# Patient Record
Sex: FEMALE | Race: WHITE | Employment: OTHER | RURAL
[De-identification: names, ages, dates, MRNs, and addresses within clinical notes are randomized per-mention and may not be internally consistent; named-entity substitution may affect disease eponyms.]

---

## 2017-01-06 ENCOUNTER — OFFICE VISIT (OUTPATIENT)
Dept: INTERNAL MEDICINE CLINIC | Age: 77
End: 2017-01-06

## 2017-01-06 VITALS
OXYGEN SATURATION: 97 % | HEART RATE: 58 BPM | HEIGHT: 63 IN | WEIGHT: 227 LBS | TEMPERATURE: 96.2 F | DIASTOLIC BLOOD PRESSURE: 70 MMHG | SYSTOLIC BLOOD PRESSURE: 170 MMHG | RESPIRATION RATE: 20 BRPM | BODY MASS INDEX: 40.22 KG/M2

## 2017-01-06 DIAGNOSIS — N39.0 URINARY TRACT INFECTION, SITE UNSPECIFIED: Primary | ICD-10-CM

## 2017-01-06 DIAGNOSIS — R30.9 PAIN WITH URINATION: ICD-10-CM

## 2017-01-06 LAB
BILIRUB UR QL STRIP: NEGATIVE
GLUCOSE UR-MCNC: NEGATIVE MG/DL
KETONES P FAST UR STRIP-MCNC: NEGATIVE MG/DL
PH UR STRIP: 6 [PH] (ref 4.6–8)
PROT UR QL STRIP: NEGATIVE MG/DL
SP GR UR STRIP: 1.01 (ref 1–1.03)
UA UROBILINOGEN AMB POC: NORMAL (ref 0.2–1)
URINALYSIS CLARITY POC: NORMAL
URINALYSIS COLOR POC: YELLOW
URINE BLOOD POC: NORMAL
URINE LEUKOCYTES POC: NORMAL
URINE NITRITES POC: NEGATIVE

## 2017-01-06 RX ORDER — NITROFURANTOIN 25; 75 MG/1; MG/1
100 CAPSULE ORAL 2 TIMES DAILY
Qty: 14 CAP | Refills: 0 | Status: SHIPPED | OUTPATIENT
Start: 2017-01-06 | End: 2017-01-13

## 2017-01-06 RX ORDER — PHENAZOPYRIDINE HYDROCHLORIDE 100 MG/1
100 TABLET, FILM COATED ORAL
Qty: 9 TAB | Refills: 0 | Status: SHIPPED | OUTPATIENT
Start: 2017-01-06 | End: 2017-01-09

## 2017-01-06 NOTE — PROGRESS NOTES
HISTORY OF PRESENT ILLNESS  Feliciano Schwab is a 78 y.o. female. Dysuria   The history is provided by the patient. This is a new problem. Episode onset: 3 days. The problem occurs hourly. The problem has been gradually worsening. Pertinent negatives include no abdominal pain. Treatments tried: cranberry. The treatment provided mild relief. Urinary Frequency    Associated symptoms include frequency. Pertinent negatives include no abdominal pain. Started Contrave x few weeks but not lost much weight yet. Review of Systems   Gastrointestinal: Negative for abdominal pain. Genitourinary: Positive for difficulty urinating and frequency. Physical Exam  Visit Vitals    /70 (BP 1 Location: Left arm, BP Patient Position: Sitting)    Pulse (!) 58    Temp 96.2 °F (35.7 °C) (Oral)    Resp 20    Ht 5' 3\" (1.6 m)    Wt 227 lb (103 kg)    SpO2 97%    BMI 40.21 kg/m2     WD WN female NAD  Heart RRR without murmers clicks or rubs  Lungs CTA  Abdo soft nontender  Ext no edema    ASSESSMENT and PLAN  Encounter Diagnoses   Name Primary?  Urinary tract infection, site unspecified Yes    Pain with urination      Orders Placed This Encounter    AMB POC URINALYSIS DIP STICK AUTO W/O MICRO    nitrofurantoin, macrocrystal-monohydrate, (MACROBID) 100 mg capsule    phenazopyridine (PYRIDIUM) 100 mg tablet     Follow-up Disposition:  Return if symptoms worsen or fail to improve.

## 2017-01-06 NOTE — MR AVS SNAPSHOT
Visit Information Date & Time Provider Department Dept. Phone Encounter #  
 1/6/2017  2:00 PM MD Negro Salinas 380 [phone] [id number] Follow-up Instructions Return if symptoms worsen or fail to improve. Your Appointments 1/6/2017  2:00 PM  
ACUTE CARE with Claudine Ayoub MD  
[facility]) Appt Note: uti $0cp 1/6/17 [facility]  
 [address] [place] [address]  
[phone]  
  
   
 [address] [facility]  
  
    
 1/25/2017  8:15 AM  
ROUTINE CARE with MD Negro Salinas 380 ([facility]) Appt Note: 3 month f/u $0 cp [facility] 10/25/16  
 [address] [place] [address]  
[phone]  
  
   
 [address] [place] [address] [address] Upcoming Health Maintenance Date Due DTaP/Tdap/Td series (1 - Tdap) 12/18/1963 GLAUCOMA SCREENING Q2Y 12/18/2007 COLONOSCOPY 7/27/2015 MEDICARE YEARLY EXAM 9/14/2017 BREAST CANCER SCRN MAMMOGRAM 10/6/2018 Allergies as of 1/6/2017  Review Complete On: 1/6/2017 By: Claudine Ayoub MD  
  
 Severity Noted Reaction Type Reactions Lisinopril  06/08/2015    Cough Current Immunizations  Reviewed on 9/13/2016 Name Date Influenza Vaccine Justice Me) 10/23/2014 Influenza Vaccine (Quad) PF 9/13/2016, 11/13/2015 Pneumococcal Conjugate (PCV-13) 12/31/2015 Pneumococcal Polysaccharide (PPSV-23) 9/13/2016 Not reviewed this visit You Were Diagnosed With   
  
 Codes Comments Urinary tract infection, site unspecified    -  Primary ICD-10-CM: N39.0 Pain with urination     ICD-10-CM: R30.9 ICD-9-CM: 329. 1 Vitals BP Pulse Temp Resp Height(growth percentile) Weight(growth percentile) 170/70 (BP 1 Location: Left arm, BP Patient Position: Sitting) (!) 58 96.2 °F (35.7 °C) (Oral) 20 5' 3\" (1.6 m) 227 lb (103 kg) SpO2 BMI OB Status Smoking Status 97% 40.21 kg/m2 Ablation Never Smoker BMI and BSA Data Body Mass Index Body Surface Area  
 40.21 kg/m 2 2.14 m 2 Preferred Pharmacy Pharmacy Name Phone [facility] [address] [facility], [address] [facility] [phone] Your Updated Medication List  
  
   
This list is accurate as of: 1/6/17 12:03 PM.  Always use your most recent med list. amLODIPine 2.5 mg tablet Commonly known as:  Voncile Overbrook Take 1 Tab by mouth daily. aspirin 81 mg chewable tablet Take 1 Tab by mouth daily. COQ10  100-100 mg-unit Cap Generic drug:  coenzyme q10-vitamin e Take  by mouth.  
  
 levothyroxine 112 mcg tablet Commonly known as:  SYNTHROID Take 1 Tab by mouth Daily (before breakfast). meloxicam 15 mg tablet Commonly known as:  MOBIC Take 1 Tab by mouth daily. multivitamin tablet Commonly known as:  ONE A DAY Take 1 Tab by mouth daily. naltrexone-buPROPion 8-90 mg Tber ER tablet Commonly known as:  Yeyo Braun Week 1 1 tab PO QAM, Week 2 1QAM 1QHS, Week 3 2QAM 1 QHS, Week 4 & beyond 2QAM 2QHS  
  
 nitrofurantoin (macrocrystal-monohydrate) 100 mg capsule Commonly known as:  MACROBID Take 1 Cap by mouth two (2) times a day for 7 days. Omega-3-DHA-EPA-Fish Oil 1,200 (144-216) mg Cap Take 90 Caps by mouth two (2) times a day. phenazopyridine 100 mg tablet Commonly known as:  PYRIDIUM Take 1 Tab by mouth three (3) times daily as needed for Pain for up to 3 days. Prescriptions Sent to Pharmacy Refills  
 nitrofurantoin, macrocrystal-monohydrate, (MACROBID) 100 mg capsule 0 Sig: Take 1 Cap by mouth two (2) times a day for 7 days. Class: Normal  
 Pharmacy: [id number] [facility], [address] [facility] Ph #: [phone]  Route: Oral  
 phenazopyridine (PYRIDIUM) 100 mg tablet 0  
 Sig: Take 1 Tab by mouth three (3) times daily as needed for Pain for up to 3 days. Class: Normal  
 Pharmacy: [id number], [address] Ph #: [phone] Route: Oral  
  
We Performed the Following AMB POC URINALYSIS DIP STICK AUTO W/O MICRO [48207 CPT(R)] Follow-up Instructions Return if symptoms worsen or fail to improve. Introducing [facility] & HEALTH SERVICES! Oscar Warren introduces [employer] patient portal. Now you can access parts of your medical record, email your doctor's office, and request medication refills online. 1. In your internet browser, go to [email or web address] 2. Click on the First Time User? Click Here link in the Sign In box. You will see the New Member Sign Up page. 3. Enter your [employer] Access Code exactly as it appears below. You will not need to use this code after youve completed the sign-up process. If you do not sign up before the expiration date, you must request a new code. · [employer] Access Code: [email or web address] Expires: 4/6/2017 12:03 PM 
 
4. Enter the last four digits of your Social Security Number (xxxx) and Date of Birth (mm/dd/yyyy) as indicated and click Submit. You will be taken to the next sign-up page. 5. Create a [employer] ID. This will be your [employer] login ID and cannot be changed, so think of one that is secure and easy to remember. 6. Create a [employer] password. You can change your password at any time. 7. Enter your Password Reset Question and Answer. This can be used at a later time if you forget your password. 8. Enter your e-mail address. You will receive e-mail notification when new information is available in [address] 9. Click Sign Up. You can now view and download portions of your medical record. 10. Click the Download Summary menu link to download a portable copy of your medical information.  
 
If you have questions, please visit the Frequently Asked Questions section of the [employer]. Remember, [employer] is NOT to be used for urgent needs. For medical emergencies, dial 911. Now available from your iPhone and Android! Please provide this summary of care documentation to your next provider. Your primary care clinician is listed as Chantell Dalton. If you have any questions after today's visit, please call [phone].

## 2017-02-09 ENCOUNTER — TELEPHONE (OUTPATIENT)
Dept: INTERNAL MEDICINE CLINIC | Age: 77
End: 2017-02-09

## 2017-02-09 DIAGNOSIS — N30.00 ACUTE CYSTITIS WITHOUT HEMATURIA: Primary | ICD-10-CM

## 2017-02-09 RX ORDER — CIPROFLOXACIN 500 MG/1
500 TABLET ORAL 2 TIMES DAILY
Qty: 14 TAB | Refills: 0 | Status: SHIPPED | OUTPATIENT
Start: 2017-02-09 | End: 2017-02-16

## 2017-02-09 NOTE — PROGRESS NOTES
Advised nurse to tell patient new ABT sent into pharmacy. Come in to give urine 2-3 days after finishing ABT.

## 2017-02-09 NOTE — TELEPHONE ENCOUNTER
Pt called back, stated that she really needed that antibiotic,called in asap, stated that she needed a call back/mrdication called in/pt was unsure of what type of antibiotic she needed

## 2017-02-09 NOTE — TELEPHONE ENCOUNTER
Spoke with pt stated that she still believes she has an uti stated that she has used all of her antibiotics would like dr Naomi Decker to call her in more antibiotics

## 2017-03-02 ENCOUNTER — OFFICE VISIT (OUTPATIENT)
Dept: INTERNAL MEDICINE CLINIC | Age: 77
End: 2017-03-02

## 2017-03-02 VITALS
BODY MASS INDEX: 38.27 KG/M2 | DIASTOLIC BLOOD PRESSURE: 59 MMHG | OXYGEN SATURATION: 95 % | SYSTOLIC BLOOD PRESSURE: 138 MMHG | RESPIRATION RATE: 16 BRPM | HEART RATE: 58 BPM | TEMPERATURE: 96.8 F | WEIGHT: 216 LBS | HEIGHT: 63 IN

## 2017-03-02 DIAGNOSIS — E03.9 HYPOTHYROIDISM, UNSPECIFIED TYPE: ICD-10-CM

## 2017-03-02 DIAGNOSIS — R00.2 HEART PALPITATIONS: ICD-10-CM

## 2017-03-02 DIAGNOSIS — R00.1 BRADYCARDIA ON ECG: ICD-10-CM

## 2017-03-02 DIAGNOSIS — I10 ESSENTIAL HYPERTENSION: Primary | ICD-10-CM

## 2017-03-02 DIAGNOSIS — R53.83 FATIGUE, UNSPECIFIED TYPE: ICD-10-CM

## 2017-03-02 NOTE — PATIENT INSTRUCTIONS
To see if the medications is causing a side affect, consider a drug holiday. Stop the medications for several weeks. If the side affect goes away you it's likely the medication. Can re-start the medication, If symptoms re-occur than it's the medication that is causing the problem. Can stop contrave to see if Sx goes away.

## 2017-03-02 NOTE — MR AVS SNAPSHOT
Visit Information Date & Time Provider Department Dept. Phone Encounter #  
 3/2/2017  8:50 AM Ellie Arriaga  Damon Sparks [id number] Follow-up Instructions Return in about 3 months (around 6/2/2017) for routine follow up. Upcoming Health Maintenance Date Due DTaP/Tdap/Td series (1 - Tdap) 12/18/1963 GLAUCOMA SCREENING Q2Y 12/18/2007 COLONOSCOPY 7/27/2015 MEDICARE YEARLY EXAM 9/14/2017 Allergies as of 3/2/2017  Review Complete On: 3/2/2017 By: Serena Singh LPN Severity Noted Reaction Type Reactions Lisinopril  06/08/2015    Cough Current Immunizations  Reviewed on 9/13/2016 Name Date Influenza Vaccine [facility]) 10/23/2014 Influenza Vaccine (Quad) PF 9/13/2016, 11/13/2015 Pneumococcal Conjugate (PCV-13) 12/31/2015 Pneumococcal Polysaccharide (PPSV-23) 9/13/2016 Not reviewed this visit You Were Diagnosed With   
  
 Codes Comments Essential hypertension    -  Primary ICD-10-CM: I10 
ICD-9-CM: 401.9 Hypothyroidism, unspecified type     ICD-10-CM: E03.9 ICD-9-CM: 244.9 Heart palpitations     ICD-10-CM: R00.2 ICD-9-CM: 785.1 Bradycardia on ECG     ICD-10-CM: R00.1 ICD-9-CM: 427.89 Fatigue, unspecified type     ICD-10-CM: R53.83 ICD-9-CM: 780.79 Vitals BP  
  
  
  
  
  
 138/59 (BP 1 Location: Left arm, BP Patient Position: Sitting) Vitals History BMI and BSA Data Body Mass Index Body Surface Area  
 38.26 kg/m 2 2.09 m 2 Preferred Pharmacy Pharmacy Name Phone [facility] [address] [facility] [address] [facility] [phone] Your Updated Medication List  
  
   
This list is accurate as of: 3/2/17 10:29 AM.  Always use your most recent med list. amLODIPine 2.5 mg tablet Commonly known as:  Cr [place]olla Take 1 Tab by mouth daily. aspirin 81 mg chewable tablet Take 1 Tab by mouth daily. COQ10  100-100 mg-unit Cap Generic drug:  coenzyme q10-vitamin e Take  by mouth.  
  
 levothyroxine 112 mcg tablet Commonly known as:  SYNTHROID Take 1 Tab by mouth Daily (before breakfast). meloxicam 15 mg tablet Commonly known as:  MOBIC Take 1 Tab by mouth daily. multivitamin tablet Commonly known as:  ONE A DAY Take 1 Tab by mouth daily. naltrexone-buPROPion 8-90 mg Tber ER tablet Commonly known as:  [place] Take 2 Tabs by mouth ACB/HS. Omega-3-DHA-EPA-Fish Oil 1,200 (144-216) mg Cap Take 90 Caps by mouth two (2) times a day. Prescriptions Sent to Pharmacy Refills  
 naltrexone-buPROPion (CONTRAVE) 8-90 mg TbER ER tablet 1 Sig: Take 2 Tabs by mouth ACB/HS. Class: Normal  
 Pharmacy: [facility] [address] [facility], [address] [facility] Ph #: [phone] Route: Oral  
  
We Performed the Following AMB POC EKG ROUTINE W/ 12 LEADS, INTER & REP [85483 CPT(R)] CBC W/O DIFF [96401 CPT(R)] METABOLIC PANEL, BASIC [79854 CPT(R)] MN COLLECTION VENOUS BLOOD,VENIPUNCTURE [id number] CPT(R)] MN HANDLG&/OR CONVEY OF SPEC FOR TR OFFICE TO LAB [14483 CPT(R)] REFERRAL TO CARDIOLOGY [QLL86 Custom] TSH 3RD GENERATION [47719 CPT(R)] Follow-up Instructions Return in about 3 months (around 6/2/2017) for routine follow up. Referral Information Referral ID Referred By Referred To  
  
 [id number] Martinez WHEATLEY MD   
   [address] Phone: [phone] Fax: [phone] Visits Status Start Date End Date 1 New Request 3/2/17 3/2/18 If your referral has a status of pending review or denied, additional information will be sent to support the outcome of this decision. Patient Instructions To see if the medications is causing a side affect, consider a drug holiday. Stop the medications for several weeks. If the side affect goes away you it's likely the medication. Can re-start the medication, If symptoms re-occur than it's the medication that is causing the problem. Can stop contrave to see if Sx goes away. Introducing [facility] & HE[facility] SERVICES! [employer] introduces [employer] patient portal. Now you can access parts of your medical record, email your doctor's office, and request medication refills online. 1. In your internet browser, go to [email or web address] 2. Click on the First Time User? Click Here link in the Sign In box. You will see the New Member Sign Up page. 3. Enter your [employer] Access Code exactly as it appears below. You will not need to use this code after youve completed the sign-up process. If you do not sign up before the expiration date, you must request a new code. · [employer] Access Code: [email or web address] Expires: 4/6/2017 12:03 PM 
 
4. Enter the last four digits of your Social Security Number (xxxx) and Date of Birth (mm/dd/yyyy) as indicated and click Submit. You will be taken to the next sign-up page. 5. Create a [employer] ID. This will be your [employer] login ID and cannot be changed, so think of one that is secure and easy to remember. 6. Create a [employer] password. You can change your password at any time. 7. Enter your Password Reset Question and Answer. This can be used at a later time if you forget your password. 8. Enter your e-mail address. You will receive e-mail notification when new information is available in [address] 9. Click Sign Up. You can now view and download portions of your medical record. 10. Click the Download Summary menu link to download a portable copy of your medical information. If you have questions, please visit the Frequently Asked Questions section of the [employer] website. Remember, [employer] is NOT to be used for urgent needs. For medical emergencies, dial 911. Now available from your iPhone and Android! Please provide this summary of care documentation to your next provider. Your primary care clinician is listed as Yazan Mahoney. If you have any questions after today's visit, please call [phone].

## 2017-03-02 NOTE — PROGRESS NOTES
Subjective:     Claudine Dorado is a 78 y.o. female who presents for follow up of hypertension. New concerns: some tremors and pit in stomach anxiety x 1 month ? From contrave. With the weight loss medicine has managed to lose 11 pounds. Had somewhat similar symptoms with the generic version of this medicine. Tired a lot. Some anxiety and feels as if her heart is beating fast.  No exertional chest pain. The medicine is expensive but it has helped. Does not take any medicines to lower her heart rate. She is somewhat bradycardic. She has no known heart disease. Current Outpatient Prescriptions   Medication Sig Dispense Refill    amLODIPine (NORVASC) 2.5 mg tablet Take 1 Tab by mouth daily. 90 Tab 1    meloxicam (MOBIC) 15 mg tablet Take 1 Tab by mouth daily. 90 Tab 1    naltrexone-buPROPion (CONTRAVE) 8-90 mg TbER ER tablet Take 2 Tabs by mouth ACB/HS. Appointment needed to refill this medication again. 120 Tab 1    levothyroxine (SYNTHROID) 112 mcg tablet Take 1 Tab by mouth Daily (before breakfast). 90 Tab 1    aspirin 81 mg chewable tablet Take 1 Tab by mouth daily. 90 Tab 3    multivitamin (ONE A DAY) tablet Take 1 Tab by mouth daily.  coenzyme q10-vitamin e (COQ10 ) 100-100 mg-unit cap Take  by mouth.  Omega-3-DHA-EPA-Fish Oil 1,200 (144-216) mg cap Take 90 Caps by mouth two (2) times a day. 90 Cap 0     Allergies   Allergen Reactions    Lisinopril Cough       Diet and Lifestyle: generally follows a low fat low cholesterol diet, sedentary    Cardiovascular ROS: taking medications as instructed, side effects noted by patient include palpitations, no TIA's, no chest pain on exertion, no dyspnea on exertion, no swelling of ankles. Review of Systems, additional:  Pertinent items are noted in HPI.       Patient Active Problem List    Diagnosis Date Noted    Hypothyroidism 09/10/2014    HTN (hypertension) 09/10/2014    Abnormal heart sounds 09/05/2014    Generalized OA 09/05/2014     Past Medical History:   Diagnosis Date    Arthritis     GERD (gastroesophageal reflux disease)     Hypercholesterolemia     Hypertension     Hypothyroid 2004     Social History   Substance Use Topics    Smoking status: Never Smoker    Smokeless tobacco: Never Used    Alcohol use Yes        Lab Results  Component Value Date/Time   WBC 4.7 03/02/2017 10:03 AM   HGB 15.4 03/02/2017 10:03 AM   HCT 45.3 03/02/2017 10:03 AM   PLATELET 410 89/14/0011 10:03 AM   MCV 93 03/02/2017 10:03 AM       Lab Results  Component Value Date/Time   Cholesterol, total 224 09/13/2016 12:12 PM   HDL Cholesterol 51 09/13/2016 12:12 PM   LDL, calculated 120 09/13/2016 12:12 PM   Triglyceride 264 09/13/2016 12:12 PM       Lab Results  Component Value Date/Time   ALT (SGPT) 52 09/13/2016 12:12 PM   AST (SGOT) 43 09/13/2016 12:12 PM   Alk. phosphatase 74 09/13/2016 12:12 PM   Bilirubin, total 0.5 09/13/2016 12:12 PM       Lab Results  Component Value Date/Time   GFR est AA 92 03/02/2017 10:03 AM   GFR est non-AA 80 03/02/2017 10:03 AM   Creatinine 0.74 03/02/2017 10:03 AM   BUN 12 03/02/2017 10:03 AM   Sodium 141 03/02/2017 10:03 AM   Potassium 4.4 03/02/2017 10:03 AM   Chloride 101 03/02/2017 10:03 AM   CO2 24 03/02/2017 10:03 AM      Lab Results  Component Value Date/Time   TSH 2.450 03/02/2017 10:03 AM   T3 Uptake 32 07/17/2014 11:33 AM   T4, Total 10.0 07/17/2014 11:33 AM      Lab Results   Component Value Date/Time    Glucose 83 03/02/2017 10:03 AM         Her EKG shows sinus bradycardia, nonspecific T-wave abnormalities. No acute disease.     Objective:     Physical exam significant for the following: No acute disease    Weight has decreased 11 pounds from prior visit    Visit Vitals    /59 (BP 1 Location: Left arm, BP Patient Position: Sitting)    Pulse (!) 58    Temp 96.8 °F (36 °C) (Oral)    Resp 16    Ht 5' 3\" (1.6 m)    Wt 216 lb (98 kg)    SpO2 95%    BMI 38.26 kg/m2     Appearance: alert, well appearing, and in no distress. General exam: CVS exam BP noted to be well controlled today in office, S1, S2 normal, no gallop, no murmur, chest clear, no JVD, no HSM, no edema, neurological exam alert, oriented, normal speech, no focal findings or movement disorder noted. .   Assessment/Plan:     hypertension stable. ICD-10-CM ICD-9-CM    1. Essential hypertension K81 958.5 METABOLIC PANEL, BASIC      WY HANDLG&/OR CONVEY OF SPEC FOR TR OFFICE TO LAB      WY COLLECTION VENOUS BLOOD,VENIPUNCTURE   2. Hypothyroidism, unspecified type E03.9 244.9 TSH 3RD GENERATION      WY HANDLG&/OR CONVEY OF SPEC FOR TR OFFICE TO LAB      WY COLLECTION VENOUS BLOOD,VENIPUNCTURE   3. Heart palpitations R00.2 785.1 AMB POC EKG ROUTINE W/ 12 LEADS, INTER & REP      WY HANDLG&/OR CONVEY OF SPEC FOR TR OFFICE TO LAB      WY COLLECTION VENOUS BLOOD,VENIPUNCTURE   4. Bradycardia on ECG R00.1 427.89 REFERRAL TO CARDIOLOGY   5. Fatigue, unspecified type R53.83 780.79 CBC W/O DIFF     Her symptoms might be from her weight loss medicine. She could certainly do a drug holiday to see if symptoms improve off the medicine. Sinus bradycardia of unclear etiology complaints of palpitations fatigue. Feel she should see cardiology for further workup. Referral done. Follow-up Disposition:  Return in about 3 months (around 6/2/2017) for routine follow up.

## 2017-03-02 NOTE — LETTER
3/6/2017 8:55 AM 
 
[phone] Dear Lena Lundberg: 
 
Please find your most recent results below. Resulted Orders METABOLIC PANEL, BASIC Result Value Ref Range Glucose 83 65 - 99 mg/dL BUN 12 8 - 27 mg/dL Creatinine 0.74 0.57 - 1.00 mg/dL GFR est non-AA 80 >59 mL/min/1.73 GFR est AA 92 >59 mL/min/1.73  
 BUN/Creatinine ratio 16 11 - 26 Sodium 141 134 - 144 mmol/L Potassium 4.4 3.5 - 5.2 mmol/L Chloride 101 96 - 106 mmol/L  
 CO2 24 18 - 29 mmol/L Calcium 9.4 8.7 - 10.3 mg/dL Narrative Performed at:  [address]  [id number] : Tiffanie Schwarz MD, Phone:  [phone] TSH 3RD GENERATION Result Value Ref Range TSH 2.450 0.450 - 4.500 uIU/mL Narrative Performed at:  [address]  [id number] : Tiffanie Schwarz MD, Phone:  [phone] CBC W/O DIFF Result Value Ref Range WBC 4.7 3.4 - 10.8 x10E3/uL  
 RBC 4.88 3.77 - 5.28 x10E6/uL HGB 15.4 11.1 - 15.9 g/dL HCT 45.3 34.0 - 46.6 % MCV 93 79 - 97 fL  
 MCH 31.6 26.6 - 33.0 pg  
 MCHC 34.0 31.5 - 35.7 g/dL  
 RDW 13.8 12.3 - 15.4 % PLATELET 900 270 - 998 x10E3/uL Narrative Performed at:  [address]  [id number] : Tiffanie Schwarz MD, Phone:  [phone] RECOMMENDATIONS: 
Results of labs are normal/ stable. Follow up as scheduled. Please call me if you have any questions: [phone] Sincerely, Jackie Ayoub MD

## 2017-03-02 NOTE — PROGRESS NOTES
Chief Complaint   Patient presents with    Hypertension     I have reviewed the patient's medical history in detail and updated the computerized patient record. Health Maintenance reviewed. 1. Have you been to the ER, urgent care clinic since your last visit? Hospitalized since your last visit?no    2. Have you seen or consulted any other health care providers outside of the [address] [place] [address] since your last visit? Include any pap smears or colon screening.  no

## 2017-03-03 LAB
BUN SERPL-MCNC: 12 MG/DL (ref 8–27)
BUN/CREAT SERPL: 16 (ref 11–26)
CALCIUM SERPL-MCNC: 9.4 MG/DL (ref 8.7–10.3)
CHLORIDE SERPL-SCNC: 101 MMOL/L (ref 96–106)
CO2 SERPL-SCNC: 24 MMOL/L (ref 18–29)
CREAT SERPL-MCNC: 0.74 MG/DL (ref 0.57–1)
ERYTHROCYTE [DISTWIDTH] IN BLOOD BY AUTOMATED COUNT: 13.8 % (ref 12.3–15.4)
GLUCOSE SERPL-MCNC: 83 MG/DL (ref 65–99)
HCT VFR BLD AUTO: 45.3 % (ref 34–46.6)
HGB BLD-MCNC: 15.4 G/DL (ref 11.1–15.9)
MCH RBC QN AUTO: 31.6 PG (ref 26.6–33)
MCHC RBC AUTO-ENTMCNC: 34 G/DL (ref 31.5–35.7)
MCV RBC AUTO: 93 FL (ref 79–97)
PLATELET # BLD AUTO: 236 X10E3/UL (ref 150–379)
POTASSIUM SERPL-SCNC: 4.4 MMOL/L (ref 3.5–5.2)
RBC # BLD AUTO: 4.88 X10E6/UL (ref 3.77–5.28)
SODIUM SERPL-SCNC: 141 MMOL/L (ref 134–144)
TSH SERPL DL<=0.005 MIU/L-ACNC: 2.45 UIU/ML (ref 0.45–4.5)
WBC # BLD AUTO: 4.7 X10E3/UL (ref 3.4–10.8)

## 2017-03-15 ENCOUNTER — CLINICAL SUPPORT (OUTPATIENT)
Dept: CARDIOLOGY CLINIC | Age: 77
End: 2017-03-15

## 2017-03-15 ENCOUNTER — OFFICE VISIT (OUTPATIENT)
Dept: CARDIOLOGY CLINIC | Age: 77
End: 2017-03-15

## 2017-03-15 VITALS
BODY MASS INDEX: 39.78 KG/M2 | HEART RATE: 57 BPM | SYSTOLIC BLOOD PRESSURE: 140 MMHG | DIASTOLIC BLOOD PRESSURE: 72 MMHG | RESPIRATION RATE: 18 BRPM | WEIGHT: 224.5 LBS | OXYGEN SATURATION: 97 % | HEIGHT: 63 IN

## 2017-03-15 DIAGNOSIS — E03.8 OTHER SPECIFIED HYPOTHYROIDISM: ICD-10-CM

## 2017-03-15 DIAGNOSIS — E66.01 MORBID OBESITY DUE TO EXCESS CALORIES (HCC): ICD-10-CM

## 2017-03-15 DIAGNOSIS — R06.83 SNORING: ICD-10-CM

## 2017-03-15 DIAGNOSIS — R00.2 HEART PALPITATIONS: ICD-10-CM

## 2017-03-15 DIAGNOSIS — G47.30 SLEEP-DISORDERED BREATHING: ICD-10-CM

## 2017-03-15 DIAGNOSIS — R00.1 BRADYCARDIA: ICD-10-CM

## 2017-03-15 DIAGNOSIS — R00.2 HEART PALPITATIONS: Primary | ICD-10-CM

## 2017-03-15 DIAGNOSIS — I10 ESSENTIAL HYPERTENSION: ICD-10-CM

## 2017-03-15 NOTE — PROGRESS NOTES
[address], [address] [facility]  [phone]     Subjective:      Feliciano Schwab is a 78 y.o. female is here for new patient visit for heart palpitations, sinus bradycardia, and fatigue. Palpitations occurring most days and occur in strings. She also notes snoring and daytime hypersomnolence. She is short of breath with moderate exertion which she attributes to her weight and deconditioning. The patient denies chest pain/ orthopnea, PND, LE edema, syncope, or presyncope. Patient Active Problem List    Diagnosis Date Noted    Hypothyroidism 09/10/2014    HTN (hypertension) 09/10/2014    Abnormal heart sounds 09/05/2014    Generalized OA 09/05/2014      Abigail Enciso MD  Past Medical History:   Diagnosis Date    Arthritis     GERD (gastroesophageal reflux disease)     Hypercholesterolemia     Hypertension     Hypothyroid 2004      Past Surgical History:   Procedure Laterality Date    HX APPENDECTOMY  1960    HX TUBAL LIGATION  1969     Allergies   Allergen Reactions    Lisinopril Cough      Family History   Problem Relation Age of Onset    Heart Disease Father 79    Hypertension Father     Stroke Father     Psychiatric Disorder Sister     Cancer Brother     Psychiatric Disorder Brother       Social History     Social History    Marital status:      Spouse name: N/A    Number of children: N/A    Years of education: N/A     Occupational History    Not on file. Social History Main Topics    Smoking status: Never Smoker    Smokeless tobacco: Never Used    Alcohol use Yes      Comment: 2-3 glasses of wine/daily    Drug use: No    Sexual activity: Yes     Partners: Male     Birth control/ protection: None     Other Topics Concern    Not on file     Social History Narrative    sailing      Current Outpatient Prescriptions   Medication Sig    naltrexone-buPROPion (CONTRAVE) 8-90 mg TbER ER tablet Take 2 Tabs by mouth ACB/HS.     amLODIPine (NORVASC) 2.5 mg tablet Take 1 Tab by mouth daily.  meloxicam (MOBIC) 15 mg tablet Take 1 Tab by mouth daily.  levothyroxine (SYNTHROID) 112 mcg tablet Take 1 Tab by mouth Daily (before breakfast).  aspirin 81 mg chewable tablet Take 1 Tab by mouth daily.  multivitamin (ONE A DAY) tablet Take 1 Tab by mouth daily.  Omega-3-DHA-EPA-Fish Oil 1,200 (144-216) mg cap Take 90 Caps by mouth two (2) times a day.  coenzyme q10-vitamin e (COQ10 ) 100-100 mg-unit cap Take  by mouth. No current facility-administered medications for this visit. Review of Symptoms:  11 systems reviewed, negative other than as stated in the HPI    Physical ExamPhysical Exam:    Vitals:    03/15/17 1422 03/15/17 1432   BP: 130/72 140/72   Pulse: (!) 57    Resp: 18    SpO2: 97%    Weight: 224 lb 8 oz (101.8 kg)    Height: 5' 3\" (1.6 m)      Body mass index is 39.77 kg/(m^2). General PE   Gen:  NAD  Mental Status - Alert. General Appearance - Not in acute distress. Chest and Lung Exam   Inspection: Accessory muscles - No use of accessory muscles in breathing. Auscultation:   Breath sounds: - Normal.   Cardiovascular   Inspection: Jugular vein - Bilateral - Inspection Normal.   Palpation/Percussion:   Apical Impulse: - Normal.   Auscultation: Rhythm - Regular. Heart Sounds - S1 WNL and S2 WNL. No S3 or S4. Murmurs & Other Heart Sounds: Auscultation of the heart reveals - No Murmurs. Peripheral Vascular   Upper Extremity: Inspection - Bilateral - No Cyanotic nailbeds or Digital clubbing. Lower Extremity:   Palpation: Edema - Bilateral - No edema. Abdomen:   Soft, non-tender, bowel sounds are active.   Neuro: A&O times 3, CN and motor grossly WNL    Labs:   Lab Results   Component Value Date/Time    Cholesterol, total 224 09/13/2016 12:12 PM    Cholesterol, total 173 07/17/2014 11:33 AM    HDL Cholesterol 51 09/13/2016 12:12 PM    HDL Cholesterol 49 07/17/2014 11:33 AM    LDL, calculated 120 09/13/2016 12:12 PM    LDL, calculated 84 07/17/2014 11:33 AM    Triglyceride 264 09/13/2016 12:12 PM    Triglyceride 202 07/17/2014 11:33 AM     No results found for: CPK, CPKX, CPX  Lab Results   Component Value Date/Time    Sodium 141 03/02/2017 10:03 AM    Potassium 4.4 03/02/2017 10:03 AM    Chloride 101 03/02/2017 10:03 AM    CO2 24 03/02/2017 10:03 AM    Glucose 83 03/02/2017 10:03 AM    BUN 12 03/02/2017 10:03 AM    Creatinine 0.74 03/02/2017 10:03 AM    BUN/Creatinine ratio 16 03/02/2017 10:03 AM    GFR est AA 92 03/02/2017 10:03 AM    GFR est non-AA 80 03/02/2017 10:03 AM    Calcium 9.4 03/02/2017 10:03 AM    Bilirubin, total 0.5 09/13/2016 12:12 PM    AST (SGOT) 43 09/13/2016 12:12 PM    Alk. phosphatase 74 09/13/2016 12:12 PM    Protein, total 6.5 09/13/2016 12:12 PM    Albumin 4.1 09/13/2016 12:12 PM    A-G Ratio 1.7 09/13/2016 12:12 PM    ALT (SGPT) 52 09/13/2016 12:12 PM       EKG:  NSR      Assessment:        1. Heart palpitations    2. Bradycardia    3. Other specified hypothyroidism    4. Essential hypertension    5. Morbid obesity due to excess calories ([address]    6. Snoring    7.  Sleep-disordered breathing        Orders Placed This Encounter    REFERRAL TO SLEEP STUDIES     Referral Priority:   Routine     Referral Type:   Consultation     Referral Reason:   Specialty Services Required    AMB POC EKG ROUTINE W/ 12 LEADS, INTER & REP     Order Specific Question:   Reason for Exam:     Answer:   Routine    CARDIAC HOLTER MONITOR, 24 HOURS     Standing Status:   Future     Standing Expiration Date:   9/15/2017     Order Specific Question:   Reason for Exam:     Answer:   palpitations    EKG TREADMILL STRESS TEST     Standing Status:   Future     Standing Expiration Date:   9/14/2017     Order Specific Question:   Reason for Exam:     Answer:   SOB    2D ECHO COMPLETE ADULT (TTE) W OR WO CONTR     Standing Status:   Future     Standing Expiration Date:   9/12/2017     Order Specific Question:   Reason for Exam:     Answer:   HF        Plan:     78year-old with multiple cardiac risk factors and near daily palpitations in strings, dyspnea on exertion, snoring and daytime hypersomnolence. I will evaluate for dysrhythmia, structural heart disease and ischemia, and refer to sleep medicine to evaluate for sleep apnea. If testing is normal, she may just have shortness of breath and palpitations due to cardiac deconditioning, in which case she will gradually increase exercise and improved diet, and follow-up in 1 year she will follow-up sooner as needed if abnormal test results.     Debi Evans MD

## 2017-03-15 NOTE — MR AVS SNAPSHOT
Visit Information Date & Time Provider Department Dept. Phone Encounter #  
 3/15/2017  2:15 PM Nilson Denson, [address] [facility] [phone] [id number] Follow-up Instructions Routing History Follow-up and Disposition History Your Appointments 3/16/2017  3:30 PM  
ECHO CARDIOGRAMS 2D with ECHO, [facility] Marjan Butts) Appt Note: Per Dr Marcelino Azevedo $0CP REM 2D ECHO COMPLETE ADULT (TTE) W OR WO CONTR [35787 CPT(R)] CARDIAC HOLTER MONITOR, 24 HOURS [85705.66141 CPT(R)] EKG TREADMILL STRESS TEST [79433 CPT(R)]  
 [address] [facility]  
[phone] [address] [facility]  
  
    
 3/17/2017  1:30 PM  
STRESS TEST with [address] [facility] [address] [facility] Marjan Butts) Appt Note: Per Dr Marcelino Azeevdo $0CP REM 2D ECHO COMPLETE ADULT (TTE) W OR WO CONTR [15330 CPT(R)] CARDIAC HOLTER MONITOR, 24 HOURS [41980.51593 CPT(R)] EKG TREADMILL STRESS TEST [79315 CPT(R)]  
 [address] [facility]  
[phone] [address] [facility] Upcoming Health Maintenance Date Due DTaP/Tdap/Td series (1 - Tdap) 12/18/1963 GLAUCOMA SCREENING Q2Y 12/18/2007 COLONOSCOPY 7/27/2015 MEDICARE YEARLY EXAM 9/14/2017 Allergies as of 3/15/2017  Review Complete On: 3/15/2017 By: Nilson Denson MD  
  
 Severity Noted Reaction Type Reactions Lisinopril  06/08/2015    Cough Current Immunizations  Reviewed on 9/13/2016 Name Date Influenza Vaccine Melford Going) 10/23/2014 Influenza Vaccine (Quad) PF 9/13/2016, 11/13/2015 Pneumococcal Conjugate (PCV-13) 12/31/2015 Pneumococcal Polysaccharide (PPSV-23) 9/13/2016 Not reviewed this visit You Were Diagnosed With   
  
 Codes Comments Heart palpitations    -  Primary ICD-10-CM: R00.2 ICD-9-CM: 785.1 Bradycardia     ICD-10-CM: R00.1 ICD-9-CM: 427.89   
 Other specified hypothyroidism     ICD-10-CM: E03.8 ICD-9-CM: 244.8 Essential hypertension     ICD-10-CM: I10 
ICD-9-CM: 401.9 Morbid obesity due to excess calories (HCC)     ICD-10-CM: E66.01 
ICD-9-CM: 278.01 Snoring     ICD-10-CM: R06.83 
ICD-9-CM: 786.09 Sleep-disordered breathing     ICD-10-CM: G47.30 ICD-9-CM: 780.59 Vitals BP Pulse Resp Height(growth percentile) Weight(growth percentile) SpO2  
 140/72 (BP 1 Location: Right arm, BP Patient Position: Sitting) (!) 57 18 5' 3\" (1.6 m) 224 lb 8 oz (101.8 kg) 97% BMI OB Status Smoking Status 39.77 kg/m2 Ablation Never Smoker Vitals History BMI and BSA Data Body Mass Index Body Surface Area  
 39.77 kg/m 2 2.13 m 2 Preferred Pharmacy Pharmacy Name Phone [facility] [address] [place] [address] & [facility] [phone] Your Updated Medication List  
  
   
This list is accurate as of: 3/15/17  3:35 PM.  Always use your most recent med list. amLODIPine 2.5 mg tablet Commonly known as:  Lennis Eagles Take 1 Tab by mouth daily. aspirin 81 mg chewable tablet Take 1 Tab by mouth daily. COQ10  100-100 mg-unit Cap Generic drug:  coenzyme q10-vitamin e Take  by mouth.  
  
 levothyroxine 112 mcg tablet Commonly known as:  SYNTHROID Take 1 Tab by mouth Daily (before breakfast). meloxicam 15 mg tablet Commonly known as:  MOBIC Take 1 Tab by mouth daily. multivitamin tablet Commonly known as:  ONE A DAY Take 1 Tab by mouth daily. naltrexone-buPROPion 8-90 mg Tber ER tablet Commonly known as:  Namrata Meléndez Take 2 Tabs by mouth ACB/HS. Omega-3-DHA-EPA-Fish Oil 1,200 (144-216) mg Cap Take 90 Caps by mouth two (2) times a day. We Performed the Following AMB POC EKG ROUTINE W/ 12 LEADS, INTER & REP [68895 CPT(R)] REFERRAL TO SLEEP STUDIES [REF99 Custom] Comments: Please evaluate patient for GREGORY. To-Do List   
 03/15/2017 ECHO:  2D ECHO COMPLETE ADULT (TTE) W OR WO CONTR   
  
 03/15/2017 ECG:  CARDIAC HOLTER MONITOR, 24 HOURS   
  
 03/15/2017 ECG:  EKG TREADMILL STRESS TEST Referral Information Referral ID Referred By Referred To  
  
 [id number] Manjit TYSON Not Available Visits Status Start Date End Date 1 New Request 3/15/17 3/15/18 If your referral has a status of pending review or denied, additional information will be sent to support the outcome of this decision. Introducing [facility]! Kelsey Wu introduces [employer] patient portal. Now you can access parts of your medical record, email your doctor's office, and request medication refills online. 1. In your internet browser, go to [email or web address] 2. Click on the First Time User? Click Here link in the Sign In box. You will see the New Member Sign Up page. 3. Enter your Sm[employer]Fly Access Code exactly as it appears below. You will not need to use this code after youve completed the sign-up process. If you do not sign up before the expiration date, you must request a new code. · [employer] Access Code: [email or web address] Expires: 4/6/2017  1:03 PM 
 
4. Enter the last four digits of your Social Security Number (xxxx) and Date of Birth (mm/dd/yyyy) as indicated and click Submit. You will be taken to the next sign-up page. 5. Create a SmashFly ID. This will be your SmashFly login ID and cannot be changed, so think of one that is secure and easy to remember. 6. Create a SmashFly password. You can change your password at any time. 7. Enter your Password Reset Question and Answer. This can be used at a later time if you forget your password. 8. Enter your e-mail address. You will receive e-mail notification when new information is available in [address] 9. Click Sign Up. You can now view and download portions of your medical record. 10. Click the Download Summary menu link to download a portable copy of your medical information. If you have questions, please visit the Frequently Asked Questions section of the [employer] website. Remember, [employer] is NOT to be used for urgent needs. For medical emergencies, dial 911. Now available from your iPhone and Android! Please provide this summary of care documentation to your next provider. Your primary care clinician is listed as Helen Becker. If you have any questions after today's visit, please call [phone].

## 2017-03-15 NOTE — PROGRESS NOTES
Chief Complaint   Patient presents with    New Patient    Slow Heart Rate     onset 1-1/2 wks ago    Dizziness     on and off x 6 mos; pt denies dizziness now    Shortness of Breath     on exertion

## 2017-03-16 ENCOUNTER — CLINICAL SUPPORT (OUTPATIENT)
Dept: CARDIOLOGY CLINIC | Age: 77
End: 2017-03-16

## 2017-03-16 DIAGNOSIS — E03.8 OTHER SPECIFIED HYPOTHYROIDISM: ICD-10-CM

## 2017-03-16 DIAGNOSIS — I10 ESSENTIAL HYPERTENSION: ICD-10-CM

## 2017-03-16 DIAGNOSIS — R00.2 HEART PALPITATIONS: ICD-10-CM

## 2017-03-16 DIAGNOSIS — R00.1 BRADYCARDIA: ICD-10-CM

## 2017-03-16 DIAGNOSIS — E66.01 MORBID OBESITY DUE TO EXCESS CALORIES (HCC): ICD-10-CM

## 2017-03-20 DIAGNOSIS — I48.0 PAROXYSMAL ATRIAL FIBRILLATION (HCC): Primary | ICD-10-CM

## 2017-03-20 RX ORDER — METOPROLOL TARTRATE 25 MG/1
25 TABLET, FILM COATED ORAL 2 TIMES DAILY
Qty: 180 TAB | Refills: 1 | Status: SHIPPED | OUTPATIENT
Start: 2017-03-20 | End: 2017-08-04 | Stop reason: SDUPTHER

## 2017-03-21 ENCOUNTER — CLINICAL SUPPORT (OUTPATIENT)
Dept: CARDIOLOGY CLINIC | Age: 77
End: 2017-03-21

## 2017-03-21 DIAGNOSIS — I10 ESSENTIAL HYPERTENSION: ICD-10-CM

## 2017-03-21 DIAGNOSIS — R06.02 SOB (SHORTNESS OF BREATH): Primary | ICD-10-CM

## 2017-03-21 DIAGNOSIS — R00.1 BRADYCARDIA: ICD-10-CM

## 2017-03-21 DIAGNOSIS — R00.2 HEART PALPITATIONS: ICD-10-CM

## 2017-03-21 DIAGNOSIS — E66.01 MORBID OBESITY DUE TO EXCESS CALORIES (HCC): ICD-10-CM

## 2017-03-21 DIAGNOSIS — E03.8 OTHER SPECIFIED HYPOTHYROIDISM: ICD-10-CM

## 2017-03-21 NOTE — PROGRESS NOTES
Ana let the patient know she had several short runs of an irregular heartbeat, atrial fibrillation. She will benefit from a blood thinner as well as a medicine to try to help prevent it. For now continue aspirin, add low-dose metoprolol (electronically prescribed) and schedule follow-up in the near future with myself or Jef Hernadez.

## 2017-03-23 ENCOUNTER — TELEPHONE (OUTPATIENT)
Dept: CARDIOLOGY CLINIC | Age: 77
End: 2017-03-23

## 2017-03-23 NOTE — TELEPHONE ENCOUNTER
----- Message from [place] Molina MD sent at 3/22/2017 10:17 PM EDT -----  Echo with mild abnormalities follow-up for abnormal Holter monitor as discussed in previous message.

## 2017-03-23 NOTE — TELEPHONE ENCOUNTER
----- Message from Kody Rudolph MD sent at 3/20/2017  8:34 PM EDT -----  Neelam Born let the patient know she had several short runs of an irregular heartbeat, atrial fibrillation. She will benefit from a blood thinner as well as a medicine to try to help prevent it. For now continue aspirin, add low-dose metoprolol (electronically prescribed) and schedule follow-up in the near future with myself or Naveed Guillen.

## 2017-03-23 NOTE — TELEPHONE ENCOUNTER
Verified patient with two identifiers. Pt informed of echo results. She has a follow up on March 29 to discuss all test results. Pt verbalized understanding.

## 2017-03-23 NOTE — PROGRESS NOTES
Echo with mild abnormalities follow-up for abnormal Holter monitor as discussed in previous message.

## 2017-03-23 NOTE — TELEPHONE ENCOUNTER
Verified patient with two identifiers. Pt informed of monitor results. Advised to continue the aspirin and start metoprolol 25 mg bid. Explained that script has been e-scribed to her pharmacy. She will  tomorrow and start. Appt made for March 29 to discuss results with Dr Maribell Michel. Pt verbalized understanding.

## 2017-03-29 ENCOUNTER — TELEPHONE (OUTPATIENT)
Dept: CARDIOLOGY CLINIC | Age: 77
End: 2017-03-29

## 2017-03-29 ENCOUNTER — OFFICE VISIT (OUTPATIENT)
Dept: CARDIOLOGY CLINIC | Age: 77
End: 2017-03-29

## 2017-03-29 VITALS
WEIGHT: 222.5 LBS | RESPIRATION RATE: 16 BRPM | HEIGHT: 63 IN | SYSTOLIC BLOOD PRESSURE: 122 MMHG | OXYGEN SATURATION: 98 % | BODY MASS INDEX: 39.42 KG/M2 | HEART RATE: 47 BPM | DIASTOLIC BLOOD PRESSURE: 72 MMHG

## 2017-03-29 DIAGNOSIS — E66.01 MORBID OBESITY DUE TO EXCESS CALORIES (HCC): ICD-10-CM

## 2017-03-29 DIAGNOSIS — E03.8 OTHER SPECIFIED HYPOTHYROIDISM: ICD-10-CM

## 2017-03-29 DIAGNOSIS — I48.0 PAROXYSMAL ATRIAL FIBRILLATION (HCC): Primary | ICD-10-CM

## 2017-03-29 DIAGNOSIS — I10 ESSENTIAL HYPERTENSION: ICD-10-CM

## 2017-03-29 NOTE — TELEPHONE ENCOUNTER
Called and emphasized the patient to be cautious in her use of Mobic. Advised if she needs to use it more than occasionally, let us know and we would likely need to add a PPI to reduce her risk of gastric bleeding.

## 2017-03-29 NOTE — MR AVS SNAPSHOT
Visit Information Date & Time Provider Department Dept. Phone Encounter #  
 3/29/2017  1:30 PM Ova Kayser, [address] [place] [address] [facility] [phone] Your Appointments 4/24/2017 10:40 AM  
New Patient with Marcella Segura MD  
[address] [place] ([facility]) Appt Note: NP, refd by Dr. Rah Ndiaye, waking up with rapid heart rate [address] [facility], [address] [phone] [address] [place] [address], [address] [address]  
  
    
 10/5/2017 10:45 AM  
6 MONTH with Ova Kayser, MD  
[facility] Appt Note: Per Dr Jaimee Edwards $0CP REM  
 [address] [facility]  
[phone] [address] [facility] Upcoming Health Maintenance Date Due DTaP/Tdap/Td series (1 - Tdap) 12/18/1963 GLAUCOMA SCREENING Q2Y 12/18/2007 COLONOSCOPY 7/27/2015 MEDICARE YEARLY EXAM 9/14/2017 Allergies as of 3/29/2017  Review Complete On: 3/29/2017 By: Ova Kayser, MD  
  
 Severity Noted Reaction Type Reactions Lisinopril  06/08/2015    Cough Current Immunizations  Reviewed on 9/13/2016 Name Date Influenza Vaccine Seretha Cave) 10/23/2014 Influenza Vaccine (Quad) PF 9/13/2016, 11/13/2015 Pneumococcal Conjugate (PCV-13) 12/31/2015 Pneumococcal Polysaccharide (PPSV-23) 9/13/2016 Not reviewed this visit You Were Diagnosed With   
  
 Codes Comments Paroxysmal atrial fibrillation (HCC)    -  Primary ICD-10-CM: I48.0 ICD-9-CM: 427.31 Essential hypertension     ICD-10-CM: I10 
ICD-9-CM: 401.9 Other specified hypothyroidism     ICD-10-CM: E03.8 ICD-9-CM: 244.8 Vitals  BP Pulse Resp Height(growth percentile) Weight(growth percentile) SpO2  
 122/72 (BP 1 Location: Right arm, BP Patient Position: Sitting) (!) 47 16 5' 3\" (1.6 m) 222 lb 8 oz (100.9 kg) 98% BMI OB Status Smoking Status 39.41 kg/m2 Ablation Never Smoker Vitals History BMI and BSA Data Body Mass Index Body Surface Area  
 39.41 kg/m 2 2.12 m 2 Preferred Pharmacy Pharmacy Name Phone [facility] [address] [facility], [address] [phone] Your Updated Medication List  
  
   
This list is accurate as of: 3/29/17  2:11 PM.  Always use your most recent med list. amLODIPine 2.5 mg tablet Commonly known as:  [place] Citron Take 1 Tab by mouth daily. apixaban 5 mg tablet Commonly known as:  Adrien Candido Take 1 Tab by mouth two (2) times a day. COQ10  100-100 mg-unit Cap Generic drug:  coenzyme q10-vitamin e Take  by mouth.  
  
 levothyroxine 112 mcg tablet Commonly known as:  SYNTHROID Take 1 Tab by mouth Daily (before breakfast). meloxicam 15 mg tablet Commonly known as:  MOBIC Take 1 Tab by mouth daily. metoprolol tartrate 25 mg tablet Commonly known as:  LOPRESSOR Take 1 Tab by mouth two (2) times a day. To help prevent atrial fibrillation MILK THISTLE PO Take  by mouth.  
  
 multivitamin tablet Commonly known as:  ONE A DAY Take 1 Tab by mouth daily. naltrexone-buPROPion 8-90 mg Tber ER tablet Commonly known as:  Noralyn Puja Take 2 Tabs by mouth ACB/HS. Omega-3-DHA-EPA-Fish Oil 1,200 (144-216) mg Cap Take 90 Caps by mouth two (2) times a day. Prescriptions Sent to Pharmacy Refills  
 apixaban (ELIQUIS) 5 mg tablet 6 Sig: Take 1 Tab by mouth two (2) times a day. Class: Normal  
 Pharmacy: [address] [facility], [address] [facility] Ph #: [phone] Route: Oral  
  
We Performed the Following AMB POC EKG ROUTINE W/ 12 LEADS, INTER & REP [03374 CPT(R)] Introducing [facility] SERVICES! Desirae Turner introduces [employer] patient portal. Now you can access parts of your medical record, email your doctor's office, and request medication refills online. 1. In your internet browser, go to [email or web address] 2. Click on the First Time User? Click Here link in the Sign In box. You will see the New Member Sign Up page. 3. Enter your [employer] Access Code exactly as it appears below. You will not need to use this code after youve completed the sign-up process. If you do not sign up before the expiration date, you must request a new code. · [employer] Access Code: [email or web address] Expires: 4/6/2017  1:03 PM 
 
4. Enter the last four digits of your Social Security Number (xxxx) and Date of Birth (mm/dd/yyyy) as indicated and click Submit. You will be taken to the next sign-up page. 5. Create a [employer] ID. This will be your [employer] login ID and cannot be changed, so think of one that is secure and easy to remember. 6. Create a [employer] password. You can change your password at any time. 7. Enter your Password Reset Question and Answer. This can be used at a later time if you forget your password. 8. Enter your e-mail address. You will receive e-mail notification when new information is available in [address] 9. Click Sign Up. You can now view and download portions of your medical record. 10. Click the Download Summary menu link to download a portable copy of your medical information. If you have questions, please visit the Frequently Asked Questions section of the [employer] website. Remember, [employer] is NOT to be used for urgent needs. For medical emergencies, dial 911. Now available from your iPhone and Android! Please provide this summary of care documentation to your next provider. Your primary care clinician is listed as Kristen Sandhoff. If you have any questions after today's visit, please call [phone].

## 2017-03-29 NOTE — PROGRESS NOTES
Subjective/HPI:     Kenney Drake is a 78 y.o. female is here for f/u appt after cardiac testing for c/o palpitations, BRICEÑO and fatigue. She reports that she hasnt had palpitations like before, didn't have any while wearing the monitor. She has unchanged BRICEÑO and fatigue. She started the Metoprolol and is taking ASA. The patient denies chest pain, orthopnea, PND, LE edema, syncope, or presyncope. Patient Active Problem List   Diagnosis Code    Abnormal heart sounds R01.2    Generalized OA M15.9    Hypothyroidism E03.9    HTN (hypertension) I10    Paroxysmal atrial fibrillation (HCC) I48.0       PCP Provider  Monique Nugent MD  Past Medical History:   Diagnosis Date    Arthritis     GERD (gastroesophageal reflux disease)     Hypercholesterolemia     Hypertension     Hypothyroid 2004      Past Surgical History:   Procedure Laterality Date    HX APPENDECTOMY  1960    HX TUBAL LIGATION  1969     Allergies   Allergen Reactions    Lisinopril Cough      Family History   Problem Relation Age of Onset    Heart Disease Father 79    Hypertension Father     Stroke Father     Psychiatric Disorder Sister     Cancer Brother     Psychiatric Disorder Brother       Current Outpatient Prescriptions   Medication Sig    MILK THISTLE PO Take  by mouth.  apixaban (ELIQUIS) 5 mg tablet Take 1 Tab by mouth two (2) times a day.  metoprolol tartrate (LOPRESSOR) 25 mg tablet Take 1 Tab by mouth two (2) times a day. To help prevent atrial fibrillation    naltrexone-buPROPion (CONTRAVE) 8-90 mg TbER ER tablet Take 2 Tabs by mouth ACB/HS.  amLODIPine (NORVASC) 2.5 mg tablet Take 1 Tab by mouth daily.  meloxicam (MOBIC) 15 mg tablet Take 1 Tab by mouth daily.  levothyroxine (SYNTHROID) 112 mcg tablet Take 1 Tab by mouth Daily (before breakfast).  multivitamin (ONE A DAY) tablet Take 1 Tab by mouth daily.     Omega-3-DHA-EPA-Fish Oil 1,200 (144-216) mg cap Take 90 Caps by mouth two (2) times a day.  coenzyme q10-vitamin e (COQ10 ) 100-100 mg-unit cap Take  by mouth. No current facility-administered medications for this visit. Vitals:    03/29/17 1303 03/29/17 1319   BP: 130/72 122/72   Pulse: (!) 47    Resp: 16    SpO2: 98%    Weight: 222 lb 8 oz (100.9 kg)    Height: 5' 3\" (1.6 m)      Social History     Social History    Marital status:      Spouse name: N/A    Number of children: N/A    Years of education: N/A     Occupational History    Not on file. Social History Main Topics    Smoking status: Never Smoker    Smokeless tobacco: Never Used    Alcohol use Yes      Comment: 2-3 glasses of wine/daily    Drug use: No    Sexual activity: Yes     Partners: Male     Birth control/ protection: None     Other Topics Concern    Not on file     Social History Narrative    sailing       I have reviewed the nurses notes, vitals, problem list, allergy list, medical history, family, social history and medications. Review of Symptoms:    General: Pt denies excessive weight gain or loss. Pt is able to conduct ADL's.+fatigue  HEENT: Denies blurred vision, headaches, epistaxis and difficulty swallowing. Respiratory: Denies shortness of breath, +BRICEÑO, denies wheezing or stridor. Cardiovascular: Denies precordial pain, palpitations, edema or PND  Gastrointestinal: Denies poor appetite, indigestion, abdominal pain or blood in stool  Urinary: Denies dysuria, pyuria  Musculoskeletal: Denies pain or swelling from muscles or joints  Neurologic: Denies tremor, paresthesias, or sensory motor disturbance  Skin: Denies rash, itching or texture change. Psych: Denies depression        Physical Exam:      General: Well developed, in no acute distress, cooperative and alert  HEENT: No carotid bruits, no JVD, trach is midline. Neck Supple, PEERL, EOM intact. Heart:  Normal S1/S2 negative S3 or S4.  Regular, no murmur, gallop or rub.   Respiratory: Clear bilaterally x 4, no wheezing or rales  Abdomen:   Soft, non-tender, no masses, bowel sounds are active.   Extremities:  No edema, normal cap refill, no cyanosis, atraumatic. Neuro: A&Ox3, speech clear, gait stable. Skin: Skin color is normal. No rashes or lesions. Non diaphoretic  Vascular: 2+ pulses symmetric in all extremities    Cardiographics    Echo-Left ventricle: Systolic function was at the lower limits of normal.  Ejection fraction was estimated in the range of 50 % to 55 %. There were  no regional wall motion abnormalities. Wall thickness was at the upper  limits of normal.    Left atrium: The atrium was moderately dilated. Tricuspid valve: Insufficient tricuspid regurgitation to estimate  pulmonary artery pressure. Summary Measurements  CW measurements:  Aortic Valve:   AV MaxPG was 8.9 mmHg.  AV Vmax was 1.5 m/s.  AV meanPG  was 4.6 mmHg.    ekg treadmill stress test- neg    Results for orders placed or performed in visit on 03/15/17   CARDIAC HOLTER MONITOR, 24 HOURS    Narrative    ECG Monitor/24 hours, Complete    Reason for Holter Monitor   PALPITATIONS    Heartbeat    Slowest 47  Average 63  Fastest  104    Results:   Underlying Rhythm: Normal sinus rhythm      Atrial Arrhythmias: premature atrial contractions; occasional and 7 short runs of paroxysmal atrial fibrillation            AV Conduction: normal    Ventricular Arrhythmias: premature ventricular contractions; rare     ST Segment Analysis:non-specific changes     Symptom Correlation:  Symptoms may correspond to premature beats. Short runs of atrial fibrillation appear to have been asymptomatic.     Emeka Miller MD             Cardiology Labs:  Lab Results   Component Value Date/Time    Cholesterol, total 224 09/13/2016 12:12 PM    HDL Cholesterol 51 09/13/2016 12:12 PM    LDL, calculated 120 09/13/2016 12:12 PM    Triglyceride 264 09/13/2016 12:12 PM       Lab Results   Component Value Date/Time    Sodium 141 03/02/2017 10:03 AM    Potassium 4.4 03/02/2017 10:03 AM    Chloride 101 03/02/2017 10:03 AM    CO2 24 03/02/2017 10:03 AM    Glucose 83 03/02/2017 10:03 AM    BUN 12 03/02/2017 10:03 AM    Creatinine 0.74 03/02/2017 10:03 AM    BUN/Creatinine ratio 16 03/02/2017 10:03 AM    GFR est AA 92 03/02/2017 10:03 AM    GFR est non-AA 80 03/02/2017 10:03 AM    Calcium 9.4 03/02/2017 10:03 AM    AST (SGOT) 43 09/13/2016 12:12 PM    Alk. phosphatase 74 09/13/2016 12:12 PM    Protein, total 6.5 09/13/2016 12:12 PM    Albumin 4.1 09/13/2016 12:12 PM    A-G Ratio 1.7 09/13/2016 12:12 PM    ALT (SGPT) 52 09/13/2016 12:12 PM           Assessment:     Assessment:     Sadie Raygoza was seen today for results. Diagnoses and all orders for this visit:    Paroxysmal atrial fibrillation (HCC)  -     AMB POC EKG ROUTINE W/ 12 LEADS, INTER & REP  -     apixaban (ELIQUIS) 5 mg tablet; Take 1 Tab by mouth two (2) times a day. Essential hypertension    Other specified hypothyroidism    Morbid obesity due to excess calories ([facility]        ICD-10-CM ICD-9-CM    1. Paroxysmal atrial fibrillation (HCC) I48.0 427.31 AMB POC EKG ROUTINE W/ 12 LEADS, INTER & REP      apixaban (ELIQUIS) 5 mg tablet   2. Essential hypertension I10 401.9    3. Other specified hypothyroidism E03.8 244.8    4. Morbid obesity due to excess calories (HCC) E66.01 278.01      Orders Placed This Encounter    AMB POC EKG ROUTINE W/ 12 LEADS, INTER & REP     Order Specific Question:   Reason for Exam:     Answer:   routine    MILK THISTLE PO     Sig: Take  by mouth.  apixaban (ELIQUIS) 5 mg tablet     Sig: Take 1 Tab by mouth two (2) times a day. Dispense:  60 Tab     Refill:  6        Plan:     PAF:  Patient presents today after cardiac testing for c/o BRICEÑO, fatigue and palpitations. She has had improvement in her palpitations. She continues with BRICEÑO and fatigue. Her echo showed reduced EF 50-55%. Stress test neg. Holter showed short bursts of afib.  I have started her on Metoprolol and she has noted significant improvement in her palpitations. She is a CHADS vasc score of 3. I will start her on Eliquis 5mg po bid instead of aspirin. Reduce use of Mobic, use Tylenol instead if able. Discussed risks and benefits of anticoagulation, signs and symptoms of bleeding, and to call if any concerns. She has sleep study consult in April. Discussed importance of good BP control. Obesity:  Counseled on diet and exercise- eventual goal of 30-60 minutes 5-7 times a week as per AHA guidelines. Goal of 10% (22 lbs) weight loss for 6 months. F/u in 6 months.     Sejal Barros MD

## 2017-03-30 ENCOUNTER — TELEPHONE (OUTPATIENT)
Dept: CARDIOLOGY CLINIC | Age: 77
End: 2017-03-30

## 2017-04-06 ENCOUNTER — TELEPHONE (OUTPATIENT)
Dept: CARDIOLOGY CLINIC | Age: 77
End: 2017-04-06

## 2017-04-06 NOTE — TELEPHONE ENCOUNTER
Called left message on voicemail to return call. Need to confirm patient taking Eliquis 5 mg, cost is $93.00 for 90 days.

## 2017-04-12 ENCOUNTER — TELEPHONE (OUTPATIENT)
Dept: CARDIOLOGY CLINIC | Age: 77
End: 2017-04-12

## 2017-04-24 ENCOUNTER — OFFICE VISIT (OUTPATIENT)
Dept: SLEEP MEDICINE | Age: 77
End: 2017-04-24

## 2017-04-24 VITALS
WEIGHT: 223 LBS | DIASTOLIC BLOOD PRESSURE: 83 MMHG | TEMPERATURE: 96.8 F | OXYGEN SATURATION: 97 % | HEIGHT: 63 IN | BODY MASS INDEX: 39.51 KG/M2 | SYSTOLIC BLOOD PRESSURE: 133 MMHG | HEART RATE: 45 BPM

## 2017-04-24 DIAGNOSIS — E66.9 OBESITY (BMI 30-39.9): ICD-10-CM

## 2017-04-24 DIAGNOSIS — G47.33 OSA (OBSTRUCTIVE SLEEP APNEA): Primary | ICD-10-CM

## 2017-04-24 RX ORDER — ASPIRIN 81 MG/1
TABLET ORAL DAILY
COMMUNITY
End: 2018-01-17 | Stop reason: ALTCHOICE

## 2017-04-24 NOTE — PROGRESS NOTES
[address] [facility] [place]. [place], [address]  Tel.  [phone]  Fax. [address] [facility] [address]  [place], [address] [facility]  Tel.  [phone]  Fax. [phone] [address]   Tel.  [phone]  Fax. [phone]         Subjective:      Cletis Bosworth is an 78 y.o. female referred for evaluation for a sleep disorder. She complains of awakening in the middle of the night because of palpitations associated with feels sleepy during the day. Symptoms began several months ago, unchanged since that time. She usually can fall asleep in 5 minutes. Family or house members note snoring, periods of not breathing. She denies completely or partially paralyzed while falling asleep or waking up. Cletis Bosworth does wake up frequently at night. She is bothered by waking up too early and left unable to get back to sleep. She actually sleeps about 6 hours at night and wakes up about 1 times during the night. She does not work shifts: Jessica Wallace indicates she does get too little sleep at night. Her bedtime is 2300. She awakens at 0600. She does take naps. She takes 5 naps a week lasting 30 to 45. She has the following observed behaviors: Loud snoring, Pauses in breathing, Kicking with legs;  . Other remarks: vivid dreams    [place] Sleepiness Score: 8   which reflect moderate daytime drowsiness. Allergies   Allergen Reactions    Lisinopril Cough         Current Outpatient Prescriptions:     aspirin delayed-release 81 mg tablet, Take  by mouth daily. , Disp: , Rfl:     MILK THISTLE PO, Take  by mouth., Disp: , Rfl:     apixaban (ELIQUIS) 5 mg tablet, Take 1 Tab by mouth two (2) times a day., Disp: 60 Tab, Rfl: 6    metoprolol tartrate (LOPRESSOR) 25 mg tablet, Take 1 Tab by mouth two (2) times a day. To help prevent atrial fibrillation, Disp: 180 Tab, Rfl: 1    naltrexone-buPROPion (CONTRAVE) 8-90 mg TbER ER tablet, Take 2 Tabs by mouth ACB/HS. , Disp: 120 Tab, Rfl: 1    amLODIPine (NORVASC) 2.5 mg tablet, Take 1 Tab by mouth daily. , Disp: 90 Tab, Rfl: 1    meloxicam (MOBIC) 15 mg tablet, Take 1 Tab by mouth daily. , Disp: 90 Tab, Rfl: 1    levothyroxine (SYNTHROID) 112 mcg tablet, Take 1 Tab by mouth Daily (before breakfast). , Disp: 90 Tab, Rfl: 1    multivitamin (ONE A DAY) tablet, Take 1 Tab by mouth daily. , Disp: , Rfl:     Omega-3-DHA-EPA-Fish Oil 1,200 (144-216) mg cap, Take 90 Caps by mouth two (2) times a day., Disp: 90 Cap, Rfl: 0    coenzyme q10-vitamin e (COQ10 ) 100-100 mg-unit cap, Take  by mouth., Disp: , Rfl:      She  has a past medical history of Arthritis; GERD (gastroesophageal reflux disease); Hypercholesterolemia; Hypertension; and Hypothyroid (2004). She  has a past surgical history that includes appendectomy (1960) and tubal ligation (1969). She family history includes Cancer in her brother; Heart Disease (age of onset: 79) in her father; Hypertension in her father; Psychiatric Disorder in her brother and sister; Stroke in her father. She  reports that she has never smoked. She has never used smokeless tobacco. She reports that she drinks alcohol. She reports that she does not use illicit drugs. Review of Systems:  Constitutional:  No significant weight loss or weight gain. Eyes:  No blurred vision. CVS:  No significant chest pain  Pulm:  No significant shortness of breath  GI:  No significant nausea or vomiting  :  No significant nocturia  Musculoskeletal:  No significant joint pain at night  Skin:  No significant rashes  Neuro:  No significant dizziness   Psych:  No active mood issues    Sleep Review of Systems: notable for no difficulty falling asleep; frequent awakenings at night;  irregular dreaming noted; no nightmares ; no early morning headaches; no memory problems; no concentration issues; no history of any automobile or occupational accidents due to daytime drowsiness.       Objective:     Visit Vitals    BP 133/83    Pulse (!) 45    Temp 96.8 °F (36 °C)    Ht 5' 3\" (1.6 m)    Wt 223 lb (101.2 kg)    SpO2 97%    BMI 39.5 kg/m2         General:   Not in acute distress   Eyes:  Anicteric sclerae, no obvious strabismus   Nose:  No obvious nasal septum deviation    Oropharynx:   Class 3 oropharyngeal outlet, thick tongue base, low-lying soft palate, narrow tonsilo-pharyngeal pilars   Tonsils:   tonsils are unappreciated   Neck:   Neck circ. in \"inches\": 15; midline trachea   Chest/Lungs:  Equal lung expansion, clear on auscultation    CVS:  Normal rate, regular rhythm; no JVD   Skin:  Warm to touch; no obvious rashes   Neuro:  No focal deficits ; no obvious tremor    Psych:  Normal affect,  normal countenance;          Assessment:       ICD-10-CM ICD-9-CM    1. GREGORY (obstructive sleep apnea) G47.33 327.23 SLEEP STUDY UNATTENDED, RESPIRATORY EFFORT    2. Obesity (BMI 30-39. 9) E66.9 278.00          Plan:     * The patient currently has a Moderate Risk for having sleep apnea. STOP-BANG score 5.  * HSAT was ordered for initial evaluation. * She was provided information on sleep apnea including coresponding risk factors and the importance of proper treatment. * Counseling was provided regarding proper sleep hygiene and safe driving. * We'll call her with test results. I have reviewed/discussed the above normal BMI with the patient. I have recommended the following interventions: dietary management education, guidance, and counseling . Josefa Torres Thank you for allowing us to participate in your patient's medical care. We'll keep you updated on these investigations.     Alanna Gaytan M.D.  (electronically signed)  Diplomate in Sleep Medicine, [facility]

## 2017-04-24 NOTE — PATIENT INSTRUCTIONS
217 [facility] [place]. [place], [address]  Tel.  [phone]  Fax. 100 [facility] 60  [place], [address] [facility]  Tel.  [phone]  Fax. [phone] [address]  Tel.  [phone]  Fax. [phone]     Sleep Apnea: After Your Visit  Your Care Instructions  Sleep apnea occurs when you frequently stop breathing for 10 seconds or longer during sleep. It can be mild to severe, based on the number of times per hour that you stop breathing or have slowed breathing. Blocked or narrowed airways in your nose, mouth, or throat can cause sleep apnea. Your airway can become blocked when your throat muscles and tongue relax during sleep. Sleep apnea is common, occurring in 1 out of 20 individuals. Individuals having any of the following characteristics should be evaluated and treated right away due to high risk and detrimental consequences from untreated sleep apnea:  1. Obesity  2. Congestive Heart failure  3. Atrial Fibrillation  4. Uncontrolled Hypertension  5. Type II Diabetes  6. Night-time Arrhythmias  7. Stroke  8. Pulmonary Hypertension  9. High-risk Driving Populations (pilots, truck drivers, etc.)  10. Patients Considering Weight-loss Surgery    How do you know you have sleep apnea? You probably have sleep apnea if you answer 'yes' to 3 or more of the following questions:  S - Have you been told that you Snore? T - Are you often Tired during the day? O - Has anyone Observed you stop breathing while sleeping? P- Do you have (or are being treated for) high blood Pressure? B - Are you obese (Body Mass Index > 35)? A - Is your Age 48years old or older? N - Is your Neck size greater than 16 inches? G - Are you male Gender? A sleep physician can prescribe a breathing device that prevents tissues in the throat from blocking your airway.  Or your doctor may recommend using a dental device (oral breathing device) to help keep your airway open. In some cases, surgery may be needed to remove enlarged tissues in the throat. Follow-up care is a key part of your treatment and safety. Be sure to make and go to all appointments, and call your doctor if you are having problems. It's also a good idea to know your test results and keep a list of the medicines you take. How can you care for yourself at home? · Lose weight, if needed. It may reduce the number of times you stop breathing or have slowed breathing. · Go to bed at the same time every night. · Sleep on your side. It may stop mild apnea. If you tend to roll onto your back, sew a pocket in the back of your pajama top. Put a tennis ball into the pocket, and stitch the pocket shut. This will help keep you from sleeping on your back. · Avoid alcohol and medicines such as sleeping pills and sedatives before bed. · Do not smoke. Smoking can make sleep apnea worse. If you need help quitting, talk to your doctor about stop-smoking programs and medicines. These can increase your chances of quitting for good. · Prop up the head of your bed 4 to 6 inches by putting bricks under the legs of the bed. · Treat breathing problems, such as a stuffy nose, caused by a cold or allergies. · Use a continuous positive airway pressure (CPAP) breathing machine if lifestyle changes do not help your apnea and your doctor recommends it. The machine keeps your airway from closing when you sleep. · If CPAP does not help you, ask your doctor whether you should try other breathing machines. A bilevel positive airway pressure machine has two types of air pressureâone for breathing in and one for breathing out. Another device raises or lowers air pressure as needed while you breathe. · If your nose feels dry or bleeds when using one of these machines, talk with your doctor about increasing moisture in the air. A humidifier may help.   · If your nose is runny or stuffy from using a breathing machine, talk with your doctor about using decongestants or a corticosteroid nasal spray. When should you call for help? Watch closely for changes in your health, and be sure to contact your doctor if:  · You still have sleep apnea even though you have made lifestyle changes. · You are thinking of trying a device such as CPAP. · You are having problems using a CPAP or similar machine. Where can you learn more? Go to Omnigy. Enter H845 in the search box to learn more about \"Sleep Apnea: After Your Visit. \"   © 5132-6943 Healthwise, [employer]. Care instructions adapted under license by Tanis Epley (which disclaims liability or warranty for this information). This care instruction is for use with your licensed healthcare professional. If you have questions about a medical condition or this instruction, always ask your healthcare professional. Ramon Eduardo any warranty or liability for your use of this information. PROPER SLEEP HYGIENE    What to avoid  · Do not have drinks with caffeine, such as coffee or black tea, for 8 hours before bed. · Do not smoke or use other types of tobacco near bedtime. Nicotine is a stimulant and can keep you awake. · Avoid drinking alcohol late in the evening, because it can cause you to wake in the middle of the night. · Do not eat a big meal close to bedtime. If you are hungry, eat a light snack. · Do not drink a lot of water close to bedtime, because the need to urinate may wake you up during the night. · Do not read or watch TV in bed. Use the bed only for sleeping and sexual activity. What to try  · Go to bed at the same time every night, and wake up at the same time every morning. Do not take naps during the day. · Keep your bedroom quiet, dark, and cool. · Get regular exercise, but not within 3 to 4 hours of your bedtime. .  · Sleep on a comfortable pillow and mattress.   · If watching the clock makes you anxious, turn it facing away from you so you cannot see the time. · If you worry when you lie down, start a worry book. Well before bedtime, write down your worries, and then set the book and your concerns aside. · Try meditation or other relaxation techniques before you go to bed. · If you cannot fall asleep, get up and go to another room until you feel sleepy. Do something relaxing. Repeat your bedtime routine before you go to bed again. · Make your house quiet and calm about an hour before bedtime. Turn down the lights, turn off the TV, log off the computer, and turn down the volume on music. This can help you relax after a busy day. Drowsy Driving  The [address] [place] [address] Road Traffic Safety Administration cites drowsiness as a causing factor in more than 301,818 police reported crashes annually, resulting in 76,000 injuries and 1,500 deaths. Other surveys suggest 55% of people polled have driven while drowsy in the past year, 23% had fallen asleep but not crashed, 3% crashed, and 2% had and accident due to drowsy driving. Who is at risk? Young Drivers: One study of drowsy driving accidents states that 55% of the drivers were under 25 years. Of those, 75% were male. Shift Workers and Travelers: People who work overnight or travel across time zones frequently are at higher risk of experiencing Circadian Rhythm Disorders. They are trying to work and function when their body is programed to sleep. Sleep Deprived: Lack of sleep has a serious impact on your ability to pay attention or focus on a task. Consistently getting less than the average of 8 hours your body needs creates partial or cumulative sleep deprivation. Untreated Sleep Disorders: Sleep Apnea, Narcolepsy, R.L.S., and other sleep disorders (untreated) prevent a person from getting enough restful sleep. This leads to excessive daytime sleepiness and increases the risk for drowsy driving accidents by up to 7 times.   Medications / Alcohol: Even over the counter medications can cause drowsiness. Medications that impair a drivers attention should have a warning label. Alcohol naturally makes you sleepy and on its own can cause accidents. Combined with excessive drowsiness its effects are amplified. Signs of Drowsy Driving:   * You don't remember driving the last few miles   * You may drift out of your ana   * You are unable to focus and your thoughts wander   * You may yawn more often than normal   * You have difficulty keeping your eyes open / nodding off   * Missing traffic signs, speeding, or tailgating  Prevention-   Good sleep hygiene, lifestyle and behavioral choices have the most impact on drowsy driving. There is no substitute for sleep and the average person requires 8 hours nightly. If you find yourself driving drowsy, stop and sleep. Consider the sleep hygiene tips provided during your visit as well. Medication Refill Policy: Refills for all medications require 1 week advance notice. Please have your pharmacy fax a refill request. We are unable to fax, or call in \"controled substance\" medications and you will need to pick these prescriptions up from our office. Mira Dx Activation    Thank you for requesting access to Mira [employer]x. Please follow the instructions below to securely access and download your online medical record. Mira Dx allows you to send messages to your doctor, view your test results, renew your prescriptions, schedule appointments, and more. How Do I Sign Up? 1. In your internet browser, go to [email or web address]. 2. Click on the First Time User? Click Here link in the Sign In box. You will see the New Member Sign Up page. 3. Enter your Mira Dx Access Code exactly as it appears below. You will not need to use this code after youve completed the sign-up process. If you do not sign up before the expiration date, you must request a new code.     Mira Dx Access Code: [email or web address]  Expires: 7/23/2017 10:58 AM (This is the date your Glu Mobile access code will )    4. Enter the last four digits of your Social Security Number (xxxx) and Date of Birth (mm/dd/yyyy) as indicated and click Submit. You will be taken to the next sign-up page. 5. Create a Glu Mobile ID. This will be your Glu Mobile login ID and cannot be changed, so think of one that is secure and easy to remember. 6. Create a Glu Mobile password. You can change your password at any time. 7. Enter your Password Reset Question and Answer. This can be used at a later time if you forget your password. 8. Enter your e-mail address. You will receive e-mail notification when new information is available in [address] 9. Click Sign Up. You can now view and download portions of your medical record. 10. Click the Download Summary menu link to download a portable copy of your medical information. Additional Information    If you have questions, please call [phone]. Remember, Glu Mobile is NOT to be used for urgent needs. For medical emergencies, dial 911. Starting a Weight Loss Plan: After Your Visit  Your Care Instructions  If you are thinking about losing weight, it can be hard to know where to start. Your doctor can help you set up a weight loss plan that best meets your needs. You may want to take a class on nutrition or exercise, or join a weight loss support group. If you have questions about how to make changes to your eating or exercise habits, ask your doctor about seeing a registered dietitian or an exercise specialist.  It can be a big challenge to lose weight. But you do not have to make huge changes at once. Make small changes, and stick with them. When those changes become habit, add a few more changes. If you do not think you are ready to make changes right now, try to pick a date in the future. Make an appointment to see your doctor to discuss whether the time is right for you to start a plan. Follow-up care is a key part of your treatment and safety.  Be sure to make and go to all appointments, and call your doctor if you are having problems. It's also a good idea to know your test results and keep a list of the medicines you take. How can you care for yourself at home? · Set realistic goals. Many people expect to lose much more weight than is likely. A weight loss of 5% to 10% of your body weight may be enough to improve your health. · Get family and friends involved to provide support. Talk to them about why you are trying to lose weight, and ask them to help. They can help by participating in exercise and having meals with you, even if they may be eating something different. · Find what works best for you. If you do not have time or do not like to cook, a program that offers meal replacement bars or shakes may be better for you. Or if you like to prepare meals, finding a plan that includes daily menus and recipes may be best.  · Ask your doctor about other health professionals who can help you achieve your weight loss goals. ¨ A dietitian can help you make healthy changes in your diet. ¨ An exercise specialist or  can help you develop a safe and effective exercise program.  ¨ A counselor or psychiatrist can help you cope with issues such as depression, anxiety, or family problems that can make it hard to focus on weight loss. · Consider joining a support group for people who are trying to lose weight. Your doctor can suggest groups in your area. Where can you learn more? Go to Slanissue.be  Enter U357 in the search box to learn more about \"Starting a Weight Loss Plan: After Your Visit. \"   © 8751-5393 Healthwise, Incorporated. Care instructions adapted under license by Toribio Nyhan (which disclaims liability or warranty for this information).  This care instruction is for use with your licensed healthcare professional. If you have questions about a medical condition or this instruction, always ask your healthcare professional. [address] any warranty or liability for your use of this information.   Content Version: 4.8.18267; Last Revised: September 1, 2009

## 2017-04-24 NOTE — PROGRESS NOTES
217 [facility]. [place], [address]  Tel.  [phone]  Fax. 100 [facility] 60  [place], 200 [facility]  Tel.  [phone]  Fax. [phone] 9250 [facility] Cindy Casas   Tel.  [phone]  Fax. [phone]       S>Ta Hamilton is a 78 y.o. female seen today to receive a home sleep testing unit (HST). · Patient was educated on proper hookup and operation of the HST. · Instruction forms and documentation were reviewed and signed. · The patient demonstrated good understanding of the HST. O>    Visit Vitals    /83    Pulse (!) 45    Temp 96.8 °F (36 °C)    Ht 5' 3\" (1.6 m)    Wt 223 lb (101.2 kg)    SpO2 97%    BMI 39.5 kg/m2    Neck circ. in \"inches\": 15    A>  1. GREGORY (obstructive sleep apnea)    2. Obesity (BMI 30-39. 9)          P>  · General information regarding operations and maintenance of the device was provided. · She was provided information on sleep apnea including coresponding risk factors and the importance of proper treatment. · Follow-up appointment was made to return the HST. She will be contacted once the results have been reviewed. · She was asked to contact our office for any problems regarding her home sleep test study.

## 2017-04-25 ENCOUNTER — TELEPHONE (OUTPATIENT)
Dept: SLEEP MEDICINE | Age: 77
End: 2017-04-25

## 2017-04-25 ENCOUNTER — HOSPITAL ENCOUNTER (OUTPATIENT)
Dept: SLEEP MEDICINE | Age: 77
Discharge: HOME | End: 2017-04-25
Payer: MEDICARE

## 2017-04-25 PROCEDURE — 95806 SLEEP STUDY UNATT&RESP EFFT: CPT

## 2017-04-25 NOTE — TELEPHONE ENCOUNTER
HSAT Returned    Date of Study: 4/24/2017    The following information was gathered from the patients study log:    · Lights off: 12 AM  · Estimated sleep onset: 12:15 AM    · Awakened a total of 2 times  · The patient felt they slept 6.5 hours  · Patient took nothing before starting the test  · Sleep quality was same compared to a usual nights sleep. Further information provided: I usually go to bed @   11 PM. Woke up @6 or 7 AM and got up for the restroom twice.

## 2017-04-26 NOTE — TELEPHONE ENCOUNTER
217 [facility] [place]. [place], [address]  Tel.  [phone]  Fax. 100 [facility] 60  [place], [address] [facility]  Tel.  [phone]  Fax. [phone] [address] [place]  Tel.  [phone]  Fax. [phone]   Polysomnogram was performed and the results of the study were explained to the patient. Please refer to interpretation report for further details. Apnea/Hypopnea index of 9 which indicates mild apnea. She continues to have snoring. * Treatment options were offered. She feels she will not be compliant with regular CPAP use. * We have recommended a dedicated weight loss and exercise regiment as significant weight reduction has been shown to reduce severity of obstructive sleep apnea. * Counseling was provided regarding safe driving and proper sleep hygiene, with particular attention to maintaining lateral positioning while sleeping with the use of bed pillows to reduce apnic events at night. Caution with hypnotics, alcohol, and sedating pain medications as these can worsen sleep apnea. * She was asked to contact our office at any time for further questions regarding her sleep symptoms.     Eugenie Mckeon M.D.  (electronically signed)  Diplomate in Sleep Medicine, [employer]

## 2017-05-25 ENCOUNTER — TELEPHONE (OUTPATIENT)
Dept: CARDIOLOGY CLINIC | Age: 77
End: 2017-05-25

## 2017-05-25 NOTE — TELEPHONE ENCOUNTER
Patient called and said that the tylenol is not working. Is there another substitute? Please call, thanks.

## 2017-05-25 NOTE — TELEPHONE ENCOUNTER
Informed patient the concern for bleeding issue with antiinflammatory medications and Eliquis may need to check with PCP or orthopedic Doctor for other medication that will not interfere with the Eliquis .

## 2017-06-26 NOTE — TELEPHONE ENCOUNTER
Requested Prescriptions     Pending Prescriptions Disp Refills    naltrexone-buPROPion (CONTRAVE) 8-90 mg TbER ER tablet 120 Tab 1     Sig: Take 2 Tabs by mouth ACB/HS.  amLODIPine (NORVASC) 2.5 mg tablet 90 Tab 1     Sig: Take 1 Tab by mouth daily.

## 2017-06-26 NOTE — TELEPHONE ENCOUNTER
Last office visit:  3/2/17  Last filled:  Contrave:  3/2/17 #120 x 1 refill                     Norvasc:  2/23/17 #90 X 1 refill  No changes  No follow up scheduled

## 2017-06-27 RX ORDER — AMLODIPINE BESYLATE 2.5 MG/1
2.5 TABLET ORAL DAILY
Qty: 90 TAB | Refills: 1 | Status: SHIPPED | OUTPATIENT
Start: 2017-06-27 | End: 2018-02-03 | Stop reason: SDUPTHER

## 2017-08-04 RX ORDER — METOPROLOL TARTRATE 25 MG/1
TABLET, FILM COATED ORAL
Qty: 180 TAB | Refills: 0 | Status: SHIPPED | OUTPATIENT
Start: 2017-08-04 | End: 2017-10-05 | Stop reason: SDUPTHER

## 2017-09-21 ENCOUNTER — OFFICE VISIT (OUTPATIENT)
Dept: INTERNAL MEDICINE CLINIC | Age: 77
End: 2017-09-21

## 2017-09-21 VITALS
HEIGHT: 63 IN | SYSTOLIC BLOOD PRESSURE: 132 MMHG | RESPIRATION RATE: 16 BRPM | DIASTOLIC BLOOD PRESSURE: 80 MMHG | WEIGHT: 220.8 LBS | BODY MASS INDEX: 39.12 KG/M2 | OXYGEN SATURATION: 97 % | HEART RATE: 53 BPM | TEMPERATURE: 97.1 F

## 2017-09-21 DIAGNOSIS — R42 DIZZINESS: Primary | ICD-10-CM

## 2017-09-21 DIAGNOSIS — Z23 ENCOUNTER FOR IMMUNIZATION: ICD-10-CM

## 2017-09-21 RX ORDER — MECLIZINE HYDROCHLORIDE 25 MG/1
25 TABLET ORAL
Qty: 30 TAB | Refills: 0 | Status: SHIPPED | OUTPATIENT
Start: 2017-09-21 | End: 2017-10-01

## 2017-09-21 NOTE — PATIENT INSTRUCTIONS
Metoprolol (By mouth)   Metoprolol (met-oh-PROE-lol)  Treats high blood pressure, angina (chest pain), and heart failure. May lower the risk of death after a heart attack. This medicine is a beta-blocker. Brand Name(s): Lopressor, Toprol XL   There may be other brand names for this medicine. When This Medicine Should Not Be Used: This medicine is not right for everyone. Do not use if you had an allergic reaction to metoprolol or similar medicines. Do not use this medicine if you have certain blood circulation or heart problems. Ask your doctor about these problems. How to Use This Medicine:   Tablet, Long Acting Tablet  · Take your medicine as directed. Your dose may need to be changed several times to find what works best for you. · Take this medicine with a meal or right after a meal. Take this medicine the same way every day, at the same time. · Swallow the tablet whole with a glass of water. You may break the extended-release tablet in half, but do not chew or crush it. · Missed dose: Take a dose as soon as you remember. If it is almost time for your next dose, wait until then and take a regular dose. Do not take extra medicine to make up for a missed dose. · Store the medicine in a closed container at room temperature, away from heat, moisture, and direct light. Drugs and Foods to Avoid:   Ask your doctor or pharmacist before using any other medicine, including over-the-counter medicines, vitamins, and herbal products. · Some medicines can affect how metoprolol works.  Tell your doctor if you are taking any of the following:   ¨ Digoxin, dipyridamole, hydralazine, hydroxychloroquine, methyldopa, quinidine  ¨ Medicine to treat depression (such as bupropion, clomipramine, desipramine, fluoxetine, fluvoxamine, paroxetine, sertraline), medicine to treat mental illness (such as chlorpromazine, fluphenazine, haloperidol, thioridazine), medicine for heart rhythm problems (such as propafenone), HIV/AIDS medicine (such as ritonavir), medicine to treat a fungus infection (such as terbinafine), a monoamine oxidase inhibitor (MAOI), an ergot medicine for headaches, a calcium channel blocker (such as amlodipine, diltiazem, verapamil), or an alpha blocker (such as clonidine, prazosin, reserpine, guanethidine)  Warnings While Using This Medicine:   · Tell your doctor if you are pregnant or breastfeeding, or if you have blood vessel, heart, or circulation problems (such as heart failure, rhythm problems, or slow heartbeat). Tell your doctor if you have kidney disease, liver disease, diabetes, lung disease (such as asthma), an overactive thyroid, or a history of allergies. · This medicine may cause worse symptoms of heart failure while the dose is being adjusted. · Do not stop using this medicine suddenly. Your doctor will need to slowly decrease your dose before you stop it completely. · Tell any doctor or dentist who treats you that you are using this medicine. You may need to stop using this medicine several days before you have surgery or medical tests. · This medicine could lower your blood pressure too much, especially when you first use it or if you are dehydrated. Stand or sit up slowly if you feel lightheaded or dizzy. · This medicine may make you dizzy or drowsy. Do not drive or do anything else that could be dangerous until you know how this medicine affects you. · Your doctor will check your progress and the effects of this medicine at regular visits. Keep all appointments. · Keep all medicine out of the reach of children. Never share your medicine with anyone.   Possible Side Effects While Using This Medicine:   Call your doctor right away if you notice any of these side effects:  · Allergic reaction: Itching or hives, swelling in your face or hands, swelling or tingling in your mouth or throat, chest tightness, trouble breathing  · Lightheadedness, dizziness, or fainting  · Slow heartbeat  · Swelling in your hands, ankles, or feet, trouble breathing, tiredness  · Worsening chest pain  If you notice these less serious side effects, talk with your doctor:   · Diarrhea  · Mild dizziness or tiredness  If you notice other side effects that you think are caused by this medicine, tell your doctor. Call your doctor for medical advice about side effects. You may report side effects to FDA at 1-510-ITY-3188  © 2017 [address] Information is for End User's use only and may not be sold, redistributed or otherwise used for commercial purposes. The above information is an  only. It is not intended as medical advice for individual conditions or treatments. Talk to your doctor, nurse or pharmacist before following any medical regimen to see if it is safe and effective for you. Dizziness: Care Instructions  Your Care Instructions  Dizziness is the feeling of unsteadiness or fuzziness in your head. It is different than having vertigo, which is a feeling that the room is spinning or that you are moving or falling. It is also different from lightheadedness, which is the feeling that you are about to faint. It can be hard to know what causes dizziness. Some people feel dizzy when they have migraine headaches. Sometimes bouts of flu can make you feel dizzy. Some medical conditions, such as heart problems or high blood pressure, can make you feel dizzy. Many medicines can cause dizziness, including medicines for high blood pressure, pain, or anxiety. If a medicine causes your symptoms, your doctor may recommend that you stop or change the medicine. If it is a problem with your heart, you may need medicine to help your heart work better. If there is no clear reason for your symptoms, your doctor may suggest watching and waiting for a while to see if the dizziness goes away on its own. Follow-up care is a key part of your treatment and safety.  Be sure to make and go to all appointments, and call your doctor if you are having problems. It's also a good idea to know your test results and keep a list of the medicines you take. How can you care for yourself at home? · If your doctor recommends or prescribes medicine, take it exactly as directed. Call your doctor if you think you are having a problem with your medicine. · Do not drive while you feel dizzy. · Try to prevent falls. Steps you can take include:  ¨ Using nonskid mats, adding grab bars near the tub, and using night-lights. ¨ Clearing your home so that walkways are free of anything you might trip on. ¨ Letting family and friends know that you have been feeling dizzy. This will help them know how to help you. When should you call for help? Call 911 anytime you think you may need emergency care. For example, call if:  · You passed out (lost consciousness). · You have dizziness along with symptoms of a heart attack. These may include:  ¨ Chest pain or pressure, or a strange feeling in the chest.  ¨ Sweating. ¨ Shortness of breath. ¨ Nausea or vomiting. ¨ Pain, pressure, or a strange feeling in the back, neck, jaw, or upper belly or in one or both shoulders or arms. ¨ Lightheadedness or sudden weakness. ¨ A fast or irregular heartbeat. · You have symptoms of a stroke. These may include:  ¨ Sudden numbness, tingling, weakness, or loss of movement in your face, arm, or leg, especially on only one side of your body. ¨ Sudden vision changes. ¨ Sudden trouble speaking. ¨ Sudden confusion or trouble understanding simple statements. ¨ Sudden problems with walking or balance. ¨ A sudden, severe headache that is different from past headaches. Call your doctor now or seek immediate medical care if:  · You feel dizzy and have a fever, headache, or ringing in your ears. · You have new or increased nausea and vomiting. · Your dizziness does not go away or comes back.   Watch closely for changes in your health, and be sure to contact your doctor if:  · You do not get better as expected. Where can you learn more? Go to http://surinder-marley.info/. Enter G382 in the search box to learn more about \"Dizziness: Care Instructions. \"  Current as of: March 20, 2017  Content Version: 11.3  © 5982-3519 [employer]. Care instructions adapted under license by [employer] (which disclaims liability or warranty for this information). If you have questions about a medical condition or this instruction, always ask your healthcare professional. [address] any warranty or liability for your use of this information.

## 2017-10-05 ENCOUNTER — OFFICE VISIT (OUTPATIENT)
Dept: CARDIOLOGY CLINIC | Age: 77
End: 2017-10-05

## 2017-10-05 VITALS
BODY MASS INDEX: 39 KG/M2 | WEIGHT: 220.1 LBS | RESPIRATION RATE: 16 BRPM | HEIGHT: 63 IN | SYSTOLIC BLOOD PRESSURE: 130 MMHG | OXYGEN SATURATION: 96 % | DIASTOLIC BLOOD PRESSURE: 80 MMHG | HEART RATE: 61 BPM

## 2017-10-05 DIAGNOSIS — I10 ESSENTIAL HYPERTENSION: ICD-10-CM

## 2017-10-05 DIAGNOSIS — R00.1 BRADYCARDIA: ICD-10-CM

## 2017-10-05 DIAGNOSIS — I48.0 PAROXYSMAL ATRIAL FIBRILLATION (HCC): Primary | ICD-10-CM

## 2017-10-05 RX ORDER — METOPROLOL TARTRATE 25 MG/1
12.5 TABLET, FILM COATED ORAL 2 TIMES DAILY
Qty: 180 TAB | Refills: 0 | COMMUNITY
Start: 2017-10-05 | End: 2018-03-22 | Stop reason: SDUPTHER

## 2017-10-05 NOTE — PROGRESS NOTES
Buddy Ventura DNP, ANP-BC  Subjective/HPI:     Pranay Azevedo is a 78 y.o. female is here for routine f/u. The patient denies chest pain/ shortness of breath, orthopnea, PND, LE edema, palpitations, syncope, presyncope. Patient has reported episodes of dizziness and increasing fatigue. Has been placed on Antivert without any improvement in her dizziness. She reports her dizziness as more of a rocking sensation on a boat denies tunnel vision there was no reported association with any fluttering or palpitations. She also reports that dizziness can also occur with changing positions. Has maintain Eliquis without any reported bruising bleeding. PCP Provider  Susan Luo MD  Past Medical History:   Diagnosis Date    Arthritis     GERD (gastroesophageal reflux disease)     Hypercholesterolemia     Hypertension     Hypothyroid 2004      Past Surgical History:   Procedure Laterality Date    HX APPENDECTOMY  1960    HX TUBAL LIGATION  1969     Allergies   Allergen Reactions    Lisinopril Cough      Family History   Problem Relation Age of Onset    Heart Disease Father 79    Hypertension Father     Stroke Father     Psychiatric Disorder Sister     Cancer Brother     Psychiatric Disorder Brother       Current Outpatient Prescriptions   Medication Sig    metoprolol tartrate (LOPRESSOR) 25 mg tablet Take 0.5 Tabs by mouth two (2) times a day.  naltrexone-buPROPion (CONTRAVE) 8-90 mg TbER ER tablet Take 2 Tabs by mouth ACB/HS.  amLODIPine (NORVASC) 2.5 mg tablet Take 1 Tab by mouth daily.  MILK THISTLE PO Take  by mouth.  apixaban (ELIQUIS) 5 mg tablet Take 1 Tab by mouth two (2) times a day.  levothyroxine (SYNTHROID) 112 mcg tablet Take 1 Tab by mouth Daily (before breakfast).  multivitamin (ONE A DAY) tablet Take 1 Tab by mouth daily.  coenzyme q10-vitamin e (COQ10 ) 100-100 mg-unit cap Take  by mouth.  aspirin delayed-release 81 mg tablet Take  by mouth daily.     meloxicam (MOBIC) 15 mg tablet Take 1 Tab by mouth daily.  Omega-3-DHA-EPA-Fish Oil 1,200 (144-216) mg cap Take 90 Caps by mouth two (2) times a day. No current facility-administered medications for this visit. Vitals:    10/05/17 1031 10/05/17 1042   BP: 120/72 130/80   Pulse: 61    Resp: 16    SpO2: 96%    Weight: 220 lb 1.6 oz (99.8 kg)    Height: 5' 3\" (1.6 m)      Social History     Social History    Marital status:      Spouse name: N/A    Number of children: N/A    Years of education: N/A     Occupational History    Not on file. Social History Main Topics    Smoking status: Never Smoker    Smokeless tobacco: Never Used    Alcohol use Yes      Comment: 2-3 glasses of wine/daily    Drug use: No    Sexual activity: Yes     Partners: Male     Birth control/ protection: None     Other Topics Concern    Not on file     Social History Narrative    sailing       I have reviewed the nurses notes, vitals, problem list, allergy list, medical history, family, social history and medications. Review of Symptoms:    General: Pt denies excessive weight gain or loss. Pt is able to conduct ADL's.  + Patient experiencing fatigue  HEENT: Denies blurred vision, headaches, epistaxis and difficulty swallowing. Respiratory: Denies shortness of breath, BRICEÑO, wheezing or stridor. Cardiovascular: Denies precordial pain, palpitations, edema or PND  Gastrointestinal: Denies poor appetite, indigestion, abdominal pain or blood in stool  Musculoskeletal: Denies pain or swelling from muscles or joints  Neurologic: Denies tremor, paresthesias.  + Patient experiencing dizziness  Skin: Denies rash, itching or texture change. Physical Exam:      General: Well developed, in no acute distress, cooperative and alert  HEENT: No carotid bruits, no JVD, trach is midline. Neck Supple, PEERL, EOM intact. Heart:  Normal S1/S2 negative S3 or S4.  Regular, no murmur, gallop or rub.   Respiratory: Clear bilaterally x 4, no wheezing or rales  Abdomen:   Soft, non-tender, no masses, bowel sounds are active.   Extremities:  No edema, normal cap refill, no cyanosis, atraumatic. Neuro: A&Ox3, speech clear, gait stable. Skin: Skin color is normal. No rashes or lesions. Non diaphoretic  Vascular: 2+ pulses symmetric in all extremities    Cardiographics    ECG: Marked sinus bradycardia heart rate 39 bpm  Results for orders placed or performed in visit on 03/15/17   CARDIAC HOLTER MONITOR, 24 HOURS    Narrative    ECG Monitor/24 hours, Complete    Reason for Holter Monitor   PALPITATIONS    Heartbeat    Slowest 47  Average 63  Fastest  104    Results:   Underlying Rhythm: Normal sinus rhythm      Atrial Arrhythmias: premature atrial contractions; occasional and 7 short runs of paroxysmal atrial fibrillation            AV Conduction: normal    Ventricular Arrhythmias: premature ventricular contractions; rare     ST Segment Analysis:non-specific changes     Symptom Correlation:  Symptoms may correspond to premature beats. Short runs of atrial fibrillation appear to have been asymptomatic.     Sejal Barros MD           Resting heart rate 39 bpm on EKG upon sitting and talking heart rate increased to 58 bpm walking 300 feet heart rate increased 117 bpm.      Cardiology Labs:  Lab Results   Component Value Date/Time    Cholesterol, total 224 09/13/2016 12:12 PM    HDL Cholesterol 51 09/13/2016 12:12 PM    LDL, calculated 120 09/13/2016 12:12 PM    Triglyceride 264 09/13/2016 12:12 PM       Lab Results   Component Value Date/Time    Sodium 141 03/02/2017 10:03 AM    Potassium 4.4 03/02/2017 10:03 AM    Chloride 101 03/02/2017 10:03 AM    CO2 24 03/02/2017 10:03 AM    Glucose 83 03/02/2017 10:03 AM    BUN 12 03/02/2017 10:03 AM    Creatinine 0.74 03/02/2017 10:03 AM    BUN/Creatinine ratio 16 03/02/2017 10:03 AM    GFR est AA 92 03/02/2017 10:03 AM    GFR est non-AA 80 03/02/2017 10:03 AM    Calcium 9.4 03/02/2017 10:03 AM Bilirubin, total 0.5 09/13/2016 12:12 PM    AST (SGOT) 43 09/13/2016 12:12 PM    Alk. phosphatase 74 09/13/2016 12:12 PM    Protein, total 6.5 09/13/2016 12:12 PM    Albumin 4.1 09/13/2016 12:12 PM    A-G Ratio 1.7 09/13/2016 12:12 PM    ALT (SGPT) 52 09/13/2016 12:12 PM           Assessment:     Assessment:     Diagnoses and all orders for this visit:    1. Paroxysmal atrial fibrillation (HCC)  -     AMB POC EKG ROUTINE W/ 12 LEADS, INTER & REP  -     AMB POC EKG ROUTINE W/ 12 LEADS, INTER & REP    2. Bradycardia    3. Essential hypertension        ICD-10-CM ICD-9-CM    1. Paroxysmal atrial fibrillation (HCC) I48.0 427.31 AMB POC EKG ROUTINE W/ 12 LEADS, INTER & REP      AMB POC EKG ROUTINE W/ 12 LEADS, INTER & REP   2. Bradycardia R00.1 427.89    3. Essential hypertension I10 401.9      Orders Placed This Encounter    AMB POC EKG ROUTINE W/ 12 LEADS, INTER & REP     Order Specific Question:   Reason for Exam:     Answer:   routine    AMB POC EKG ROUTINE W/ 12 LEADS, INTER & REP     Order Specific Question:   Reason for Exam:     Answer:   routine    metoprolol tartrate (LOPRESSOR) 25 mg tablet     Sig: Take 0.5 Tabs by mouth two (2) times a day. Dispense:  180 Tab     Refill:  0        Plan:     PAF:  Patient is a 80-years-old female with a history of paroxysmal atrial fibrillation presenting today with sinus bradycardia heart rate 39 bpm with mild symptoms. she is chronotropically competent with walking in the hallway. Will reduce beta-blocker Lopressor 12.5 mg twice a day and have her follow her blood pressure and pulse, symptoms at home. She will call if persistently dizzy or heart rates less than 45 bpm.  Continue Eliquis. History of snoring:  She states she used to snore but no longer does. She states she has been tested for sleep apnea in approximately the spring 2017 with no evidence of sleep apnea.     Morbid obesity:  She has been talking with her  about pursuing a significantly carbohydrate restricted diet. I told her I agree with significant carbohydrate reduction and in the long run to introduce small quantities of high-fiber carbohydrates. Goal of 22 pounds (10%) weight loss in 6 months through that and increased exercise. Follow up in 6 months, sooner PRN.      Severiano John MD

## 2017-10-05 NOTE — MR AVS SNAPSHOT
Visit Information Date & Time Provider Department Dept. Phone Encounter #  
 10/5/2017 10:45 AM Kalia Pemberton, [address] [facility] [phone] [id number] Upcoming Health Maintenance Date Due  
 GLAUCOMA SCREENING Q2Y 12/30/2017* MEDICARE YEARLY EXAM 12/30/2017* COLONOSCOPY 12/30/2017* DTaP/Tdap/Td series (2 - Td) 9/27/2027 *Topic was postponed. The date shown is not the original due date. Allergies as of 10/5/2017  Review Complete On: 10/5/2017 By: Kalia Pemberton MD  
  
 Severity Noted Reaction Type Reactions Lisinopril  06/08/2015    Cough Current Immunizations  Reviewed on 9/21/2017 Name Date Influenza High Dose Vaccine PF 9/21/2017 Influenza Vaccine Karole Burows) 10/23/2014 Influenza Vaccine (Quad) PF 9/13/2016, 11/13/2015 Pneumococcal Conjugate (PCV-13) 12/31/2015 Pneumococcal Polysaccharide (PPSV-23) 9/13/2016 Not reviewed this visit You Were Diagnosed With   
  
 Codes Comments Paroxysmal atrial fibrillation (HCC)    -  Primary ICD-10-CM: I48.0 ICD-9-CM: 427.31 Bradycardia     ICD-10-CM: R00.1 ICD-9-CM: 427.89 Essential hypertension     ICD-10-CM: I10 
ICD-9-CM: 401.9 Vitals BP Pulse Resp Height(growth percentile) Weight(growth percentile) SpO2  
 130/80 (BP 1 Location: Right arm, BP Patient Position: Sitting) 61 16 5' 3\" (1.6 m) 220 lb 1.6 oz (99.8 kg) 96% BMI OB Status Smoking Status 38.99 kg/m2 Ablation Never Smoker Vitals History BMI and BSA Data Body Mass Index Body Surface Area  
 38.99 kg/m 2 2.11 m 2 Preferred Pharmacy Pharmacy Name Phone [facility] [address] [facility] [address] [facility] [phone] Your Updated Medication List  
  
   
This list is accurate as of: 10/5/17 11:46 AM.  Always use your most recent med list. amLODIPine 2.5 mg tablet Commonly known as:  Valerie Salazar Take 1 Tab by mouth daily. apixaban 5 mg tablet Commonly known as:  Karo Scripture Take 1 Tab by mouth two (2) times a day. aspirin delayed-release 81 mg tablet Take  by mouth daily. COQ10  100-100 mg-unit Cap Generic drug:  coenzyme q10-vitamin e Take  by mouth.  
  
 levothyroxine 112 mcg tablet Commonly known as:  SYNTHROID Take 1 Tab by mouth Daily (before breakfast). meloxicam 15 mg tablet Commonly known as:  MOBIC Take 1 Tab by mouth daily. metoprolol tartrate 25 mg tablet Commonly known as:  LOPRESSOR Take 0.5 Tabs by mouth two (2) times a day. MILK THISTLE PO Take  by mouth.  
  
 multivitamin tablet Commonly known as:  ONE A DAY Take 1 Tab by mouth daily. naltrexone-buPROPion 8-90 mg Tber ER tablet Commonly known as:  Riley Legacy Take 2 Tabs by mouth ACB/HS. Omega-3-DHA-EPA-Fish Oil 1,200 (144-216) mg Cap Take 90 Caps by mouth two (2) times a day. We Performed the Following AMB POC EKG ROUTINE W/ 12 LEADS, INTER & REP [74043 CPT(R)] AMB POC EKG ROUTINE W/ 12 LEADS, INTER & REP [71698 CPT(R)] Introducing [facility]! Kmi Foreman introduces [employer] patient portal. Now you can access parts of your medical record, email your doctor's office, and request medication refills online. 1. In your internet browser, go to [email or web address] 2. Click on the First Time User? Click Here link in the Sign In box. You will see the New Member Sign Up page. 3. Enter your [employer] Access Code exactly as it appears below. You will not need to use this code after youve completed the sign-up process. If you do not sign up before the expiration date, you must request a new code. · [employer] Access Code: [email or web address] Expires: 12/20/2017  3:00 PM 
 
4. Enter the last four digits of your Social Security Number (xxxx) and Date of Birth (mm/dd/yyyy) as indicated and click Submit.  You will be taken to the next sign-up page. 5. Create a [employer] ID. This will be your [employer] login ID and cannot be changed, so think of one that is secure and easy to remember. 6. Create a [employer] password. You can change your password at any time. 7. Enter your Password Reset Question and Answer. This can be used at a later time if you forget your password. 8. Enter your e-mail address. You will receive e-mail notification when new information is available in [address]. 9. Click Sign Up. You can now view and download portions of your medical record. 10. Click the Download Summary menu link to download a portable copy of your medical information. If you have questions, please visit the Frequently Asked Questions section of the [employer] website. Remember, [employer] is NOT to be used for urgent needs. For medical emergencies, dial 911. Now available from your iPhone and Android! Please provide this summary of care documentation to your next provider. Your primary care clinician is listed as Aparna Puckett. If you have any questions after today's visit, please call [phone].

## 2017-10-23 RX ORDER — NALTREXONE HYDROCHLORIDE AND BUPROPION HYDROCHLORIDE 8; 90 MG/1; MG/1
TABLET, EXTENDED RELEASE ORAL
Qty: 120 TAB | Refills: 1 | Status: SHIPPED | OUTPATIENT
Start: 2017-10-23 | End: 2018-03-06 | Stop reason: SDUPTHER

## 2017-10-23 NOTE — TELEPHONE ENCOUNTER
Last office visit:  9/21/17  Last filled:  Contrave 6/27/17 #120 X 1 refill  No changes  No follow up scheduled

## 2017-10-25 DIAGNOSIS — E03.9 HYPOTHYROIDISM, UNSPECIFIED TYPE: ICD-10-CM

## 2017-10-25 RX ORDER — LEVOTHYROXINE SODIUM 112 UG/1
TABLET ORAL
Qty: 90 TAB | Refills: 1 | Status: SHIPPED | OUTPATIENT
Start: 2017-10-25 | End: 2018-03-08 | Stop reason: SDUPTHER

## 2017-10-25 RX ORDER — LEVOTHYROXINE SODIUM 112 UG/1
TABLET ORAL
Qty: 90 TAB | Refills: 1 | Status: CANCELLED | OUTPATIENT
Start: 2017-10-25

## 2017-10-25 NOTE — TELEPHONE ENCOUNTER
Last office visit:  9/21/17  Last filled:  Synthroid 10/25/16 #90 X 1 refill  No changes  No follow up visit scheduled

## 2017-10-25 NOTE — TELEPHONE ENCOUNTER
Requested Prescriptions     Pending Prescriptions Disp Refills    levothyroxine (SYNTHROID) 112 mcg tablet 90 Tab 1

## 2017-10-27 ENCOUNTER — TELEPHONE (OUTPATIENT)
Dept: CARDIOLOGY CLINIC | Age: 77
End: 2017-10-27

## 2017-10-27 NOTE — TELEPHONE ENCOUNTER
Patient calling to report /68 pulse 52 with C/O of feeling a little off since medication changes from 10-5-17 please advise.

## 2017-10-28 NOTE — TELEPHONE ENCOUNTER
Her blood pressure is relatively low and her pulse is relatively slow. We recently decreased the metoprolol to allow her to have a little more blood pressure and a little bit faster heart rate. If she actually feels worse with the reduction in dose and feels better on the higher dose, she can go back. After she figures out what is best for her, let us know what dose she is going to take in the long run.

## 2017-10-31 NOTE — TELEPHONE ENCOUNTER
Spoke with Patient she will be monitoring her BP and pulse taking the 25 mg in am of Metoprolol and holding the 12.5 in the evening unless BP and pulse elevated will call with results in a  couple weeks.

## 2017-12-04 DIAGNOSIS — I48.0 PAROXYSMAL ATRIAL FIBRILLATION (HCC): ICD-10-CM

## 2017-12-04 NOTE — TELEPHONE ENCOUNTER
Last office visit:  9/21/17  Last filled:  Eliquis BID 3/29/17 #60 X 6 refills  No changes  No follow up with Dr Stephie Rogers

## 2017-12-04 NOTE — TELEPHONE ENCOUNTER
Requested Prescriptions     Pending Prescriptions Disp Refills    apixaban (ELIQUIS) 5 mg tablet 60 Tab 6     Sig: Take 1 Tab by mouth two (2) times a day.

## 2018-01-17 ENCOUNTER — OFFICE VISIT (OUTPATIENT)
Dept: INTERNAL MEDICINE CLINIC | Age: 78
End: 2018-01-17

## 2018-01-17 VITALS
DIASTOLIC BLOOD PRESSURE: 64 MMHG | BODY MASS INDEX: 38.27 KG/M2 | RESPIRATION RATE: 16 BRPM | WEIGHT: 216 LBS | OXYGEN SATURATION: 98 % | TEMPERATURE: 95 F | HEART RATE: 48 BPM | SYSTOLIC BLOOD PRESSURE: 159 MMHG | HEIGHT: 63 IN

## 2018-01-17 DIAGNOSIS — H65.92 OME (OTITIS MEDIA WITH EFFUSION), LEFT: Primary | ICD-10-CM

## 2018-01-17 DIAGNOSIS — I48.0 PAROXYSMAL ATRIAL FIBRILLATION (HCC): ICD-10-CM

## 2018-01-17 RX ORDER — AMOXICILLIN 500 MG/1
500 TABLET, FILM COATED ORAL 3 TIMES DAILY
Qty: 21 TAB | Refills: 0 | Status: SHIPPED | OUTPATIENT
Start: 2018-01-17 | End: 2018-01-24

## 2018-01-17 NOTE — MR AVS SNAPSHOT
[address] 0972 [facility] 
[phone] Patient: Corona Gatica MRN: [id number] JSI:[id number] Visit Information Date & Time Provider Department Dept. Phone Encounter #  
 1/17/2018 10:30 AM Joanne Espinosa MD [facility] Damon Sparks [id number] Follow-up Instructions Return in about 3 months (around 4/17/2018), or if symptoms worsen or fail to improve, for routine follow up. Your Appointments 5/31/2018 11:30 AM  
6 MONTH with Cleo Valdes MD  
[facility]) Appt Note: $0CP 10/5/17ksr [address]  
[phone] [address] Upcoming Health Maintenance Date Due  
 GLAUCOMA SCREENING Q2Y 12/18/2007 COLONOSCOPY 7/27/2015 MEDICARE YEARLY EXAM 9/14/2017 BREAST CANCER SCRN MAMMOGRAM 10/6/2018 DTaP/Tdap/Td series (2 - Td) 9/27/2027 Allergies as of 1/17/2018  Review Complete On: 1/17/2018 By: Joanne Espinosa MD  
  
 Severity Noted Reaction Type Reactions Lisinopril  06/08/2015    Cough Current Immunizations  Reviewed on 9/21/2017 Name Date Influenza High Dose Vaccine PF 9/21/2017 Influenza Vaccine Judye Binet) 10/23/2014 Influenza Vaccine (Quad) PF 9/13/2016, 11/13/2015 Pneumococcal Conjugate (PCV-13) 12/31/2015 Pneumococcal Polysaccharide (PPSV-23) 9/13/2016 Not reviewed this visit You Were Diagnosed With   
  
 Codes Comments OME (otitis media with effusion), left    -  Primary ICD-10-CM: H65.92 
ICD-9-CM: 381. 4 Paroxysmal atrial fibrillation (HCC)     ICD-10-CM: I48.0 ICD-9-CM: 427.31 Vitals BP Pulse Temp Resp Height(growth percentile) Weight(growth percentile) 159/64 (BP 1 Location: Left arm, BP Patient Position: Sitting) (!) 48 95 °F (35 °C) (Oral) 16 5' 3\" (1.6 m) 216 lb (98 kg) SpO2 BMI OB Status Smoking Status 98% 38.26 kg/m2 Ablation Never Smoker Vitals History BMI and BSA Data Body Mass Index Body Surface Area  
 38.26 kg/m 2 2.09 m 2 Preferred Pharmacy Pharm[facility] Name Phone [facility] [phone] Your Updated Medication List  
  
   
This list is accurate as of: 1/17/18 11:36 AM.  Always use your most recent med list. amLODIPine 2.5 mg tablet Commonly known as:  Ardeen Squire Take 1 Tab by mouth daily. amoxicillin 500 mg Tab Take 500 mg by mouth three (3) times daily for 7 days. apixaban 5 mg tablet Commonly known as:  Ramon Proctor Take 1 Tab by mouth two (2) times a day. Appointment needed to refill this medication again. CONTRAVE 8-90 mg Tber ER tablet Generic drug:  naltrexone-buPROPion TAKE 2 TABLETS BY MOUTH BEFORE BREAKFAST AND AT BEDTIME  
  
 COQ10  100-100 mg-unit Cap Generic drug:  coenzyme q10-vitamin e Take  by mouth.  
  
 levothyroxine 112 mcg tablet Commonly known as:  SYNTHROID  
TAKE ONE TABLET BY MOUTH ONCE DAILY BEFORE BREAKFAST  
  
 metoprolol tartrate 25 mg tablet Commonly known as:  LOPRESSOR Take 0.5 Tabs by mouth two (2) times a day. MILK THISTLE PO Take  by mouth.  
  
 multivitamin tablet Commonly known as:  ONE A DAY Take 1 Tab by mouth daily. Prescriptions Sent to Pharmacy Refills  
 amoxicillin 500 mg tab 0 Sig: Take 500 mg by mouth three (3) times daily for 7 days. Class: Normal  
 Pharmacy: [address] [place] [address] 2002 [facility], [address] [place] [address] #: [phone] Route: Oral  
  
Follow-up Instructions Return in about 3 months (around 4/17/2018), or if symptoms worsen or fail to improve, for routine follow up. Introducing [facility]!    
 [employer] introduces [employer] patient portal. Now you can access parts of your medical record, email your doctor's office, and request medication refills online. 1. In your internet browser, go to [email or web address] 2. Click on the First Time User? Click Here link in the Sign In box. You will see the New Member Sign Up page. 3. Enter your [employer] Access Code exactly as it appears below. You will not need to use this code after youve completed the sign-up process. If you do not sign up before the expiration date, you must request a new code. · [employer] Access Code: [email or web address] Expires: 4/17/2018 10:30 AM 
 
4. Enter the last four digits of your Social Security Number (xxxx) and Date of Birth (mm/dd/yyyy) as indicated and click Submit. You will be taken to the next sign-up page. 5. Create a [employer] ID. This will be your [employer] login ID and cannot be changed, so think of one that is secure and easy to remember. 6. Create a [employer] password. You can change your password at any time. 7. Enter your Password Reset Question and Answer. This can be used at a later time if you forget your password. 8. Enter your e-mail address. You will receive e-mail notification when new information is available in [address] 9. Click Sign Up. You can now view and download portions of your medical record. 10. Click the Download Summary menu link to download a portable copy of your medical information. If you have questions, please visit the Frequently Asked Questions section of the [employer] website. Remember, [employer] is NOT to be used for urgent needs. For medical emergencies, dial 911. Now available from your iPhone and Android! Please provide this summary of care documentation to your next provider. Your primary care clinician is listed as Radha Townsend. If you have any questions after today's visit, please call [phone].

## 2018-01-17 NOTE — PROGRESS NOTES
Subjective:   Mariela Jarrett is a 78 y.o. female who complains of congestion, sore throat, nasal blockage, cough described as productive of yellow sputum and bilateral ear pressure for 10 days, gradually improving since that time. She denies a history of chest pain, shortness of breath and wheezing. Evaluation to date: none. Treatment to date: OTC products. Patient does not smoke cigarettes. Relevant PMH: atrial fibrillation on Eliquis. Allergies   Allergen Reactions    Lisinopril Cough         Patient Active Problem List    Diagnosis Date Noted    Paroxysmal atrial fibrillation ([facility] [address] 03/20/2017    Hypothyroidism 09/10/2014    HTN (hypertension) 09/10/2014    Abnormal heart sounds 09/05/2014    Generalized OA 09/05/2014     Current Outpatient Prescriptions   Medication Sig Dispense Refill    apixaban (ELIQUIS) 5 mg tablet Take 1 Tab by mouth two (2) times a day. Appointment needed to refill this medication again. 60 Tab 2    levothyroxine (SYNTHROID) 112 mcg tablet TAKE ONE TABLET BY MOUTH ONCE DAILY BEFORE BREAKFAST 90 Tab 1    CONTRAVE 8-90 mg TbER ER tablet TAKE 2 TABLETS BY MOUTH BEFORE BREAKFAST AND AT BEDTIME 120 Tab 1    metoprolol tartrate (LOPRESSOR) 25 mg tablet Take 0.5 Tabs by mouth two (2) times a day. 180 Tab 0    amLODIPine (NORVASC) 2.5 mg tablet Take 1 Tab by mouth daily. 90 Tab 1    MILK THISTLE PO Take  by mouth.  multivitamin (ONE A DAY) tablet Take 1 Tab by mouth daily.  coenzyme q10-vitamin e (COQ10 ) 100-100 mg-unit cap Take  by mouth. Allergies   Allergen Reactions    Lisinopril Cough     Social History   Substance Use Topics    Smoking status: Never Smoker    Smokeless tobacco: Never Used    Alcohol use Yes      Comment: 2-3 glasses of wine/daily        Review of Systems  Pertinent items are noted in HPI.     Objective:     Visit Vitals    /64 (BP 1 Location: Left arm, BP Patient Position: Sitting)    Pulse (!) 48    Temp 95 °F (35 °C) (Oral)    Resp 16    Ht 5' 3\" (1.6 m)    Wt 216 lb (98 kg)    SpO2 98%    BMI 38.26 kg/m2     General:  alert, cooperative, no distress   Eyes: negative   Ears: abnormal TM AS - erythematous, dull, bulging   Sinuses: tenderness over both frontal   Mouth:  Lips, mucosa, and tongue normal. Teeth and gums normal   Neck: supple, symmetrical, trachea midline and no adenopathy. Heart: S1 and S2 normal, no murmurs noted. Lungs: clear to auscultation bilaterally   Abdomen: soft, non-tender. Bowel sounds normal. No masses,  no organomegaly      Assessment/Plan:   otitis media  Antibiotics per orders. RTC prn.     Follow-up Disposition:  Return in about 6 weeks (around 2/28/2018), or if symptoms worsen or fail to improve, for medicare annual.

## 2018-02-01 NOTE — MR AVS SNAPSHOT
[facility]     eMERGENCY dEPARTMENT eNCOUnter         Pt Name: Dhiraj Luis  MRN: [id number]  [place] 1997  Date of evaluation: 2/1/2018  Provider: Audrey Stout DO    CHIEF COMPLAINT       Chief Complaint   Patient presents with    Migraine       Nurses Notes reviewed and I agree except as noted in the HPI. HISTORY OF PRESENT ILLNESS    Dhiraj Luis is a 27 y.o. female who presents to the ED with a past medical history of migraines for the evaluation of a headache onset 1 week ago. Patient went to [facility] ED yesterday and was given Reglan and Benadryl which gave her relief. Patient states her headache gets better when she lays down. She reports associated blurry vision and dizziness. She denies vomiting, fever, rash, dysuria, abdominal pain, vaginal discharge or vaginal bleeding. Patient states she tried Aspirin and Caffeine at home which did not give her relief. The patient follows Dr. Sriram Snyder MD as her neurologist. Patient had an MRI of her brain on 02/04/17 I revealed no acute process. The patient states that her symptoms are typical of her migraine headaches in the past. They're not different in characteristic, severity, or associate his symptoms. Social history:     Patient has not had her menstrual period in 9 months due to being on the Depo shot. This HPI was provided by the patient. REVIEW OF SYSTEMS     Unless otherwise stated in this report the patient's positive and negative responses for review of systems for constitutional, eyes, ENT, cardiovascular, respiratory, gastrointestinal, neurological, genitourinary, musculoskeletal, and integumentary systems and related systems to the presenting problem are either as stated in the HPI or were not pertinent or were negative for the symptoms and/or complaints related to the presenting medical problem. PAST MEDICAL HISTORY    has a past medical history of Anxiety; Insomnia; and Migraine.     SURGICAL Visit Information Date & Time Provider Department Dept. Phone Encounter #  
 9/21/2017  1:45 PM Lukas Alarcon, 1 [facility] 21  Follow-up Instructions Return if symptoms worsen or fail to improve. Your Appointments 10/5/2017 10:45 AM  
6 MONTH with Sixto Gage MD  
[facility] [address] [facility]) Appt Note: Per Dr Jim Joseph $0CP REM  
 [id number] [facility] [address]  
[phone] [address] [facility] [address] Upcoming Health Maintenance Date Due DTaP/Tdap/Td series (1 - Tdap) 12/18/1963 GLAUCOMA SCREENING Q2Y 12/18/2007 COLONOSCOPY 7/27/2015 INFLUENZA AGE 9 TO ADULT 8/1/2017 MEDICARE YEARLY EXAM 9/14/2017 Allergies as of 9/21/2017  Review Complete On: 9/21/2017 By: Lukas Alarcon NP Severity Noted Reaction Type Reactions Lisinopril  06/08/2015    Cough Current Immunizations  Reviewed on 9/13/2016 Name Date Influenza Vaccine [place]) 10/23/2014 Influenza Vaccine (Quad) PF  Incomplete, 9/13/2016, 11/13/2015 Pneumococcal Conjugate (PCV-13) 12/31/2015 Pneumococcal Polysaccharide (PPSV-23) 9/13/2016 Not reviewed this visit You Were Diagnosed With   
  
 Codes Comments Dizziness    -  Primary ICD-10-CM: T43 ICD-9-CM: 780.4 Encounter for immunization     ICD-10-CM: T22 ICD-9-CM: V03.89 Vitals BP Pulse Temp Resp Height(growth percentile) Weight(growth percentile) 132/80 (BP 1 Location: Left arm, BP Patient Position: Sitting) (!) 53 97.1 °F (36.2 °C) (Oral) 16 5' 3\" (1.6 m) 220 lb 12.8 oz (100.2 kg) SpO2 BMI OB Status Smoking Status 97% 39.11 kg/m2 Ablation Never Smoker Vitals History BMI and BSA Data Body Mass Index Body Surface Area  
 39.11 kg/m 2 2.11 m 2 Preferred Pharmacy Pharmacy Name Phone  [facility] [address] [facility], [facility] [phone] Your Updated Medication List  
  
   
This list is accurate as of: 9/21/17  3:01 PM.  Always use your most recent med list. amLODIPine 2.5 mg tablet Commonly known as:  Diananoemi Dodesiree Take 1 Tab by mouth daily. apixaban 5 mg tablet Commonly known as:  Ti León Take 1 Tab by mouth two (2) times a day. aspirin delayed-release 81 mg tablet Take  by mouth daily. COQ10  100-100 mg-unit Cap Generic drug:  coenzyme q10-vitamin e Take  by mouth.  
  
 levothyroxine 112 mcg tablet Commonly known as:  SYNTHROID Take 1 Tab by mouth Daily (before breakfast). meclizine 25 mg tablet Commonly known as:  ANTIVERT Take 1 Tab by mouth three (3) times daily as needed for up to 10 days. Indications: VERTIGO  
  
 meloxicam 15 mg tablet Commonly known as:  MOBIC Take 1 Tab by mouth daily. metoprolol tartrate 25 mg tablet Commonly known as:  LOPRESSOR  
TAKE ONE TABLET BY MOUTH TWICE DAILY TO  HELP  PREVENT  ATRIAL  FIBRILLATION  
  
 MILK THISTLE PO Take  by mouth.  
  
 multivitamin tablet Commonly known as:  ONE A DAY Take 1 Tab by mouth daily. naltrexone-buPROPion 8-90 mg Tber ER tablet Commonly known as:  Tracy Slider Take 2 Tabs by mouth ACB/HS. Omega-3-DHA-EPA-Fish Oil 1,200 (144-216) mg Cap Take 90 Caps by mouth two (2) times a day. Prescriptions Sent to Pharmacy Refills  
 meclizine (ANTIVERT) 25 mg tablet 0 Sig: Take 1 Tab by mouth three (3) times daily as needed for up to 10 days. Indications: VERTIGO Class: Normal  
 Pharmacy: [address] Ph #: [phone] Route: Oral  
  
We Performed the Following INFLUENZA VIRUS VAC QUAD,SPLIT,PRESV FREE SYRINGE IM [id number] CPT(R)] Follow-up Instructions Return if symptoms worsen or fail to improve. Patient Instructions Metoprolol (By mouth) Metoprolol (met-oh-PROE-lol) Treats high blood pressure, angina (chest pain), and heart failure. May lower the risk of death after a heart attack. This medicine is a beta-blocker. Brand Name(s): Lopressor, Toprol XL There may be other brand names for this medicine. When This Medicine Should Not Be Used: This medicine is not right for everyone. Do not use if you had an allergic reaction to metoprolol or similar medicines. Do not use this medicine if you have certain blood circulation or heart problems. Ask your doctor about these problems. How to Use This Medicine:  
Tablet, Long Acting Tablet · Take your medicine as directed. Your dose may need to be changed several times to find what works best for you. · Take this medicine with a meal or right after a meal. Take this medicine the same way every day, at the same time. · Swallow the tablet whole with a glass of water. You may break the extended-release tablet in half, but do not chew or crush it. · Missed dose: Take a dose as soon as you remember. If it is almost time for your next dose, wait until then and take a regular dose. Do not take extra medicine to make up for a missed dose. · Store the medicine in a closed container at room temperature, away from heat, moisture, and direct light. Drugs and Foods to Avoid: Ask your doctor or pharmacist before using any other medicine, including over-the-counter medicines, vitamins, and herbal products. · Some medicines can affect how metoprolol works. Tell your doctor if you are taking any of the following: ¨ Digoxin, dipyridamole, hydralazine, hydroxychloroquine, methyldopa, quinidine ¨ Medicine to treat depression (such as bupropion, clomipramine, desipramine, fluoxetine, fluvoxamine, paroxetine, sertraline), medicine to treat mental illness (such as chlorpromazine, fluphenazine, haloperidol, thioridazine), medicine for heart rhythm problems (such as propafenone), HIV/AIDS medicine (such as ritonavir), medicine to treat a fungus infection (such as terbinafine), a monoamine oxidase inhibitor (MAOI), an ergot medicine for headaches, a calcium channel blocker (such as amlodipine, diltiazem, verapamil), or an alpha blocker (such as clonidine, prazosin, reserpine, guanethidine) Warnings While Using This Medicine: · Tell your doctor if you are pregnant or breastfeeding, or if you have blood vessel, heart, or circulation problems (such as heart failure, rhythm problems, or slow heartbeat). Tell your doctor if you have kidney disease, liver disease, diabetes, lung disease (such as asthma), an overactive thyroid, or a history of allergies. · This medicine may cause worse symptoms of heart failure while the dose is being adjusted. · Do not stop using this medicine suddenly. Your doctor will need to slowly decrease your dose before you stop it completely. · Tell any doctor or dentist who treats you that you are using this medicine. You may need to stop using this medicine several days before you have surgery or medical tests. · This medicine could lower your blood pressure too much, especially when you first use it or if you are dehydrated. Stand or sit up slowly if you feel lightheaded or dizzy. · This medicine may make you dizzy or drowsy. Do not drive or do anything else that could be dangerous until you know how this medicine affects you. · Your doctor will check your progress and the effects of this medicine at regular visits. Keep all appointments. · Keep all medicine out of the reach of children. Never share your medicine with anyone. Possible Side Effects While Using This Medicine:  
Call your doctor right away if you notice any of these side effects: · Allergic reaction: Itching or hives, swelling in your face or hands, swelling or tingling in your mouth or throat, chest tightness, trouble breathing · Lightheadedness, dizziness, or fainting · Slow heartbeat · Swelling in your hands, ankles, or feet, trouble breathing, tiredness · Worsening chest pain If you notice these less serious side effects, talk with your doctor: · Diarrhea · Mild dizziness or tiredness If you notice other side effects that you think are caused by this medicine, tell your doctor. Call your doctor for medical advice about side effects. You may report side effects to FDA at 8-010-BHN-8707 © 2017 [address] Information is for End User's use only and may not be sold, redistributed or otherwise used for commercial purposes. The above information is an  only. It is not intended as medical advice for individual conditions or treatments. Talk to your doctor, nurse or pharmacist before following any medical regimen to see if it is safe and effective for you. Dizziness: Care Instructions Your Care Instructions Dizziness is the feeling of unsteadiness or fuzziness in your head. It is different than having vertigo, which is a feeling that the room is spinning or that you are moving or falling. It is also different from lightheadedness, which is the feeling that you are about to faint. It can be hard to know what causes dizziness. Some people feel dizzy when they have migraine headaches. Sometimes bouts of flu can make you feel dizzy. Some medical conditions, such as heart problems or high blood pressure, can make you feel dizzy. Many medicines can cause dizziness, including medicines for high blood pressure, pain, or anxiety. If a medicine causes your symptoms, your doctor may recommend that you stop or change the medicine. If it is a problem with your heart, you may need medicine to help your heart work better. If there is no clear reason for your symptoms, your doctor may suggest watching and waiting for a while to see if the dizziness goes away on its own. Follow-up care is a key part of your treatment and safety.  Be sure to make and go to all appointments, and call your doctor if you are having problems. It's also a good idea to know your test results and keep a list of the medicines you take. How can you care for yourself at home? · If your doctor recommends or prescribes medicine, take it exactly as directed. Call your doctor if you think you are having a problem with your medicine. · Do not drive while you feel dizzy. · Try to prevent falls. Steps you can take include: ¨ Using nonskid mats, adding grab bars near the tub, and using night-lights. ¨ Clearing your home so that walkways are free of anything you might trip on. ¨ Letting family and friends know that you have been feeling dizzy. This will help them know how to help you. When should you call for help? Call 911 anytime you think you may need emergency care. For example, call if: 
· You passed out (lost consciousness). · You have dizziness along with symptoms of a heart attack. These may include: ¨ Chest pain or pressure, or a strange feeling in the chest. 
¨ Sweating. ¨ Shortness of breath. ¨ Nausea or vomiting. ¨ Pain, pressure, or a strange feeling in the back, neck, jaw, or upper belly or in one or both shoulders or arms. ¨ Lightheadedness or sudden weakness. ¨ A fast or irregular heartbeat. · You have symptoms of a stroke. These may include: 
¨ Sudden numbness, tingling, weakness, or loss of movement in your face, arm, or leg, especially on only one side of your body. ¨ Sudden vision changes. ¨ Sudden trouble speaking. ¨ Sudden confusion or trouble understanding simple statements. ¨ Sudden problems with walking or balance. ¨ A sudden, severe headache that is different from past headaches. Call your doctor now or seek immediate medical care if: 
· You feel dizzy and have a fever, headache, or ringing in your ears. · You have new or increased nausea and vomiting. · Your dizziness does not go away or comes back. Watch closely for changes in your health, and be sure to contact your doctor if: 
· You do not get better as expected. Where can you learn more? Go to http://surinder-marley.info/. Enter K053 in the search box to learn more about \"Dizziness: Care Instructions. \" Current as of: March 20, 2017 Content Version: 11.3 © 2374-4747 [employer]. Care instructions adapted under license by [employer] (which disclaims liability or warranty for this information). If you have questions about a medical condition or this instruction, always ask your healthcare professional. [place] [address] any warranty or liability for your use of this information. Introducing [address]! [facility] introduces [employer] patient portal. Now you can access parts of your medical record, email your doctor's office, and request medication refills online. 1. In your internet browser, go to [email or web address] 2. Click on the First Time User? Click Here link in the Sign In box. You will see the New Member Sign Up page. 3. Enter your [employer] Access Code exactly as it appears below. You will not need to use this code after youve completed the sign-up process. If you do not sign up before the expiration date, you must request a new code. · [employer] Access Code: [email or web address] Expires: 12/20/2017  3:00 PM 
 
4. Enter the last four digits of your Social Security Number (xxxx) and Date of Birth (mm/dd/yyyy) as indicated and click Submit. You will be taken to the next sign-up page. 5. Create a [employer] ID. This will be your [employer] login ID and cannot be changed, so think of one that is secure and easy to remember. 6. Create a [employer] password. You can change your password at any time. 7. Enter your Password Reset Question and Answer. This can be used at a later time if you forget your password. 8. Enter your e-mail address. You will receive e-mail notification when new information is available in [address] 9. Click Sign Up. You can now view and download portions of your medical record. 10. Click the Download Summary menu link to download a portable copy of your medical information. If you have questions, please visit the Frequently Asked Questions section of the [employer] website. Remember, [employer] is NOT to be used for urgent needs. For medical emergencies, dial 911. Now available from your iPhone and Android! Please provide this summary of care documentation to your next provider. Your primary care clinician is listed as Renetta Busby. If you have any questions after today's visit, please call [phone].

## 2018-02-04 RX ORDER — AMLODIPINE BESYLATE 2.5 MG/1
TABLET ORAL
Qty: 90 TAB | Refills: 1 | Status: SHIPPED | OUTPATIENT
Start: 2018-02-04 | End: 2018-03-08 | Stop reason: SINTOL

## 2018-03-06 RX ORDER — NALTREXONE HYDROCHLORIDE AND BUPROPION HYDROCHLORIDE 8; 90 MG/1; MG/1
TABLET, EXTENDED RELEASE ORAL
Qty: 120 TAB | Refills: 1 | Status: SHIPPED | OUTPATIENT
Start: 2018-03-06 | End: 2018-03-22

## 2018-03-08 ENCOUNTER — OFFICE VISIT (OUTPATIENT)
Dept: INTERNAL MEDICINE CLINIC | Age: 78
End: 2018-03-08

## 2018-03-08 VITALS
HEIGHT: 63 IN | WEIGHT: 225 LBS | HEART RATE: 53 BPM | TEMPERATURE: 96.1 F | OXYGEN SATURATION: 97 % | DIASTOLIC BLOOD PRESSURE: 84 MMHG | RESPIRATION RATE: 20 BRPM | SYSTOLIC BLOOD PRESSURE: 130 MMHG | BODY MASS INDEX: 39.87 KG/M2

## 2018-03-08 DIAGNOSIS — I48.0 PAROXYSMAL ATRIAL FIBRILLATION (HCC): ICD-10-CM

## 2018-03-08 DIAGNOSIS — E03.9 HYPOTHYROIDISM, UNSPECIFIED TYPE: ICD-10-CM

## 2018-03-08 DIAGNOSIS — I10 ESSENTIAL HYPERTENSION: ICD-10-CM

## 2018-03-08 DIAGNOSIS — R42 ORTHOSTATIC DIZZINESS: ICD-10-CM

## 2018-03-08 DIAGNOSIS — S86.912A KNEE STRAIN, LEFT, INITIAL ENCOUNTER: Primary | ICD-10-CM

## 2018-03-08 RX ORDER — LEVOTHYROXINE SODIUM 112 UG/1
TABLET ORAL
Qty: 90 TAB | Refills: 1 | Status: SHIPPED | OUTPATIENT
Start: 2018-03-08 | End: 2018-10-31 | Stop reason: SDUPTHER

## 2018-03-08 NOTE — MR AVS SNAPSHOT
[address] [address] [facility] 
[phone] Patient: Fitz Brooks MRN: [id number] EYU:[birth year] Visit Information Date & Time Provider Department Dept. Phone Encounter #  
 3/8/2018 11:30 AM MD Negro Gregory 380 [phone] Follow-up Instructions Return if symptoms worsen or fail to improve. Your Appointments 3/22/2018  9:45 AM  
PHYSICAL PRE OP with MD Negro Gregory 380 ([facility] Appt Note: mwv $0 cp lsh 1/17/18; mwv $0 cp smc 3/18/18  
 [address] [place] [address]. [address] [place] [address]  
[phone]  
  
   
 [address] [place] [address] [address] [facility]  
  
    
 5/31/2018 11:30 AM  
6 MONTH with Mandy Jack MD  
[facility] Appt Note: $0CP 10/5/17ksr [address] [facility] [address]  
[phone] [address] [facility] [address] Upcoming Health Maintenance Date Due  
 GLAUCOMA SCREENING Q2Y 12/18/2007 COLONOSCOPY 7/27/2015 DTaP/Tdap/Td series (2 - Td) 9/27/2027 Allergies as of 3/8/2018  Review Complete On: 3/8/2018 By: Radha De La O MD  
  
 Severity Noted Reaction Type Reactions Lisinopril  06/08/2015    Cough Current Immunizations  Reviewed on 9/21/2017 Name Date Influenza High Dose Vaccine PF 9/21/2017 Influenza Vaccine Andrea Hinders) 10/23/2014 Influenza Vaccine (Quad) PF 9/13/2016, 11/13/2015 Pneumococcal Conjugate (PCV-13) 12/31/2015 Pneumococcal Polysaccharide (PPSV-23) 9/13/2016 Not reviewed this visit You Were Diagnosed With   
  
 Codes Comments Knee strain, left, initial encounter    -  Primary ICD-10-CM: P65.434Z ICD-9-CM: 844.9 Orthostatic dizziness     ICD-10-CM: P67 ICD-9-CM: 780.4 Paroxysmal atrial fibrillation (HCC)     ICD-10-CM: I48.0 ICD-9-CM: 427.31 Hypothyroidism, unspecified type     ICD-10-CM: E03.9 ICD-9-CM: 244.9 Essential hypertension     ICD-10-CM: I10 
ICD-9-CM: 401.9 Vitals BP Pulse Temp Resp Height(growth percentile) Weight(growth percentile) 130/84 (BP 1 Location: Left arm, BP Patient Position: At rest) (!) 53 96.1 °F (35.6 °C) (Oral) 20 5' 3\" (1.6 m) 225 lb (102.1 kg) SpO2 BMI OB Status Smoking Status 97% 39.86 kg/m2 Ablation Never Smoker Vitals History BMI and BSA Data Body Mass Index Body Surface Area  
 39.86 kg/m 2 2.13 m 2 Preferred Pharmacy Pharmacy Name Phone [facility] [address] [place] Jayant Solano [address] [facility] [phone] Your Updated Medication List  
  
   
This list is accurate as of 3/8/18 12:27 PM.  Always use your most recent med list.  
  
  
  
  
 apixaban 5 mg tablet Commonly known as:  Suman Reynaldo Take 1 Tab by mouth two (2) times a day. CONTRAVE 8-90 mg Tber ER tablet Generic drug:  naltrexone-buPROPion TAKE 2 TABLETS BY MOUTH BEFORE BREAKFAST AND AT BEDTIME. levothyroxine 112 mcg tablet Commonly known as:  SYNTHROID  
TAKE ONE TABLET BY MOUTH ONCE DAILY BEFORE BREAKFAST  
  
 metoprolol tartrate 25 mg tablet Commonly known as:  LOPRESSOR Take 0.5 Tabs by mouth two (2) times a day. MILK THISTLE PO Take  by mouth.  
  
 multivitamin tablet Commonly known as:  ONE A DAY Take 1 Tab by mouth daily. Prescriptions Sent to Pharmacy Refills  
 apixaban (ELIQUIS) 5 mg tablet 5 Sig: Take 1 Tab by mouth two (2) times a day. Class: Normal  
 Pharmacy: [address] [place] [address] Ph #: [phone] Route: Oral  
 levothyroxine (SYNTHROID) 112 mcg tablet 1 Sig: TAKE ONE TABLET BY MOUTH ONCE DAILY BEFORE BREAKFAST Class: Normal  
 Pharmacy: [address] [place] [address] Ph #: [phone] We Performed the Following AMB SUPPLY ORDER [[id number] Custom] Comments:  
 Left knee brace hinged METABOLIC PANEL, BASIC [28395 CPT(R)] TSH 3RD GENERATION [09825 CPT(R)] Follow-up Instructions Return if symptoms worsen or fail to improve. Introducing [facility]! [place] [facility] introduces [employer] patient portal. Now you can access parts of your medical record, email your doctor's office, and request medication refills online. 1. In your internet browser, go to [email or web address] 2. Click on the First Time User? Click Here link in the Sign In box. You will see the New Member Sign Up page. 3. Enter your [employer] Access Code exactly as it appears below. You will not need to use this code after youve completed the sign-up process. If you do not sign up before the expiration date, you must request a new code. · [employer] Access Code: [email or web address] Expires: 4/17/2018 10:30 AM 
 
4. Enter the last four digits of your Social Security Number (xxxx) and Date of Birth (mm/dd/yyyy) as indicated and click Submit. You will be taken to the next sign-up page. 5. Create a [employer] ID. This will be your [employer] login ID and cannot be changed, so think of one that is secure and easy to remember. 6. Create a [employer] password. You can change your password at any time. 7. Enter your Password Reset Question and Answer. This can be used at a later time if you forget your password. 8. Enter your e-mail address. You will receive e-mail notification when new information is available in [address] 9. Click Sign Up. You can now view and download portions of your medical record. 10. Click the Download Summary menu link to download a portable copy of your medical information. If you have questions, please visit the Frequently Asked Questions section of the [employer] website. Remember, [employer] is NOT to be used for urgent needs. For medical emergencies, dial 911. Now available from your iPhone and Android! Please provide this summary of care documentation to your next provider. Your primary care clinician is listed as Sharyle Fujisawa. If you have any questions after today's visit, please call [phone].

## 2018-03-08 NOTE — PROGRESS NOTES
Re injured left knee - now with pain  Kenny Martins, NATHANAEL  3/2/4247  25:81 AM  Orthostatics:  Sitting left arm manual 110/80, pulse 42                         Lying left arm manual 120/78 , pulse 45                        Standing left arm manual 100/68 , 56

## 2018-03-08 NOTE — PROGRESS NOTES
HISTORY OF PRESENT ILLNESS  Mariela Jarrett is a 78 y.o. female. Knee Pain   The history is provided by the patient. This is a new problem. Episode onset: 2.5 weeks ago. The problem occurs hourly. The problem has been gradually improving. Associated symptoms comments: Left knee pain. The symptoms are aggravated by bending, twisting and walking. The symptoms are relieved by rest and ice. Treatments tried: aleve. The treatment provided mild relief. Dizziness    This is a recurrent problem. Episode onset: months. There was no loss of consciousness. The problem is associated with standing up. Associated symptoms include dizziness. Pertinent negatives include no focal weakness. Luis E Basil into a [employer] and also yard work. Supposed to go on a trip to the [place] to bring back a boat that she can even walk too well at this point. Thinks some of her dizziness is related to her medications because that is when this seemed to have started. Reports taking her blood thinner. No bleeding. Takes her thyroid medications. Patient Active Problem List   Diagnosis Code    Abnormal heart sounds R01.2    Generalized OA M15.9    Hypothyroidism E03.9    HTN (hypertension) I10    Paroxysmal atrial fibrillation (HCC) I48.0     Social History     Social History    Marital status:      Spouse name: N/A    Number of children: N/A    Years of education: N/A     Occupational History    Not on file. Social History Main Topics    Smoking status: Never Smoker    Smokeless tobacco: Never Used    Alcohol use Yes      Comment: 2-3 glasses of wine/daily    Drug use: No    Sexual activity: Yes     Partners: Male     Birth control/ protection: None     Other Topics Concern    Not on file     Social History Narrative    sailing         Review of Systems   Gastrointestinal: Negative for blood in stool. Musculoskeletal: Positive for joint pain. Negative for falls. Neurological: Positive for dizziness.  Negative for focal weakness. Physical Exam  Visit Vitals    /84 (BP 1 Location: Left arm, BP Patient Position: At rest)    Pulse (!) 53    Temp 96.1 °F (35.6 °C) (Oral)    Resp 20    Ht 5' 3\" (1.6 m)    Wt 225 lb (102.1 kg)    SpO2 97%    BMI 39.86 kg/m2     WD WN female NAD, orthostatics done by my nurse see her note. Heart RRR without murmers clicks or rubs  Lungs CTA  Abdo soft nontender  Ext no edema, left knee is tender to testing with range of motion guards a little hard to get a good test seems to have a positive Rashel. ASSESSMENT and PLAN  Encounter Diagnoses   Name Primary? 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 OTC Aleve, if no better with follow-up probably should see orthopedics consider meniscal tear. Orthostatic dizziness, stop amlodipine. May reduce her beta-blocker but probably need something for rate control of her atrial fibrillation. She will continue her anticoagulant. Hypothyroidism stable, check TSH. Follow-up Disposition:  Return if symptoms worsen or fail to improve.   And as scheduled

## 2018-03-09 LAB
BUN SERPL-MCNC: 14 MG/DL (ref 8–27)
BUN/CREAT SERPL: 18 (ref 12–28)
CALCIUM SERPL-MCNC: 9.7 MG/DL (ref 8.7–10.3)
CHLORIDE SERPL-SCNC: 102 MMOL/L (ref 96–106)
CO2 SERPL-SCNC: 23 MMOL/L (ref 18–29)
CREAT SERPL-MCNC: 0.79 MG/DL (ref 0.57–1)
GFR SERPLBLD CREATININE-BSD FMLA CKD-EPI: 73 ML/MIN/1.73
GFR SERPLBLD CREATININE-BSD FMLA CKD-EPI: 85 ML/MIN/1.73
GLUCOSE SERPL-MCNC: 85 MG/DL (ref 65–99)
POTASSIUM SERPL-SCNC: 5.4 MMOL/L (ref 3.5–5.2)
SODIUM SERPL-SCNC: 144 MMOL/L (ref 134–144)
TSH SERPL DL<=0.005 MIU/L-ACNC: 2.81 UIU/ML (ref 0.45–4.5)

## 2018-03-22 ENCOUNTER — OFFICE VISIT (OUTPATIENT)
Dept: INTERNAL MEDICINE CLINIC | Age: 78
End: 2018-03-22

## 2018-03-22 VITALS
RESPIRATION RATE: 16 BRPM | BODY MASS INDEX: 39.51 KG/M2 | DIASTOLIC BLOOD PRESSURE: 70 MMHG | SYSTOLIC BLOOD PRESSURE: 130 MMHG | HEIGHT: 63 IN | OXYGEN SATURATION: 98 % | TEMPERATURE: 96.4 F | WEIGHT: 223 LBS | HEART RATE: 44 BPM

## 2018-03-22 DIAGNOSIS — Z13.39 SCREENING FOR ALCOHOLISM: ICD-10-CM

## 2018-03-22 DIAGNOSIS — S86.912S KNEE STRAIN, LEFT, SEQUELA: ICD-10-CM

## 2018-03-22 DIAGNOSIS — Z12.11 SCREENING FOR COLON CANCER: ICD-10-CM

## 2018-03-22 DIAGNOSIS — E03.9 HYPOTHYROIDISM, UNSPECIFIED TYPE: ICD-10-CM

## 2018-03-22 DIAGNOSIS — R42 DIZZINESS: ICD-10-CM

## 2018-03-22 DIAGNOSIS — Z00.00 MEDICARE ANNUAL WELLNESS VISIT, SUBSEQUENT: Primary | ICD-10-CM

## 2018-03-22 DIAGNOSIS — R74.8 ELEVATED LIVER ENZYMES: ICD-10-CM

## 2018-03-22 DIAGNOSIS — Z13.220 SCREENING CHOLESTEROL LEVEL: ICD-10-CM

## 2018-03-22 DIAGNOSIS — I48.0 PAROXYSMAL ATRIAL FIBRILLATION (HCC): ICD-10-CM

## 2018-03-22 DIAGNOSIS — I10 ESSENTIAL HYPERTENSION: ICD-10-CM

## 2018-03-22 DIAGNOSIS — Z13.31 SCREENING FOR DEPRESSION: ICD-10-CM

## 2018-03-22 PROBLEM — E66.9 OBESITY (BMI 30-39.9): Status: ACTIVE | Noted: 2018-03-22

## 2018-03-22 RX ORDER — METOPROLOL TARTRATE 25 MG/1
12.5 TABLET, FILM COATED ORAL 2 TIMES DAILY
Qty: 90 TAB | Refills: 3 | Status: SHIPPED | OUTPATIENT
Start: 2018-03-22 | End: 2018-11-02 | Stop reason: SDUPTHER

## 2018-03-22 RX ORDER — PRENATAL VIT CALC,IRON,FOLIC
1 TABLET ORAL 2 TIMES DAILY
Qty: 100 TAB | Refills: 5 | Status: SHIPPED | OUTPATIENT
Start: 2018-03-22

## 2018-03-22 NOTE — PROGRESS NOTES
This is the Subsequent Medicare Annual Wellness Exam, performed 12 months or more after the Initial AWV or the last Subsequent AWV    I have reviewed the patient's medical history in detail and updated the computerized patient record. History     Dizziness cont daily x 6 months or more. Stopping amlodipine didn't help much. Been on contrave x 1 year. Has had minimal weight loss. It does suppress her appetite somewhat. Some tremor noted lately. Knee getting better gradually. Injured over a month ago. Routine blood work showed an elevated potassium level. Reports taking blood pressure medications without side affects. No complaints of exertional chest pain, excessive shortness of breath or focal weakness. Minimal swelling in lower legs or dizziness with sitting to standing. Past Medical History:   Diagnosis Date    Arthritis     GERD (gastroesophageal reflux disease)     Hypercholesterolemia     Hypertension     Hypothyroid 2004      Past Surgical History:   Procedure Laterality Date    HX APPENDECTOMY  1960    HX TUBAL LIGATION  1969     Current Outpatient Prescriptions   Medication Sig Dispense Refill    TURMERIC, BULK, by Does Not Apply route.  apixaban (ELIQUIS) 5 mg tablet Take 1 Tab by mouth two (2) times a day. 60 Tab 5    levothyroxine (SYNTHROID) 112 mcg tablet TAKE ONE TABLET BY MOUTH ONCE DAILY BEFORE BREAKFAST 90 Tab 1    CONTRAVE 8-90 mg TbER ER tablet TAKE 2 TABLETS BY MOUTH BEFORE BREAKFAST AND AT BEDTIME. 120 Tab 1    metoprolol tartrate (LOPRESSOR) 25 mg tablet Take 0.5 Tabs by mouth two (2) times a day. 180 Tab 0    MILK THISTLE PO Take  by mouth.  multivitamin (ONE A DAY) tablet Take 1 Tab by mouth daily.        Allergies   Allergen Reactions    Lisinopril Cough     Family History   Problem Relation Age of Onset    Heart Disease Father 79    Hypertension Father     Stroke Father     Psychiatric Disorder Sister     Cancer Brother     Psychiatric Disorder Brother      Social History   Substance Use Topics    Smoking status: Never Smoker    Smokeless tobacco: Never Used    Alcohol use Yes      Comment: 2-3 glasses of wine/daily     Patient Active Problem List   Diagnosis Code    Abnormal heart sounds R01.2    Generalized OA M15.9    Hypothyroidism E03.9    HTN (hypertension) I10    Paroxysmal atrial fibrillation (HCC) I48.0       Depression Risk Factor Screening:     PHQ over the last two weeks 3/22/2018   Little interest or pleasure in doing things Not at all   Feeling down, depressed or hopeless Not at all   Total Score PHQ 2 0     Alcohol Risk Factor Screening: You average more than 7 drinks a week. She's going to quit. More than several drinks a day. Functional Ability and Level of Safety:   Hearing Loss  Hearing is declining. Dec ENT referral.    Activities of Daily Living  The home contains: grab bars  Patient does total self care    Fall Risk  Fall Risk Assessment, last 12 mths 3/22/2018   Able to walk? Yes   Fall in past 12 months? No   Fall with injury? -   Number of falls in past 12 months -   Fall Risk Score -       Abuse Screen  Patient is not abused    Cognitive Screening   Evaluation of Cognitive Function:  Has your family/caregiver stated any concerns about your memory: no  Normal    Patient Care Team   Patient Care Team:  Mark Wooten MD as PCP - General (Family Practice)  Giovanna Maurer MD (Cardiology)  Rebeka Del Valle MD as Physician (Sleep Medicine)    Assessment/Plan   Education and counseling provided:  Colorectal cancer screening tests  Screening for glaucoma    We discussed a screening exam colon CA and the  the pros and cons of having the test done. The patient made a decision to do the test: yes OK FIT, she'll schedule opto check up. ICD-10-CM ICD-9-CM    1. Medicare annual wellness visit, subsequent Z00.00 V70.0    2.  Essential hypertension I10 401.9 POTASSIUM   3. Screening for alcoholism Z13.89 V79.1 CT ANNUAL ALCOHOL SCREEN 15 MIN   4. Screening for depression Z13.89 V79.0 DEPRESSION SCREEN ANNUAL   5. Elevated liver enzymes R74.8 790.5 HEPATIC FUNCTION PANEL   6. Dizziness R42 780.4    7. Hypothyroidism, unspecified type E03.9 244.9    8. Paroxysmal atrial fibrillation (HCC) I48.0 427.31 apixaban (ELIQUIS) 5 mg tablet   9. Screening for colon cancer Z12.11 V76.51 OCCULT BLOOD, IMMUNOASSAY (FIT)   10. Screening cholesterol level Z13.220 V77.91 LIPID PANEL     Orders Placed This Encounter    Depression Screen Annual    POTASSIUM    HEPATIC FUNCTION PANEL    OCCULT BLOOD, IMMUNOASSAY (FIT)    LIPID PANEL    Annual  Alcohol Screen 15 min ()    TURMERIC, BULK,     Sig: by Does Not Apply route.  metoprolol tartrate (LOPRESSOR) 25 mg tablet     Sig: Take 0.5 Tabs by mouth two (2) times a day. Dispense:  90 Tab     Refill:  3    apixaban (ELIQUIS) 5 mg tablet     Sig: Take 1 Tab by mouth two (2) times a day. Dispense:  180 Tab     Refill:  3    Calcium Citrate-Vitamin D3 (CITRACAL + D PETITES) tablet     Sig: Take 1 Tab by mouth two (2) times a day. Dispense:  100 Tab     Refill:  5    Could Contrave be giving her some of the above symptoms? Due to the liver enzymes and because of her obesity have asked her to reduce her alcohol usage. See patient instructions, went over them personally with the patient. Emphasized compliance. Questions answered. Patient states that they understand the plan of action and will call if there are any issues or misunderstandings. Current Outpatient Prescriptions   Medication Sig Dispense Refill    TURMERIC, BULK, by Does Not Apply route.  metoprolol tartrate (LOPRESSOR) 25 mg tablet Take 0.5 Tabs by mouth two (2) times a day. 90 Tab 3    apixaban (ELIQUIS) 5 mg tablet Take 1 Tab by mouth two (2) times a day. 180 Tab 3    Calcium Citrate-Vitamin D3 (CITRACAL + D PETITES) tablet Take 1 Tab by mouth two (2) times a day.  100 Tab 5    levothyroxine (SYNTHROID) 112 mcg tablet TAKE ONE TABLET BY MOUTH ONCE DAILY BEFORE BREAKFAST 90 Tab 1    MILK THISTLE PO Take  by mouth.  multivitamin (ONE A DAY) tablet Take 1 Tab by mouth daily. Health Maintenance Due   Topic Date Due    GLAUCOMA SCREENING Q2Y  12/18/2007    COLONOSCOPY  07/27/2015    MEDICARE YEARLY EXAM  03/14/2018     Chronic Conditions Addressed Today     1. Paroxysmal atrial fibrillation (HCC)     Overview      Several short runs on Holter monitor March 2017          Relevant Medications     metoprolol tartrate (LOPRESSOR) 25 mg tablet     apixaban (ELIQUIS) 5 mg tablet    2. Hypothyroidism     Relevant Medications     Calcium Citrate-Vitamin D3 (CITRACAL + D PETITES) tablet    3. HTN (hypertension)     Relevant Medications     metoprolol tartrate (LOPRESSOR) 25 mg tablet     apixaban (ELIQUIS) 5 mg tablet     Other Relevant Orders     POTASSIUM      Acute Diagnoses Addressed Today     Medicare annual wellness visit, subsequent    -  Primary    Screening for alcoholism            Relevant Orders        [place] ANNUAL ALCOHOL SCREEN 15 MIN    Screening for depression            Relevant Orders        DEPRESSION SCREEN ANNUAL    Elevated liver enzymes            Relevant Orders        HEPATIC FUNCTION PANEL    Dizziness            Relevant Medications        apixaban (ELIQUIS) 5 mg tablet    Screening for colon cancer            Relevant Orders        OCCULT BLOOD, IMMUNOASSAY (FIT)    Screening cholesterol level            Relevant Orders        LIPID PANEL    Knee strain, left, sequela            Follow-up Disposition:  Return in about 4 months (around 7/22/2018) for routine follow up.

## 2018-03-22 NOTE — LETTER
3/29/2018 2:15 PM 
 
[phone] Dear Sylwia Patino: Your results are normal/stable. If not signed up, consider getting my chart to get your results on-line. We can help you to sign up. Potassium normal  
Liver enzymes still slightly elevated, please reduce/stop alcohol.  
    
   
 
 
 
Please find your most recent results below. Resulted Orders METABOLIC PANEL, BASIC Result Value Ref Range Glucose 85 65 - 99 mg/dL Comment:  
   Specimen received in contact with cells. No visible hemolysis 
present. However GLUC may be decreased and K increased. Clinical 
correlation indicated. BUN 14 8 - 27 mg/dL Creatinine 0.79 0.57 - 1.00 mg/dL GFR est non-AA 73 >59 mL/min/1.73 GFR est AA 85 >59 mL/min/1.73  
 BUN/Creatinine ratio 18 12 - 28 Sodium 144 134 - 144 mmol/L Potassium 5.4 (H) 3.5 - 5.2 mmol/L Comment:  
   Specimen received in contact with cells. No visible hemolysis 
present. However GLUC may be decreased and K increased. Clinical 
correlation indicated. Chloride 102 96 - 106 mmol/L  
 CO2 23 18 - 29 mmol/L Calcium 9.7 8.7 - 10.3 mg/dL Narrative Performed at:  [address]  [id number] : Cameron Nuñez MD, Phone:  [phone] TSH 3RD GENERATION Result Value Ref Range TSH 2.810 0.450 - 4.500 uIU/mL Narrative Performed at:  [address]  [id number] : Cameron Nuñez MD, Phone:  [phone] POTASSIUM Result Value Ref Range Potassium 4.6 3.5 - 5.2 mmol/L Comment:  
   Specimen received in contact with cells. No visible hemolysis 
present. However GLUC may be decreased and K increased. Clinical 
correlation indicated. Narrative Performed at:  [address]  [id number] : Alesha Christine MD, Phone:  [phone] HEPATIC FUNCTION PANEL Result Value Ref Range Protein, total 6.6 6.0 - 8.5 g/dL Albumin 4.1 3.5 - 4.8 g/dL Bilirubin, total 0.7 0.0 - 1.2 mg/dL Bilirubin, direct 0.20 0.00 - 0.40 mg/dL Alk. phosphatase 79 39 - 117 IU/L  
 AST (SGOT) 35 0 - 40 IU/L  
 ALT (SGPT) 37 (H) 0 - 32 IU/L Narrative Performed at:  [address]  [id number] : Alesha Christine MD, Phone:  [phone] Please call me if you have any questions: [phone] Sincerely, Melo Garcia MD

## 2018-03-22 NOTE — MR AVS SNAPSHOT
Neelam Waldropam 
 
 
 [address] [place] [address]. [address] [address] [facility] 
[phone] Patient: Lauren Cherry MRN: [id number] [place] Visit Information Date & Time Provider Department Dept. Phone Encounter #  
 3/22/2018  9:45 AM MD Obdulio Candelario Dr [id number] Follow-up Instructions Return in about 4 months (around 2018) for routine follow up. Your Appointments 2018 11:30 AM  
6 MONTH with Blue Marsh MD  
[facility] [address] [place] [address]) Appt Note: $0CP 10/5/17ksr [id number] [facility]  
[phone] [address] [facility] Upcoming Health Maintenance Date Due FOBT Q 1 YEAR, 18+ 1960 GLAUCOMA SCREENING Q2Y 2007 COLONOSCOPY 2015 MEDICARE YEARLY EXAM 3/23/2019 DTaP/Tdap/Td series (2 - Td) 2027 Allergies as of 3/22/2018  Review Complete On: 3/22/2018 By: Chet Penny MD  
  
 Severity Noted Reaction Type Reactions Lisinopril  2015    Cough Current Immunizations  Reviewed on 2017 Name Date Influenza High Dose Vaccine PF 2017 Influenza Vaccine Nata Seller) 10/23/2014 Influenza Vaccine (Quad) PF 2016, 2015 Pneumococcal Conjugate (PCV-13) 2015 Pneumococcal Polysaccharide (PPSV-23) 2016 Not reviewed this visit You Were Diagnosed With   
  
 Codes Comments Medicare annual wellness visit, subsequent    -  Primary ICD-10-CM: Z00.00 ICD-9-CM: V70.0 Essential hypertension     ICD-10-CM: I10 
ICD-9-CM: 401.9 Screening for alcoholism     ICD-10-CM: Z13.89 ICD-9-CM: V79.1 Screening for depression     ICD-10-CM: Z13.89 ICD-9-CM: V79.0 Elevated liver enzymes     ICD-10-CM: R74.8 ICD-9-CM: 790.5 Dizziness     ICD-10-CM: K30 ICD-9-CM: 780.4 Hypothyroidism, unspecified type     ICD-10-CM: E03.9 ICD-9-CM: 244.9 Paroxysmal atrial fibrillation (HCC)     ICD-10-CM: I48.0 ICD-9-CM: 427.31 Screening for colon cancer     ICD-10-CM: Z12.11 ICD-9-CM: V76.51 Screening cholesterol level     ICD-10-CM: C65.738 ICD-9-CM: V77.91 Knee strain, left, sequela     ICD-10-CM: J14.028X ICD-9-CM: 008. 7 Vitals BP Pulse Temp Resp Height(growth percentile) Weight(growth percentile) 130/70 (BP 1 Location: Left arm, BP Patient Position: At rest) (!) 44 96.4 °F (35.8 °C) (Oral) 16 5' 3\" (1.6 m) 223 lb (101.2 kg) SpO2 BMI OB Status Smoking Status 98% 39.5 kg/m2 Ablation Never Smoker BMI and BSA Data Body Mass Index Body Surface Area  
 39.5 kg/m 2 2.12 m 2 Preferred Pharmacy Pharmacy Name Phone [facility] [address] [facility], [phone] [facility] [phone] Your Updated Medication List  
  
   
This list is accurate as of 3/22/18 10:39 AM.  Always use your most recent med list.  
  
  
  
  
 apixaban 5 mg tablet Commonly known as:  Mahala [place] Take 1 Tab by mouth two (2) times a day. Calcium Citrate-Vitamin D3 tablet Commonly known as:  CITRACAL + D PETITES Take 1 Tab by mouth two (2) times a day. levothyroxine 112 mcg tablet Commonly known as:  SYNTHROID  
TAKE ONE TABLET BY MOUTH ONCE DAILY BEFORE BREAKFAST  
  
 metoprolol tartrate 25 mg tablet Commonly known as:  LOPRESSOR Take 0.5 Tabs by mouth two (2) times a day. MILK THISTLE PO Take  by mouth.  
  
 multivitamin tablet Commonly known as:  ONE A DAY Take 1 Tab by mouth daily. TURMERIC (BULK)  
by Does Not Apply route. Prescriptions Sent to Pharmacy Refills  
 metoprolol tartrate (LOPRESSOR) 25 mg tablet 3 Sig: Take 0.5 Tabs by mouth two (2) times a day.   
 Class: Normal  
 Pharmacy: [address] [facility],  [employer] [facility] Ph #: [phone] Route: Oral  
 apixaban (ELIQUIS) 5 mg tablet 3 Sig: Take 1 Tab by mouth two (2) times a day. Class: Normal  
 Pharmacy: [address] [facility], [address] Ph #: [phone] Route: Oral  
 Calcium Citrate-Vitamin D3 (CITRACAL + D PETITES) tablet 5 Sig: Take 1 Tab by mouth two (2) times a day. Class: Normal  
 Pharmacy: [address] [facility], [address] Ph #: [phone] Route: Oral  
  
We Performed the Following [address] [[id number] HCPCS] HEPATIC FUNCTION PANEL [90659 CPT(R)] LIPID PANEL [27829 CPT(R)] OCCULT BLOOD, IMMUNOASSAY (FIT) C5624795 CPT(R)] POTASSIUM [id number] CPT(R)] [place] ANNUAL ALCOHOL SCREEN 15 MIN [id number] [facility]] Follow-up Instructions Return in about 4 months (around 7/22/2018) for routine follow up. Patient Instructions Medicare Wellness Visit, Female The best way to live healthy is to have a healthy lifestyle by eating a well-balanced diet, exercising regularly, limiting alcohol and stopping smoking. Regular physical exams and screening tests are another way to keep healthy. Preventive exams provided by your health care provider can find health problems before they become diseases or illnesses. Preventive services including immunizations, screening tests, monitoring and exams can help you take care of your own health. All people over age 72 should have a pneumovax  and and a prevnar shot to prevent pneumonia. These are once in a lifetime unless you and your provider decide differently. All people over 65 should have a yearly flu shot and a tetanus vaccine every 10 years. A bone mass density to screen for osteoporosis or thinning of the bones should be done every 2 years after 65. Screening for diabetes mellitus with a blood sugar test should be done every year. Glaucoma is a disease of the eye due to increased ocular pressure that can lead to blindness and it should be done every year by an eye professional. 
 
Cardiovascular screening tests that check for elevated lipids (fatty part of blood) which can lead to heart disease and strokes should be done every 5 years. Colorectal screening that evaluates for blood or polyps in your colon should be done yearly as a stool test or every five years as a flexible sigmoidoscope or every 10 years as a colonoscopy up to age 76. Breast cancer screening with a mammogram is recommended biennially  for women age 54-69. Screening for cervical cancer with a pap smear and pelvic exam is recommended for women after age 72 years every 2 years up to age 79 or when the provider and patient decide to stop. If there is a history of cervical abnormalities or other increased risk for cancer then the test is recommended yearly. Hepatitis C screening is also recommended for anyone born between 80 through [address]. A shingles vaccine is also recommended once in a lifetime after age 61. Your Medicare Wellness Exam is recommended annually. Here is a list of your current Health Maintenance items with a due date: 
Health Maintenance Due Topic Date Due  Glaucoma Screening   12/18/2007  Colonoscopy  07/27/2015 [address] [place] [address] Annual Well Visit  03/14/2018 To see if the medications is causing a side affect, consider a drug holiday. Stop the medications for several weeks. If the side affect goes away you it's likely the medication. Can re-start the medication, If symptoms re-occur than it's the medication that is causing the problem. Wean contrave to see if causing dizziness. Shingrix is a new vaccine to prevent shingles. It's better than the old shingles vaccine. PLease reduce or preferably eliminate alcohol inc wine to see if liver enzymes normalize. Introducing [facility] SERVICES! Enzo Melissa introduces [employer] patient portal. Now you can access parts of your medical record, email your doctor's office, and request medication refills online. 1. In your internet browser, go to [email or web address] 2. Click on the First Time User? Click Here link in the Sign In box. You will see the New Member Sign Up page. 3. Enter your [employer] Access Code exactly as it appears below. You will not need to use this code after youve completed the sign-up process. If you do not sign up before the expiration date, you must request a new code. · [employer] Access Code: [email or web address] Expires: 4/17/2018 11:30 AM 
 
4. Enter the last four digits of your Social Security Number (xxxx) and Date of Birth (mm/dd/yyyy) as indicated and click Submit. You will be taken to the next sign-up page. 5. Create a Weddingful ID. This will be your [employer] login ID and cannot be changed, so think of one that is secure and easy to remember. 6. Create a [employer] password. You can change your password at any time. 7. Enter your Password Reset Question and Answer. This can be used at a later time if you forget your password. 8. Enter your e-mail address. You will receive e-mail notification when new information is available in [address] 9. Click Sign Up. You can now view and download portions of your medical record. 10. Click the Download Summary menu link to download a portable copy of your medical information. If you have questions, please visit the Frequently Asked Questions section of the [employer] website. Remember, [employer] is NOT to be used for urgent needs. For medical emergencies, dial 911. Now available from your iPhone and Android! Please provide this summary of care documentation to your next provider. Your primary care clinician is listed as Leila Mauricio. If you have any questions after today's visit, please call [phone].

## 2018-03-22 NOTE — PATIENT INSTRUCTIONS
Medicare Wellness Visit, Female    The best way to live healthy is to have a healthy lifestyle by eating a well-balanced diet, exercising regularly, limiting alcohol and stopping smoking. Regular physical exams and screening tests are another way to keep healthy. Preventive exams provided by your health care provider can find health problems before they become diseases or illnesses. Preventive services including immunizations, screening tests, monitoring and exams can help you take care of your own health. All people over age 72 should have a pneumovax  and and a prevnar shot to prevent pneumonia. These are once in a lifetime unless you and your provider decide differently. All people over 65 should have a yearly flu shot and a tetanus vaccine every 10 years. A bone mass density to screen for osteoporosis or thinning of the bones should be done every 2 years after 65. Screening for diabetes mellitus with a blood sugar test should be done every year. Glaucoma is a disease of the eye due to increased ocular pressure that can lead to blindness and it should be done every year by an eye professional.    Cardiovascular screening tests that check for elevated lipids (fatty part of blood) which can lead to heart disease and strokes should be done every 5 years. Colorectal screening that evaluates for blood or polyps in your colon should be done yearly as a stool test or every five years as a flexible sigmoidoscope or every 10 years as a colonoscopy up to age 76. Breast cancer screening with a mammogram is recommended biennially  for women age 54-69. Screening for cervical cancer with a pap smear and pelvic exam is recommended for women after age 72 years every 2 years up to age 79 or when the provider and patient decide to stop. If there is a history of cervical abnormalities or other increased risk for cancer then the test is recommended yearly.     Hepatitis C screening is also recommended for anyone born between [birth year] through [address]. A shingles vaccine is also recommended once in a lifetime after age 61. Your Medicare Wellness Exam is recommended annually. Here is a list of your current Health Maintenance items with a due date:  Health Maintenance Due   Topic Date Due    Glaucoma Screening   12/18/2007    Colonoscopy  07/27/2015    Annual Well Visit  03/14/2018     To see if the medications is causing a side affect, consider a drug holiday. Stop the medications for several weeks. If the side affect goes away you it's likely the medication. Can re-start the medication, If symptoms re-occur than it's the medication that is causing the problem. Wean contrave to see if causing dizziness. Shingrix is a new vaccine to prevent shingles. It's better than the old shingles vaccine. PLease reduce or preferably eliminate alcohol inc wine to see if liver enzymes normalize.

## 2018-03-22 NOTE — LETTER
3/22/2018 9:28 AM 
 
Ms[id number] [phone] Dear Nemesio Fritz: 
 
Please find your most recent results below. Resulted Orders METABOLIC PANEL, BASIC Result Value Ref Range Glucose 85 65 - 99 mg/dL Comment:  
   Specimen received in contact with cells. No visible hemolysis 
present. However GLUC may be decreased and K increased. Clinical 
correlation indicated. BUN 14 8 - 27 mg/dL Creatinine 0.79 0.57 - 1.00 mg/dL GFR est non-AA 73 >59 mL/min/1.73 GFR est AA 85 >59 mL/min/1.73  
 BUN/Creatinine ratio 18 12 - 28 Sodium 144 134 - 144 mmol/L Potassium 5.4 (H) 3.5 - 5.2 mmol/L Comment:  
   Specimen received in contact with cells. No visible hemolysis 
present. However GLUC may be decreased and K increased. Clinical 
correlation indicated. Chloride 102 96 - 106 mmol/L  
 CO2 23 18 - 29 mmol/L Calcium 9.7 8.7 - 10.3 mg/dL Narrative Performed at:  [address]  [id number] : Rhiannon Jorge MD, Phone:  [phone] TSH 3RD GENERATION Result Value Ref Range TSH 2.810 0.450 - 4.500 uIU/mL Narrative Performed at:  [address]  [id number] : Rhiannon Jorge MD, Phone:  [phone] RECOMMENDATIONS: 
None. Keep up the good work! Please call me if you have any questions: [phone] Sincerely, Riya Gaytan MD

## 2018-03-22 NOTE — PROGRESS NOTES
Annual wellness visit - lats colonoscopy 7 years ago -   Fall Risk Assessment, last 12 mths 1/17/2018   Able to walk? Yes   Fall in past 12 months? Yes   Fall with injury? Yes   Number of falls in past 12 months 6   Fall Risk Score 7       PHQ over the last two weeks 1/17/2018   Little interest or pleasure in doing things Several days   Feeling down, depressed or hopeless Several days   Total Score PHQ 2 2       Abuse Screening Questionnaire 1/17/2018   Do you ever feel afraid of your partner? N   Are you in a relationship with someone who physically or mentally threatens you? N   Is it safe for you to go home?  Y       ADL Assessment 1/17/2018   Feeding yourself No Help Needed   Getting from bed to chair No Help Needed   Getting dressed No Help Needed   Bathing or showering No Help Needed   Walk across the room (includes cane/walker) No Help Needed   Using the telphone No Help Needed   Taking your medications No Help Needed   Preparing meals No Help Needed   Managing money (expenses/bills) No Help Needed   Moderately strenuous housework (laundry) No Help Needed   Shopping for personal items (toiletries/medicines) No Help Needed   Shopping for groceries No Help Needed   Driving No Help Needed   Climbing a flight of stairs No Help Needed   Getting to places beyond walking distances No Help Needed

## 2018-03-23 LAB
ALBUMIN SERPL-MCNC: 4.1 G/DL (ref 3.5–4.8)
ALP SERPL-CCNC: 79 IU/L (ref 39–117)
ALT SERPL-CCNC: 37 IU/L (ref 0–32)
AST SERPL-CCNC: 35 IU/L (ref 0–40)
BILIRUB DIRECT SERPL-MCNC: 0.2 MG/DL (ref 0–0.4)
BILIRUB SERPL-MCNC: 0.7 MG/DL (ref 0–1.2)
POTASSIUM SERPL-SCNC: 4.6 MMOL/L (ref 3.5–5.2)
PROT SERPL-MCNC: 6.6 G/DL (ref 6–8.5)

## 2018-03-23 NOTE — PROGRESS NOTES
Send normal/stable results letter. Your results are normal/stable. If not signed up, consider getting my chart to get your results on-line. We can help you to sign up. Potassium normal  Liver enzymes still slightly elevated, please reduce/stop alcohol.

## 2018-04-12 LAB — HEMOCCULT STL QL IA: ABNORMAL

## 2018-04-13 ENCOUNTER — TELEPHONE (OUTPATIENT)
Dept: INTERNAL MEDICINE CLINIC | Age: 78
End: 2018-04-13

## 2018-04-13 DIAGNOSIS — K92.2 GASTROINTESTINAL HEMORRHAGE, UNSPECIFIED GASTROINTESTINAL HEMORRHAGE TYPE: Primary | ICD-10-CM

## 2018-04-13 NOTE — TELEPHONE ENCOUNTER
Spoke with dheeraj at Dr. Yemi Peters office. They will need pts office notes and labs faxed over to [phone].

## 2018-05-31 ENCOUNTER — OFFICE VISIT (OUTPATIENT)
Dept: CARDIOLOGY CLINIC | Age: 78
End: 2018-05-31

## 2018-05-31 VITALS
SYSTOLIC BLOOD PRESSURE: 138 MMHG | OXYGEN SATURATION: 96 % | DIASTOLIC BLOOD PRESSURE: 82 MMHG | HEIGHT: 63 IN | BODY MASS INDEX: 39.87 KG/M2 | HEART RATE: 60 BPM | WEIGHT: 225 LBS

## 2018-05-31 DIAGNOSIS — E66.9 OBESITY (BMI 30-39.9): ICD-10-CM

## 2018-05-31 DIAGNOSIS — I48.0 PAROXYSMAL ATRIAL FIBRILLATION (HCC): ICD-10-CM

## 2018-05-31 DIAGNOSIS — I10 ESSENTIAL HYPERTENSION: Primary | ICD-10-CM

## 2018-05-31 PROBLEM — E66.01 SEVERE OBESITY (BMI 35.0-39.9): Status: ACTIVE | Noted: 2018-05-31

## 2018-05-31 NOTE — PROGRESS NOTES
Chief Complaint   Patient presents with    Irregular Heart Beat     6 MO. F/U     1. Have you been to the ER, urgent care clinic since your last visit? Hospitalized since your last visit? NO    2. Have you seen or consulted any other health care providers outside of the [address] [place] [address] since your last visit? Include any pap smears or colon screening.  NO

## 2018-05-31 NOTE — PROGRESS NOTES
[address] [facility] [place] [address] [place] [address], [address] [facility]  [phone]     Subjective:      Nicol  is a 78 y.o. female is here for routine f/u. The patient denies chest pain/ shortness of breath, orthopnea, PND, LE edema, palpitations, syncope, or presyncope. Patient Active Problem List    Diagnosis Date Noted    Severe obesity (BMI 35.0-39.9) ([facility] [address] 05/31/2018    Obesity (BMI 30-39.9) 03/22/2018    Paroxysmal atrial fibrillation ([facility] [address] 03/20/2017    Hypothyroidism 09/10/2014    HTN (hypertension) 09/10/2014    Abnormal heart sounds 09/05/2014    Generalized OA 09/05/2014      Hannah Hendrickson MD  Past Medical History:   Diagnosis Date    Arthritis     GERD (gastroesophageal reflux disease)     Hypercholesterolemia     Hypertension     Hypothyroid 2004      Past Surgical History:   Procedure Laterality Date    HX APPENDECTOMY  1960    HX TUBAL LIGATION  1969     Allergies   Allergen Reactions    Lisinopril Cough      Family History   Problem Relation Age of Onset    Heart Disease Father 79    Hypertension Father     Stroke Father     Psychiatric Disorder Sister     Cancer Brother     Psychiatric Disorder Brother       Social History     Social History    Marital status:      Spouse name: N/A    Number of children: N/A    Years of education: N/A     Occupational History    Not on file. Social History Main Topics    Smoking status: Never Smoker    Smokeless tobacco: Never Used    Alcohol use Yes      Comment: 4 maybe more    Drug use: No    Sexual activity: Yes     Partners: Male     Birth control/ protection: None     Other Topics Concern    Not on file     Social History Narrative    sailing      Current Outpatient Prescriptions   Medication Sig    TURMERIC, BULK, by Does Not Apply route daily.  metoprolol tartrate (LOPRESSOR) 25 mg tablet Take 0.5 Tabs by mouth two (2) times a day.     apixaban (ELIQUIS) 5 mg tablet Take 1 Tab by mouth two (2) times a day.    Calcium Citrate-Vitamin D3 (CITRACAL + D PETITES) tablet Take 1 Tab by mouth two (2) times a day.  levothyroxine (SYNTHROID) 112 mcg tablet TAKE ONE TABLET BY MOUTH ONCE DAILY BEFORE BREAKFAST    MILK THISTLE PO Take  by mouth.  multivitamin (ONE A DAY) tablet Take 1 Tab by mouth daily. No current facility-administered medications for this visit. Review of Symptoms:  11 systems reviewed, negative other than as stated in the HPI    Physical ExamPhysical Exam:    Vitals:    05/31/18 1151 05/31/18 1201   BP: 138/88 138/82   Pulse: 60    SpO2: 96%    Weight: 225 lb (102.1 kg)    Height: 5' 3\" (1.6 m)      Body mass index is 39.86 kg/(m^2). General PE   Gen:  NAD  Mental Status - Alert. General Appearance - Not in acute distress. Chest and Lung Exam   Inspection: Accessory muscles - No use of accessory muscles in breathing. Auscultation:   Breath sounds: - Normal.   Cardiovascular   Inspection: Jugular vein - Bilateral - Inspection Normal.   Palpation/Percussion:   Apical Impulse: - Normal.   Auscultation: Rhythm - Regular. Heart Sounds - S1 WNL and S2 WNL. No S3 or S4. Murmurs & Other Heart Sounds: Auscultation of the heart reveals - No Murmurs. Peripheral Vascular   Upper Extremity: Inspection - Bilateral - No Cyanotic nailbeds or Digital clubbing. Lower Extremity:   Palpation: Edema - Bilateral - No edema. Abdomen:   Soft, non-tender, bowel sounds are active.   Neuro: A&O times 3, CN and motor grossly WNL    Labs:   Lab Results   Component Value Date/Time    Cholesterol, total 224 (H) 09/13/2016 12:12 PM    Cholesterol, total 173 07/17/2014 11:33 AM    HDL Cholesterol 51 09/13/2016 12:12 PM    HDL Cholesterol 49 07/17/2014 11:33 AM    LDL, calculated 120 (H) 09/13/2016 12:12 PM    LDL, calculated 84 07/17/2014 11:33 AM    Triglyceride 264 (H) 09/13/2016 12:12 PM    Triglyceride 202 (H) 07/17/2014 11:33 AM     No results found for: CPK, CPKX, CPX  Lab Results   Component Value Date/Time    Sodium 144 03/08/2018 12:18 PM    Potassium 4.6 03/22/2018 10:04 AM    Chloride 102 03/08/2018 12:18 PM    CO2 23 03/08/2018 12:18 PM    Glucose 85 03/08/2018 12:18 PM    BUN 14 03/08/2018 12:18 PM    Creatinine 0.79 03/08/2018 12:18 PM    BUN/Creatinine ratio 18 03/08/2018 12:18 PM    GFR est AA 85 03/08/2018 12:18 PM    GFR est non-AA 73 03/08/2018 12:18 PM    Calcium 9.7 03/08/2018 12:18 PM    Bilirubin, total 0.7 03/22/2018 10:04 AM    AST (SGOT) 35 03/22/2018 10:04 AM    Alk. phosphatase 79 03/22/2018 10:04 AM    Protein, total 6.6 03/22/2018 10:04 AM    Albumin 4.1 03/22/2018 10:04 AM    A-G Ratio 1.7 09/13/2016 12:12 PM    ALT (SGPT) 37 (H) 03/22/2018 10:04 AM       EKG:  SB     Assessment:     Assessment:      1. Essential hypertension    2. Paroxysmal atrial fibrillation (HCC)    3. Obesity (BMI 30-39.9)        Orders Placed This Encounter    AMB POC EKG ROUTINE W/ 12 LEADS, INTER & REP     Order Specific Question:   Reason for Exam:     Answer:   ROUTINE        Plan:     Pt presents for f/u. Normal stress test 3/17. EF 50-55%, no valvular pathology, LA moderately dilated per Echo 03/17. Holter showed short runs of AF in 03/17. PAF  Maintaining SR  Tolerating BB at reduced dose (Lopressor 12.5 mg BID)  [id number] score of 4: On Eliquis for [address] [place] [address]; tolerating well. Due to low AF burden that she could feel and complete resolution of palpitations, she may hold Eliquis for 2-3 days for endoscopy or dental procedure. HTN  Mildly elevated. PCP dc Amlodipine d/t c/o dizziness which has resolved since then. Will conservatively treat HTN. Continue current therapy    History of snoring:  She states she used to snore but no longer does. She states she has been tested for sleep apnea in approximately the spring 2017 with no evidence of sleep apnea.     Morbid obesity:  She has been talking with her  about pursuing a significantly carbohydrate restricted diet.   I told her I agree with significant carbohydrate reduction and in the long run to introduce small quantities of high-fiber carbohydrates. Goal of 22 pounds (10%) weight loss in 6 months through that and increased exercise. Had tried Contrave but dc d/t dizziness    Lipids and labs followed by PCP    Continue current care and f/u in 12 months.     Enrique Longo MD

## 2018-06-06 ENCOUNTER — ANESTHESIA EVENT (OUTPATIENT)
Dept: ENDOSCOPY | Age: 78
End: 2018-06-06
Payer: MEDICARE

## 2018-06-06 ENCOUNTER — ANESTHESIA (OUTPATIENT)
Dept: ENDOSCOPY | Age: 78
End: 2018-06-06
Payer: MEDICARE

## 2018-06-06 ENCOUNTER — HOSPITAL ENCOUNTER (OUTPATIENT)
Age: 78
Setting detail: OUTPATIENT SURGERY
Discharge: HOME | End: 2018-06-06
Attending: INTERNAL MEDICINE | Admitting: INTERNAL MEDICINE
Payer: MEDICARE

## 2018-06-06 VITALS
DIASTOLIC BLOOD PRESSURE: 77 MMHG | HEART RATE: 61 BPM | SYSTOLIC BLOOD PRESSURE: 137 MMHG | TEMPERATURE: 97.6 F | OXYGEN SATURATION: 97 % | BODY MASS INDEX: 39.51 KG/M2 | WEIGHT: 223 LBS | RESPIRATION RATE: 16 BRPM | HEIGHT: 63 IN

## 2018-06-06 PROCEDURE — 88305 TISSUE EXAM BY PATHOLOGIST: CPT | Performed by: INTERNAL MEDICINE

## 2018-06-06 PROCEDURE — 77030013992 HC SNR POLYP ENDOSC BSC -B: Performed by: INTERNAL MEDICINE

## 2018-06-06 PROCEDURE — 76060000031 HC ANESTHESIA FIRST 0.5 HR: Performed by: INTERNAL MEDICINE

## 2018-06-06 PROCEDURE — 74011250636 HC RX REV CODE- 250/636: Performed by: INTERNAL MEDICINE

## 2018-06-06 PROCEDURE — 74011250636 HC RX REV CODE- 250/636

## 2018-06-06 PROCEDURE — 76040000019: Performed by: INTERNAL MEDICINE

## 2018-06-06 PROCEDURE — 74011000250 HC RX REV CODE- 250

## 2018-06-06 RX ORDER — SODIUM CHLORIDE 0.9 % (FLUSH) 0.9 %
5-10 SYRINGE (ML) INJECTION EVERY 8 HOURS
Status: DISCONTINUED | OUTPATIENT
Start: 2018-06-06 | End: 2018-06-06 | Stop reason: HOSPADM

## 2018-06-06 RX ORDER — EPINEPHRINE 0.1 MG/ML
1 INJECTION INTRACARDIAC; INTRAVENOUS
Status: DISCONTINUED | OUTPATIENT
Start: 2018-06-06 | End: 2018-06-06 | Stop reason: HOSPADM

## 2018-06-06 RX ORDER — SODIUM CHLORIDE 0.9 % (FLUSH) 0.9 %
5-10 SYRINGE (ML) INJECTION AS NEEDED
Status: DISCONTINUED | OUTPATIENT
Start: 2018-06-06 | End: 2018-06-06 | Stop reason: HOSPADM

## 2018-06-06 RX ORDER — ATROPINE SULFATE 0.1 MG/ML
0.5 INJECTION INTRAVENOUS
Status: DISCONTINUED | OUTPATIENT
Start: 2018-06-06 | End: 2018-06-06 | Stop reason: HOSPADM

## 2018-06-06 RX ORDER — LIDOCAINE HYDROCHLORIDE 20 MG/ML
INJECTION, SOLUTION EPIDURAL; INFILTRATION; INTRACAUDAL; PERINEURAL AS NEEDED
Status: DISCONTINUED | OUTPATIENT
Start: 2018-06-06 | End: 2018-06-06 | Stop reason: HOSPADM

## 2018-06-06 RX ORDER — FLUMAZENIL 0.1 MG/ML
0.2 INJECTION INTRAVENOUS
Status: DISCONTINUED | OUTPATIENT
Start: 2018-06-06 | End: 2018-06-06 | Stop reason: HOSPADM

## 2018-06-06 RX ORDER — PROPOFOL 10 MG/ML
INJECTION, EMULSION INTRAVENOUS AS NEEDED
Status: DISCONTINUED | OUTPATIENT
Start: 2018-06-06 | End: 2018-06-06 | Stop reason: HOSPADM

## 2018-06-06 RX ORDER — NALOXONE HYDROCHLORIDE 0.4 MG/ML
0.4 INJECTION, SOLUTION INTRAMUSCULAR; INTRAVENOUS; SUBCUTANEOUS
Status: DISCONTINUED | OUTPATIENT
Start: 2018-06-06 | End: 2018-06-06 | Stop reason: HOSPADM

## 2018-06-06 RX ORDER — GLYCOPYRROLATE 0.2 MG/ML
INJECTION INTRAMUSCULAR; INTRAVENOUS AS NEEDED
Status: DISCONTINUED | OUTPATIENT
Start: 2018-06-06 | End: 2018-06-06 | Stop reason: HOSPADM

## 2018-06-06 RX ORDER — SODIUM CHLORIDE 9 MG/ML
50 INJECTION, SOLUTION INTRAVENOUS CONTINUOUS
Status: DISCONTINUED | OUTPATIENT
Start: 2018-06-06 | End: 2018-06-06 | Stop reason: HOSPADM

## 2018-06-06 RX ORDER — DEXTROMETHORPHAN/PSEUDOEPHED 2.5-7.5/.8
1.2 DROPS ORAL
Status: DISCONTINUED | OUTPATIENT
Start: 2018-06-06 | End: 2018-06-06 | Stop reason: HOSPADM

## 2018-06-06 RX ADMIN — LIDOCAINE HYDROCHLORIDE 50 MG: 20 INJECTION, SOLUTION EPIDURAL; INFILTRATION; INTRACAUDAL; PERINEURAL at 07:55

## 2018-06-06 RX ADMIN — PROPOFOL 30 MG: 10 INJECTION, EMULSION INTRAVENOUS at 08:08

## 2018-06-06 RX ADMIN — GLYCOPYRROLATE 0.2 MG: 0.2 INJECTION INTRAMUSCULAR; INTRAVENOUS at 07:55

## 2018-06-06 RX ADMIN — PROPOFOL 30 MG: 10 INJECTION, EMULSION INTRAVENOUS at 08:02

## 2018-06-06 RX ADMIN — PROPOFOL 70 MG: 10 INJECTION, EMULSION INTRAVENOUS at 07:55

## 2018-06-06 RX ADMIN — PROPOFOL 20 MG: 10 INJECTION, EMULSION INTRAVENOUS at 08:00

## 2018-06-06 RX ADMIN — PROPOFOL 20 MG: 10 INJECTION, EMULSION INTRAVENOUS at 08:05

## 2018-06-06 RX ADMIN — SODIUM CHLORIDE 50 ML/HR: 900 INJECTION, SOLUTION INTRAVENOUS at 07:20

## 2018-06-06 RX ADMIN — PROPOFOL 20 MG: 10 INJECTION, EMULSION INTRAVENOUS at 08:10

## 2018-06-06 RX ADMIN — PROPOFOL 30 MG: 10 INJECTION, EMULSION INTRAVENOUS at 07:57

## 2018-06-06 NOTE — PERIOP NOTES
Anesthesia reports 220 mg Propofol, 50 mg Lidocaine, 0.2 mg Robinul  and 300 mL NS given during procedure. Received report from anesthesia staff on vital signs and status of patient.

## 2018-06-06 NOTE — IP AVS SNAPSHOT
[address] 
[phone] Patient: Conner Taylor MRN: [id number] About your hospitalization You were admitted on:  June 6, 2018 You last received care in the:  [facility] ENDOSCOPY You were discharged on:  June 6, 2018 Why you were hospitalized Your primary diagnosis was:  Not on File Follow-up Information None Discharge Orders None A check honey indicates which time of day the medication should be taken. My Medications CONTINUE taking these medications Instructions Each Dose to Equal  
 Morning Noon Evening Bedtime  
 apixaban 5 mg tablet Commonly known as:  Rocael Khushi Your last dose was: Your next dose is: Take 1 Tab by mouth two (2) times a day. 5 mg Calcium Citrate-Vitamin D3 tablet Commonly known as:  CITRACAL + D PETITES Your last dose was: Your next dose is: Take 1 Tab by mouth two (2) times a day. 1 Tab  
    
   
   
   
  
 levothyroxine 112 mcg tablet Commonly known as:  SYNTHROID Your last dose was: Your next dose is: TAKE ONE TABLET BY MOUTH ONCE DAILY BEFORE BREAKFAST  
     
   
   
   
  
 metoprolol tartrate 25 mg tablet Commonly known as:  LOPRESSOR Your last dose was: Your next dose is: Take 0.5 Tabs by mouth two (2) times a day. 12.5 mg  
    
   
   
   
  
 TURMERIC (BULK) Your last dose was: Your next dose is:    
   
   
 by Does Not Apply route daily. Discharge Instructions Ta Chan [id number] 
1940 COLON DISCHARGE INSTRUCTIONS Discomfort: 
Redness at IV site- apply warm compress to area; if redness or soreness persist- contact your physician There may be a slight amount of blood passed from the rectum Gaseous discomfort- walking, belching will help relieve any discomfort You may not operate a vehicle for 12 hours You may not engage in an occupation involving machinery or appliances for rest of today You may not drink alcoholic beverages for at least 12 hours Avoid making any critical decisions for at least 24 hour DIET: 
 Regular diet  however -  remember your colon is empty and a heavy meal will produce gas. ACTIVITY: It is recommended that you spend the remainder of the day resting -  avoid any strenuous activity. CALL M.D. ANY SIGN OF: Increasing pain, nausea, vomiting Abdominal distension (swelling) New increased bleeding (oral or rectal) Fever (chills) Follow-up Instructions: 
 Call Dr. Marcia Meade Biopsy results in 10 days Telephone # [phone] Brief Findings:  3 tiny polyps removed DISCHARGE SUMMARY from Nurse The following personal items collected during your admission are returned to you:  
Dental Appliance: Dental Appliances: None Vision: Visual Aid: None Hearing Aid:   
Jewelry:   
Clothing:   
Other Valuables:   
Valuables sent to safe:   
 
 
ACO Transitions of Care [facility] offers a voluntary care coordination program to provide high quality service and care to [facility] fee-for-service beneficiaries. Alex Rae was designed to help you enhance your health and well-being through the following services: ? Transitions of Care  support for individuals who are transitioning from one care setting to another (example: Hospital to home). ? Chronic and Complex Care Coordination  support for individuals and caregivers of those with serious or chronic illnesses or with more than one chronic (ongoing) condition and those who take a number of different medications.   
 
 
If you meet specific medical criteria, a [facility] Coordinator may call you directly to coordinate your care with your primary care physician and your other care providers. For questions about the [facility] programs, please, contact your physicians office. For general questions or additional information about Accountable Care Organizations: 
Please visit www.medicare.gov/acos. html or call 1-800-MEDICARE (9-718.892.4022) TTY users should call 5-730.392.8458. Introducing [facility] & HEALTH SERVICES! [facility] introduces [employer] patient portal. Now you can access parts of your medical record, email your doctor's office, and request medication refills online. 1. In your internet browser, go to [email or web address] 2. Click on the First Time User? Click Here link in the Sign In box. You will see the New Member Sign Up page. 3. Enter your [employer] Access Code exactly as it appears below. You will not need to use this code after youve completed the sign-up process. If you do not sign up before the expiration date, you must request a new code. · [employer] Access Code: [email or web address] Expires: 9/4/2018  6:33 AM 
 
4. Enter the last four digits of your Social Security Number (xxxx) and Date of Birth (mm/dd/yyyy) as indicated and click Submit. You will be taken to the next sign-up page. 5. Create a [employer] ID. This will be your [employer] login ID and cannot be changed, so think of one that is secure and easy to remember. 6. Create a [employer] password. You can change your password at any time. 7. Enter your Password Reset Question and Answer. This can be used at a later time if you forget your password. 8. Enter your e-mail address. You will receive e-mail notification when new information is available in [address] 9. Click Sign Up. You can now view and download portions of your medical record. 10. Click the Download Summary menu link to download a portable copy of your medical information. If you have questions, please visit the Frequently Asked Questions section of the MyChart website. Remember, [employer] is NOT to be used for urgent needs. For medical emergencies, dial 911. Now available from your iPhone and Android! Introducing Monico Andersen As a [facility] patient, I wanted to make you aware of our electronic visit tool called Monico Andersen. [employer] allows you to connect within minutes with a medical provider 24 hours a day, seven days a week via a mobile device or tablet or logging into a secure website from your computer. You can access Monico Andersen from anywhere in the [place]. A virtual visit might be right for you when you have a simple condition and feel like you just dont want to get out of bed, or cant get away from work for an appointment, when your regular [facility] provider is not available (evenings, weekends or holidays), or when youre out of town and need minor care. Electronic visits cost only $49 and if the [employer] provider determines a prescription is needed to treat your condition, one can be electronically transmitted to a nearby pharmacy*. Please take a moment to enroll today if you have not already done so. The enrollment process is free and takes just a few minutes. To enroll, please download the [employer] lorri to your tablet or phone, or visit [email or web address] to enroll on your computer. And, as an [address] [place] [address] patient with a [employer] account, the results of your visits will be scanned into your electronic medical record and your primary care provider will be able to view the scanned results. We urge you to continue to see your regular [facility] provider for your ongoing medical care.   And while your primary care provider may not be the one available when you seek a Monico Andersen virtual visit, the peace of mind you get from getting a real diagnosis real time can be priceless. For more information on Monico Andersen, view our Frequently Asked Questions (FAQs) at [email or web address]. Sincerely, 
 
Karina Oliveros MD 
Chief Medical Officer 50Anant Chen *:  certain medications cannot be prescribed via Monico Andersen Providers Seen During Your Hospitalization Provider Specialty Primary office phone Geo Mortensen MD Gastroenterology [phone] Your Primary Care Physician (PCP) Primary Care Physician Office Phone Office Fax Arlyss Milder [phone] You are allergic to the following Allergen Reactions Lisinopril Cough Recent Documentation Height Weight BMI OB Status Smoking Status 1.6 m 101.2 kg 39.5 kg/m2 Ablation Never Smoker Emergency Contacts Name Discharge Info Relation Home Work Mobile MichelleHerb greenberg DISCHARGE CAREGIVER [3] Spouse [3] [phone] Patient Belongings The following personal items are in your possession at time of discharge: 
  Dental Appliances: None  Visual Aid: None Please provide this summary of care documentation to your next provider. Signatures-by signing, you are acknowledging that this After Visit Summary has been reviewed with you and you have received a copy. Patient Signature:  ____________________________________________________________ Date:  ____________________________________________________________  
  
Siddhartha William Provider Signature:  ____________________________________________________________ Date:  ____________________________________________________________

## 2018-06-06 NOTE — PROCEDURES
Colonoscopy Operative Report  Keegan Rued  1940  [id number]      Procedure Type:   Colonoscopy with polypectomy (cold snare)     Indications:     Family history of coloretal cancer (screening only), Occult blood in stool    Pre-operative Diagnosis: see indication above    Post-operative Diagnosis:  See findings below    : Paras Edwards MD    Referring Provider: Lauren Champion    Sedation:  MAC anesthesia Propofol    Pre-Procedural Exam:      Airway: clear, Malimpati 2   Heart: RRR, without gallops or rubs  Lungs: clear bilaterally without wheezes, crackles, or rhonchi  Abdomen: soft, nontender, nondistended, bowel sounds present     Procedure Details:  After informed consent was obtained with all risks and benefits of procedure explained and preoperative exam completed, the patient was taken to the endoscopy suite and placed in the left lateral decubitus position. Upon sequential sedation as per above, a digital rectal exam was performed. The Olympus videocolonoscope [id number] was inserted in the rectum and carefully advanced to the cecum, which was identified by the cecal strap. The quality of preparation was good. The colonoscope was slowly withdrawn with careful evaluation between folds. Retoflexion in the rectum was completed. Findings/Impression: Rectum:   Normal  Sigmoid:     - Excavated lesions:     - Diverticulosis    - Protruding lesions:     -Sessile Polyp(s) size 2 and 2 mm removed by polypectomy (cold snare)  Descending Colon:     - Protruding lesions:     -Sessile Polyp(s) size 2 mm removed by polypectomy (cold snare)  Transverse Colon:   Normal  Ascending Colon:   Normal  Cecum:   Normal            Specimen Removed:  Tiny polyps    Complications: None. EBL:  None. Recommendations: --Repeat colonoscopy in 5 years. , -Follow up with primary care physician. Regular diet. Resume normal medication(s). You may be asked to call your doctor's office for the results.      Discharge Disposition:  Home in the company of a  when able to ambulate.

## 2018-06-06 NOTE — ROUTINE PROCESS
Lesly Cruz  1940  [id number]    Situation:  Verbal report received from: Mylene Bang RN  Procedure: Procedure(s):  COLONOSCOPY  ENDOSCOPIC POLYPECTOMY    Background:    Preoperative diagnosis: ABNORMAL STOOL CONTENTS FINDINGS. FAMILY HISTORY COLON CANCER  Postoperative diagnosis:  colon polyp    :  Dr. Tamia Jay  Assistant(s): Endoscopy Technician-1: Emily Peterson  Endoscopy RN-1: Louie Sheets    Specimens:   ID Type Source Tests Collected by Time Destination   1 : polyp Preservative Colon, Descending  Jesus Aguirre MD 6/6/2018 0805 Pathology   2 : polyp Preservative Sigmoid  Jesus Aguirre MD 6/6/2018 1562 Pathology     H. Pylori  no    Assessment:  Intra-procedure medications       Anesthesia gave intra-procedure sedation and medications, see anesthesia flow sheet     Intravenous fluids: NS@ KVO     Vital signs stable     Abdominal assessment: round and soft     Recommendation:  Discharge patient per MD order  Family or Friend   Permission to share finding with family or friend yes

## 2018-06-06 NOTE — PERIOP NOTES
Endoscope was pre-cleaned at the bedside immediately following procedure by [employer] tech.

## 2018-06-06 NOTE — H&P
Gastroenterology History and Physical    Patient: Genaro Suzy    Physician: Linda Quezada MD    Vital Signs: Blood pressure 138/88, pulse (!) 58, temperature 97.5 °F (36.4 °C), resp. rate 13, height 5' 3\" (1.6 m), weight 101.2 kg (223 lb), SpO2 97 %. Allergies: Allergies   Allergen Reactions    Lisinopril Cough       Indication: heme pos stool and FH colon cancer    History:  Past Medical History:   Diagnosis Date    Arthritis     GERD (gastroesophageal reflux disease)     Hypercholesterolemia     Hypertension     Hypothyroid 2004      Past Surgical History:   Procedure Laterality Date    HX APPENDECTOMY  1960    HX TUBAL LIGATION  1969      Social History     Social History    Marital status:      Spouse name: N/A    Number of children: N/A    Years of education: N/A     Social History Main Topics    Smoking status: Never Smoker    Smokeless tobacco: Never Used    Alcohol use Yes      Comment: 4 maybe more    Drug use: No    Sexual activity: Yes     Partners: Male     Birth control/ protection: None     Other Topics Concern    None     Social History Narrative    sailing      Family History   Problem Relation Age of Onset    Heart Disease Father 79    Hypertension Father     Stroke Father     Psychiatric Disorder Sister     Cancer Brother     Psychiatric Disorder Brother        Medications:   Prior to Admission medications    Medication Sig Start Date End Date Taking? Authorizing Provider   TURMERIC, BULK, by Does Not Apply route daily. Yes Historical Provider   metoprolol tartrate (LOPRESSOR) 25 mg tablet Take 0.5 Tabs by mouth two (2) times a day. 3/22/18  Yes Chapito Reza MD   Calcium Citrate-Vitamin D3 (CITRACAL + D PETITES) tablet Take 1 Tab by mouth two (2) times a day.  3/22/18  Yes Chapito Reza MD   levothyroxine (SYNTHROID) 112 mcg tablet TAKE ONE TABLET BY MOUTH ONCE DAILY BEFORE BREAKFAST 3/8/18  Yes Chapito Reza MD   apixaban (ELIQUIS) 5 mg tablet Take 1 Tab by mouth two (2) times a day. 3/22/18   Carrie Irving MD       Physical Exam:   HEENT: Head: Normocephalic, no lesions, without obvious abnormality.    Heart: regular rate and rhythm, S1, S2 normal, no murmur, click, rub or gallop   Lungs: chest clear, no wheezing, rales, normal symmetric air entry, Heart exam - S1, S2 normal, no murmur, no gallop, rate regular   Abdominal: Bowel sounds are normal, liver is not enlarged, spleen is not enlarged     Signed:  Radha Dalton MD 6/6/2018

## 2018-06-06 NOTE — ANESTHESIA PREPROCEDURE EVALUATION
Anesthetic History   No history of anesthetic complications            Review of Systems / Medical History  Patient summary reviewed, nursing notes reviewed and pertinent labs reviewed    Pulmonary  Within defined limits                 Neuro/Psych   Within defined limits           Cardiovascular    Hypertension        Dysrhythmias : atrial fibrillation  Hyperlipidemia    Exercise tolerance: <4 METS     GI/Hepatic/Renal     GERD          Comments: ABNORMAL STOOL CONTENTS  Endo/Other      Hypothyroidism  Morbid obesity and arthritis     Other Findings            Physical Exam    Airway  Mallampati: III    Neck ROM: normal range of motion   Mouth opening: Normal     Cardiovascular  Regular rate and rhythm,  S1 and S2 normal,  no murmur, click, rub, or gallop             Dental    Dentition: Caps/crowns and Implants     Pulmonary  Breath sounds clear to auscultation               Abdominal  GI exam deferred       Other Findings            Anesthetic Plan    ASA: 3  Anesthesia type: total IV anesthesia          Induction: Intravenous  Anesthetic plan and risks discussed with: Patient

## 2018-06-06 NOTE — ANESTHESIA POSTPROCEDURE EVALUATION
Post-Anesthesia Evaluation and Assessment    Patient: Genaro Almonte MRN: [id number]  SSN: [id number]    YOB: 1940  Age: 78 y.o. Sex: female       Cardiovascular Function/Vital Signs  Visit Vitals    /67    Pulse (!) 58    Temp 36.1 °C (97 °F)    Resp 12    Ht 5' 3\" (1.6 m)    Wt 101.2 kg (223 lb)    SpO2 96%    BMI 39.5 kg/m2       Patient is status post total IV anesthesia anesthesia for Procedure(s):  COLONOSCOPY  ENDOSCOPIC POLYPECTOMY. Nausea/Vomiting: None    Postoperative hydration reviewed and adequate. Pain:  Pain Scale 1: Numeric (0 - 10) (06/06/18 0709)  Pain Intensity 1: 0 (06/06/18 0709)   Managed    Neurological Status: At baseline    Mental Status and Level of Consciousness: Arousable    Pulmonary Status:   O2 Device: CO2 nasal cannula (06/06/18 0816)   Adequate oxygenation and airway patent    Complications related to anesthesia: None    Post-anesthesia assessment completed.  No concerns    Signed By: Kyle Evangelista MD     June 6, 2018

## 2018-06-06 NOTE — DISCHARGE INSTRUCTIONS
Loan Daniels  [id number]  1940    COLON DISCHARGE INSTRUCTIONS  Discomfort:  Redness at IV site- apply warm compress to area; if redness or soreness persist- contact your physician  There may be a slight amount of blood passed from the rectum  Gaseous discomfort- walking, belching will help relieve any discomfort  You may not operate a vehicle for 12 hours  You may not engage in an occupation involving machinery or appliances for rest of today  You may not drink alcoholic beverages for at least 12 hours  Avoid making any critical decisions for at least 24 hour  DIET:   Regular diet    - however -  remember your colon is empty and a heavy meal will produce gas. ACTIVITY:  It is recommended that you spend the remainder of the day resting -  avoid any strenuous activity. CALL M.D.   ANY SIGN OF:   Increasing pain, nausea, vomiting  Abdominal distension (swelling)  New increased bleeding (oral or rectal)  Fever (chills)    Follow-up Instructions:   Call Dr. Yudy Thompson results in 10 days  Telephone # [phone]  Brief Findings:  3 tiny polyps removed        DISCHARGE SUMMARY from Nurse    The following personal items collected during your admission are returned to you:   Dental Appliance: Dental Appliances: None  Vision: Visual Aid: None  Hearing Aid:    Jewelry:    Clothing:    Other Valuables:    Valuables sent to safe:

## 2018-07-02 ENCOUNTER — TELEPHONE (OUTPATIENT)
Dept: CARDIOLOGY CLINIC | Age: 78
End: 2018-07-02

## 2018-07-02 NOTE — TELEPHONE ENCOUNTER
Request was sent over on 5-  For Cardiac Clearance from Dr Pascual Ventura office [facility] ) [phone]  Fax [phone]        Need status of request-  Thanks-

## 2018-07-20 ENCOUNTER — TELEPHONE (OUTPATIENT)
Dept: CARDIOLOGY CLINIC | Age: 78
End: 2018-07-20

## 2018-07-20 NOTE — TELEPHONE ENCOUNTER
Faxed to [facility] DDS@ [phone] last office visit  Dated 5/31/18 for oral surgery clearance as requested.

## 2018-08-13 ENCOUNTER — TELEPHONE (OUTPATIENT)
Dept: CARDIOLOGY CLINIC | Age: 78
End: 2018-08-13

## 2018-08-13 NOTE — TELEPHONE ENCOUNTER
Patient states that we should have received a new clearance form from her dental office. Please call patient when available to speak with her further.     Thanks,    [facility]/[employer]

## 2018-09-28 ENCOUNTER — CLINICAL SUPPORT (OUTPATIENT)
Dept: INTERNAL MEDICINE CLINIC | Age: 78
End: 2018-09-28

## 2018-09-28 VITALS — TEMPERATURE: 96.7 F

## 2018-09-28 DIAGNOSIS — Z23 ENCOUNTER FOR IMMUNIZATION: ICD-10-CM

## 2018-09-28 NOTE — PATIENT INSTRUCTIONS
Vaccine Information Statement    Influenza (Flu) Vaccine (Inactivated or Recombinant): What you need to know    Many Vaccine Information Statements are available in [place] and other languages. See www.immunize.org/vis  Hojas de Información Sobre Vacunas están disponibles en Español y en muchos otros idiomas. Visite www.immunize.org/vis    1. Why get vaccinated? Influenza (flu) is a contagious disease that spreads around the [place] every year, usually between October and May. Flu is caused by influenza viruses, and is spread mainly by coughing, sneezing, and close contact. Anyone can get flu. Flu strikes suddenly and can last several days. Symptoms vary by age, but can include:   fever/chills   sore throat   muscle aches   fatigue   cough   headache    runny or stuffy nose    Flu can also lead to pneumonia and blood infections, and cause diarrhea and seizures in children. If you have a medical condition, such as heart or lung disease, flu can make it worse. Flu is more dangerous for some people. Infants and young children, people 72years of age and older, pregnant women, and people with certain health conditions or a weakened immune system are at greatest risk. Each year thousands of people in the [facility] die from flu, and many more are hospitalized. Flu vaccine can:   keep you from getting flu,   make flu less severe if you do get it, and   keep you from spreading flu to your family and other people. 2. Inactivated and recombinant flu vaccines    A dose of flu vaccine is recommended every flu season. Children 6 months through 6years of age may need two doses during the same flu season. Everyone else needs only one dose each flu season.        Some inactivated flu vaccines contain a very small amount of a mercury-based preservative called thimerosal. Studies have not shown thimerosal in vaccines to be harmful, but flu vaccines that do not contain thimerosal are available. There is no live flu virus in flu shots. They cannot cause the flu. There are many flu viruses, and they are always changing. Each year a new flu vaccine is made to protect against three or four viruses that are likely to cause disease in the upcoming flu season. But even when the vaccine doesnt exactly match these viruses, it may still provide some protection    Flu vaccine cannot prevent:   flu that is caused by a virus not covered by the vaccine, or   illnesses that look like flu but are not. It takes about 2 weeks for protection to develop after vaccination, and protection lasts through the flu season. 3. Some people should not get this vaccine    Tell the person who is giving you the vaccine:     If you have any severe, life-threatening allergies. If you ever had a life-threatening allergic reaction after a dose of flu vaccine, or have a severe allergy to any part of this vaccine, you may be advised not to get vaccinated. Most, but not all, types of flu vaccine contain a small amount of egg protein.  If you ever had Guillain-Barré Syndrome (also called GBS). Some people with a history of GBS should not get this vaccine. This should be discussed with your doctor.  If you are not feeling well. It is usually okay to get flu vaccine when you have a mild illness, but you might be asked to come back when you feel better. 4. Risks of a vaccine reaction    With any medicine, including vaccines, there is a chance of reactions. These are usually mild and go away on their own, but serious reactions are also possible. Most people who get a flu shot do not have any problems with it.      Minor problems following a flu shot include:    soreness, redness, or swelling where the shot was given     hoarseness   sore, red or itchy eyes   cough   fever   aches   headache   itching   fatigue  If these problems occur, they usually begin soon after the shot and last 1 or 2 days. More serious problems following a flu shot can include the following:     There may be a small increased risk of Guillain-Barré Syndrome (GBS) after inactivated flu vaccine. This risk has been estimated at 1 or 2 additional cases per million people vaccinated. This is much lower than the risk of severe complications from flu, which can be prevented by flu vaccine.  Young children who get the flu shot along with pneumococcal vaccine (PCV13) and/or DTaP vaccine at the same time might be slightly more likely to have a seizure caused by fever. Ask your doctor for more information. Tell your doctor if a child who is getting flu vaccine has ever had a seizure. Problems that could happen after any injected vaccine:      People sometimes faint after a medical procedure, including vaccination. Sitting or lying down for about 15 minutes can help prevent fainting, and injuries caused by a fall. Tell your doctor if you feel dizzy, or have vision changes or ringing in the ears.  Some people get severe pain in the shoulder and have difficulty moving the arm where a shot was given. This happens very rarely.  Any medication can cause a severe allergic reaction. Such reactions from a vaccine are very rare, estimated at about 1 in a million doses, and would happen within a few minutes to a few hours after the vaccination. As with any medicine, there is a very remote chance of a vaccine causing a serious injury or death. The safety of vaccines is always being monitored. For more information, visit: www.cdc.gov/vaccinesafety/    5. What if there is a serious reaction? What should I look for?  Look for anything that concerns you, such as signs of a severe allergic reaction, very high fever, or unusual behavior.     Signs of a severe allergic reaction can include hives, swelling of the face and throat, difficulty breathing, a fast heartbeat, dizziness, and weakness - usually within a few minutes to a few hours after the vaccination. What should I do?  If you think it is a severe allergic reaction or other emergency that cant wait, call 9-1-1 and get the person to the nearest hospital. Otherwise, call your doctor.  Reactions should be reported to the Vaccine Adverse Event Reporting System (VAERS). Your doctor should file this report, or you can do it yourself through  the VAERS web site at www.vaers. [facility].gov, or by calling 9-987.716.1423. VAERS does not give medical advice. 6. The National Vaccine Injury Compensation Program    The [facility] Vaccine Injury Compensation Program (VICP) is a federal program that was created to compensate people who may have been injured by certain vaccines. Persons who believe they may have been injured by a vaccine can learn about the program and about filing a claim by calling 5-151.275.8756 or visiting the [employer] website at www.[facility].gov/vaccinecompensation. There is a time limit to file a claim for compensation. 7. How can I learn more?  Ask your healthcare provider. He or she can give you the vaccine package insert or suggest other sources of information.  Call your local or state health department.  Contact the Centers for Disease Control and Prevention (CDC):  - Call 3-425.850.1456 (1-800-CDC-INFO) or  - Visit CDCs website at www.cdc.gov/flu    Vaccine Information Statement   Inactivated Influenza Vaccine   8/7/2015  [address] [id number]-[id number]    Department of Health and Human Services  Centers for Disease Control and Prevention    Office Use Only

## 2018-09-28 NOTE — PROGRESS NOTES
Chief Complaint   Patient presents with    Immunization/Injection     Immunization/s administered 9/28/2018 by Tera Lewis LPN. Patient tolerated procedure well. No reactions noted.

## 2018-10-31 DIAGNOSIS — E03.9 HYPOTHYROIDISM, UNSPECIFIED TYPE: ICD-10-CM

## 2018-11-01 RX ORDER — LEVOTHYROXINE SODIUM 112 UG/1
TABLET ORAL
Qty: 90 TAB | Refills: 1 | Status: SHIPPED | OUTPATIENT
Start: 2018-11-01 | End: 2019-06-11 | Stop reason: SDUPTHER

## 2019-06-11 DIAGNOSIS — E03.9 HYPOTHYROIDISM, UNSPECIFIED TYPE: ICD-10-CM

## 2019-06-12 ENCOUNTER — OFFICE VISIT (OUTPATIENT)
Dept: INTERNAL MEDICINE CLINIC | Age: 79
End: 2019-06-12

## 2019-06-12 VITALS
DIASTOLIC BLOOD PRESSURE: 72 MMHG | TEMPERATURE: 97.6 F | OXYGEN SATURATION: 95 % | HEIGHT: 63 IN | WEIGHT: 225 LBS | RESPIRATION RATE: 16 BRPM | HEART RATE: 48 BPM | SYSTOLIC BLOOD PRESSURE: 114 MMHG | BODY MASS INDEX: 39.87 KG/M2

## 2019-06-12 DIAGNOSIS — Z13.6 SCREENING FOR ISCHEMIC HEART DISEASE: ICD-10-CM

## 2019-06-12 DIAGNOSIS — Z13.1 SCREENING FOR DIABETES MELLITUS: ICD-10-CM

## 2019-06-12 DIAGNOSIS — E03.9 HYPOTHYROIDISM, UNSPECIFIED TYPE: ICD-10-CM

## 2019-06-12 DIAGNOSIS — Z00.00 MEDICARE ANNUAL WELLNESS VISIT, SUBSEQUENT: Primary | ICD-10-CM

## 2019-06-12 DIAGNOSIS — Z13.39 SCREENING FOR ALCOHOLISM: ICD-10-CM

## 2019-06-12 DIAGNOSIS — Z13.31 SCREENING FOR DEPRESSION: ICD-10-CM

## 2019-06-12 DIAGNOSIS — R74.8 ELEVATED LIVER ENZYMES: ICD-10-CM

## 2019-06-12 DIAGNOSIS — Z23 ENCOUNTER FOR IMMUNIZATION: ICD-10-CM

## 2019-06-12 DIAGNOSIS — M65.342 TRIGGER RING FINGER OF LEFT HAND: ICD-10-CM

## 2019-06-12 RX ORDER — LEVOTHYROXINE SODIUM 112 UG/1
TABLET ORAL
Qty: 90 TAB | Refills: 1 | Status: SHIPPED | OUTPATIENT
Start: 2019-06-12 | End: 2019-12-31

## 2019-06-12 NOTE — TELEPHONE ENCOUNTER
Last office visit:  Today 06/12/2019  Last filled:  Levothyroxine 112 mcg 1 tab daily #90 X 1 refill 11/01/2018  No changes  No follow up scheduled

## 2019-06-12 NOTE — PATIENT INSTRUCTIONS
Medicare Wellness Visit, Female The best way to live healthy is to have a lifestyle where you eat a well-balanced diet, exercise regularly, limit alcohol use, and quit all forms of tobacco/nicotine, if applicable. Regular preventive services are another way to keep healthy. Preventive services (vaccines, screening tests, monitoring & exams) can help personalize your care plan, which helps you manage your own care. Screening tests can find health problems at the earliest stages, when they are easiest to treat. [facility] follows the current, evidence-based guidelines published by the [facility] ([facility]) when recommending preventive services for our patients. Because we follow these guidelines, sometimes recommendations change over time as research supports it. (For example, mammograms used to be recommended annually. Even though Medicare will still pay for an annual mammogram, the newer guidelines recommend a mammogram every two years for women of average risk.) Of course, you and your doctor may decide to screen more often for some diseases, based on your risk and your health status. Preventive services for you include: - Medicare offers their members a free annual wellness visit, which is time for you and your primary care provider to discuss and plan for your preventive service needs. Take advantage of this benefit every year! 
-All adults over the age of 72 should receive the recommended pneumonia vaccines. Current USPSTF guidelines recommend a series of two vaccines for the best pneumonia protection.  
-All adults should have a flu vaccine yearly and a tetanus vaccine every 10 years. All adults age 61 and older should receive a shingles vaccine once in their lifetime.   
-A bone mass density test is recommended when a woman turns 65 to screen for osteoporosis. This test is only recommended one time, as a screening. Some providers will use this same test as a disease monitoring tool if you already have osteoporosis. -All adults age 38-68 who are overweight should have a diabetes screening test once every three years.  
-Other screening tests and preventive services for persons with diabetes include: an eye exam to screen for diabetic retinopathy, a kidney function test, a foot exam, and stricter control over your cholesterol.  
-Cardiovascular screening for adults with routine risk involves an electrocardiogram (ECG) at intervals determined by your doctor.  
-Colorectal cancer screenings should be done for adults age 54-65 with no increased risk factors for colorectal cancer. There are a number of acceptable methods of screening for this type of cancer. Each test has its own benefits and drawbacks. Discuss with your doctor what is most appropriate for you during your annual wellness visit. The different tests include: colonoscopy (considered the best screening method), a fecal occult blood test, a fecal DNA test, and sigmoidoscopy. -Breast cancer screenings are recommended every other year for women of normal risk, age 54-69. 
-Cervical cancer screenings for women over age 72 are only recommended with certain risk factors.  
-All adults born between [facility] should be screened once for Hepatitis C. Here is a list of your current Health Maintenance items (your personalized list of preventive services) with a due date: 
Health Maintenance Due Topic Date Due  Shingles Vaccine (1 of 2) 12/18/1992  Glaucoma Screening   12/18/2007  Stool testing for trace blood  03/22/2019 [facility] Annual Well Visit  03/23/2019

## 2019-06-12 NOTE — PROGRESS NOTES
Chief Complaint   Patient presents with   [facility] Annual Wellness Visit     Clock Draw Test Done    I have reviewed the patient's medical history in detail and updated the computerized patient record. Health Maintenance reviewed. 1. Have you been to the ER, urgent care clinic since your last visit? Hospitalized since your last visit?no    2. Have you seen or consulted any other health care providers outside of the [address] [place] [address] since your last visit? Include any pap smears or colon screening. No    Pt stated he/she does have an Advance Directive    . Fall Risk Assessment, last 12 mths 6/12/2019   Able to walk? Yes   Fall in past 12 months? No   Fall with injury? -   Number of falls in past 12 months -   Fall Risk Score -       3 most recent PHQ Screens 6/12/2019   Little interest or pleasure in doing things Several days   Feeling down, depressed, irritable, or hopeless Several days   Total Score PHQ 2 2       Abuse Screening Questionnaire 6/12/2019   Do you ever feel afraid of your partner? N   Are you in a relationship with someone who physically or mentally threatens you? N   Is it safe for you to go home?  Y       ADL Assessment 6/12/2019   Feeding yourself No Help Needed   Getting from bed to chair No Help Needed   Getting dressed No Help Needed   Bathing or showering No Help Needed   Walk across the room (includes cane/walker) No Help Needed   Using the telphone No Help Needed   Taking your medications No Help Needed   Preparing meals No Help Needed   Managing money (expenses/bills) No Help Needed   Moderately strenuous housework (laundry) No Help Needed   Shopping for personal items (toiletries/medicines) No Help Needed   Shopping for groceries No Help Needed   Driving No Help Needed   Climbing a flight of stairs No Help Needed   Getting to places beyond walking distances No Help Needed

## 2019-06-12 NOTE — PROGRESS NOTES
This is the Subsequent Medicare Annual Wellness Exam, performed 12 months or more after the Initial AWV or the last Subsequent AWV    I have reviewed the patient's medical history in detail and updated the computerized patient record. History     C/o trigger finger issues. Reports 2 or more drinks daily, has sl inc lfts. Takes BT, no bleeding issues  Energy OK    Past Medical History:   Diagnosis Date    Arthritis     GERD (gastroesophageal reflux disease)     Hypercholesterolemia     Hypertension     Hypothyroid 2004      Past Surgical History:   Procedure Laterality Date    COLONOSCOPY N/A 6/6/2018    COLONOSCOPY [facility] Tej Smyth [facility] ENDOSCOPY    HX APPENDECTOMY  1960    HX TUBAL LIGATION  1969     Current Outpatient Medications   Medication Sig Dispense Refill    metoprolol tartrate (LOPRESSOR) 25 mg tablet TAKE 1 TABLET BY MOUTH TWICE DAILY 180 Tab 3    ELIQUIS 5 mg tablet TAKE 1 TABLET BY MOUTH TWICE DAILY 60 Tab 5    levothyroxine (SYNTHROID) 112 mcg tablet TAKE 1 TABLET BY MOUTH ONCE DAILY BEFORE  BREAKFAST 90 Tab 1    TURMERIC, BULK, by Does Not Apply route daily.  Calcium Citrate-Vitamin D3 (CITRACAL + D PETITES) tablet Take 1 Tab by mouth two (2) times a day. 100 Tab 5     Allergies   Allergen Reactions    Lisinopril Cough     Family History   Problem Relation Age of Onset    Heart Disease Father 79    Hypertension Father     Stroke Father     Psychiatric Disorder Sister     Cancer Brother     Psychiatric Disorder Brother      Social History     Tobacco Use    Smoking status: Never Smoker    Smokeless tobacco: Never Used   Substance Use Topics    Alcohol use: Yes     Comment: 4 maybe more     Patient Active Problem List   Diagnosis Code    Abnormal heart sounds R01.2    Generalized OA M15.9    Hypothyroidism E03.9    HTN (hypertension) I10    Paroxysmal atrial fibrillation (HCC) I48.0    Obesity (BMI 30-39. 9) E66.9    Severe obesity (BMI 35.0-39. 9) E66.01       Depression Risk Factor Screening:     3 most recent PHQ Screens 6/12/2019   Little interest or pleasure in doing things Several days   Feeling down, depressed, irritable, or hopeless Several days   Total Score PHQ 2 2     Alcohol Risk Factor Screening: You average more than 7 drinks a week. Yes we discussed red kusum with LFT's inc     Functional Ability and Level of Safety:   Hearing Loss  Hearing is good. Activities of Daily Living  The home contains: grab bars  Patient does total self care    Fall Risk  Fall Risk Assessment, last 12 mths 6/12/2019   Able to walk? Yes   Fall in past 12 months? No   Fall with injury? -   Number of falls in past 12 months -   Fall Risk Score -       Abuse Screen  Patient is not abused    Cognitive Screening   Evaluation of Cognitive Function:  Has your family/caregiver stated any concerns about your memory: no  Normal, Clock Drawing test    Patient Care Team   Patient Care Team:  Mary Guerra MD as PCP - General (Family Practice)  Kavon Mckeon MD (Cardiology)  Shyann Santana MD as Physician (Sleep Medicine)    Assessment/Plan   Education and counseling provided:  Cardiovascular screening blood test  Diabetes screening test  Send to see hand doctor  Dec etoh  Check thyroid      ICD-10-CM ICD-9-CM    1. Trigger ring finger of left hand M65.342 727.03 REFERRAL TO ORTHOPEDIC SURGERY      MD HANDLG&/OR CONVEY OF SPEC FOR TR OFFICE TO LAB      COLLECTION VENOUS BLOOD,VENIPUNCTURE   2. Medicare annual wellness visit, subsequent Z00.00 V70.0 MD HANDLG&/OR CONVEY OF SPEC FOR TR OFFICE TO LAB      COLLECTION VENOUS BLOOD,VENIPUNCTURE   3. Screening for alcoholism Z13.39 V79.1 MD ANNUAL ALCOHOL SCREEN 15 MIN      MD HANDLG&/OR CONVEY OF SPEC FOR TR OFFICE TO LAB      COLLECTION VENOUS BLOOD,VENIPUNCTURE   4.  Screening for depression Z13.31 V79.0 DEPRESSION SCREEN ANNUAL      MD HANDLG&/OR CONVEY OF SPEC FOR TR OFFICE TO LAB      COLLECTION VENOUS BLOOD,VENIPUNCTURE 5. Screening for diabetes mellitus C33.1 B19.5 METABOLIC PANEL, COMPREHENSIVE      [place] HANDLG&/OR CONVEY OF SPEC FOR TR OFFICE TO LAB      COLLECTION VENOUS BLOOD,VENIPUNCTURE   6. Screening for ischemic heart disease Z13.6 V81.0 [place] HANDLG&/OR CONVEY OF SPEC FOR TR OFFICE TO LAB      COLLECTION VENOUS BLOOD,VENIPUNCTURE      CANCELED: LIPID PANEL   7. Hypothyroidism, unspecified type E03.9 244.9 TSH 3RD GENERATION      [place] HANDLG&/OR CONVEY OF SPEC FOR TR OFFICE TO LAB      COLLECTION VENOUS BLOOD,VENIPUNCTURE   8.  Encounter for immunization Z23 V03.89 diph,Pertuss,Acell,,Tet Vac-PF (ADACEL) 2 Lf-(2.5-5-3-5 mcg)-5Lf/0.5 mL susp      varicella-zoster recombinant, PF, (SHINGRIX) 50 mcg/0.5 mL susr injection      [place] HANDLG&/OR CONVEY OF SPEC FOR TR OFFICE TO LAB      COLLECTION VENOUS BLOOD,VENIPUNCTURE         Health Maintenance Due   Topic Date Due    Shingrix Vaccine Age 50> (1 of 2) 12/18/1992    GLAUCOMA SCREENING Q2Y  12/18/2007    FOBT Q 1 YEAR, 18+  03/22/2019    MEDICARE YEARLY EXAM  03/23/2019

## 2019-06-13 LAB
ALBUMIN SERPL-MCNC: 4.2 G/DL (ref 3.5–4.8)
ALBUMIN/GLOB SERPL: 1.6 {RATIO} (ref 1.2–2.2)
ALP SERPL-CCNC: 82 IU/L (ref 39–117)
ALT SERPL-CCNC: 41 IU/L (ref 0–32)
AST SERPL-CCNC: 41 IU/L (ref 0–40)
BILIRUB SERPL-MCNC: 0.6 MG/DL (ref 0–1.2)
BUN SERPL-MCNC: 14 MG/DL (ref 8–27)
BUN/CREAT SERPL: 18 (ref 12–28)
CALCIUM SERPL-MCNC: 10.1 MG/DL (ref 8.7–10.3)
CHLORIDE SERPL-SCNC: 100 MMOL/L (ref 96–106)
CHOLEST SERPL-MCNC: 238 MG/DL (ref 100–199)
CO2 SERPL-SCNC: 23 MMOL/L (ref 20–29)
CREAT SERPL-MCNC: 0.78 MG/DL (ref 0.57–1)
GLOBULIN SER CALC-MCNC: 2.7 G/DL (ref 1.5–4.5)
GLUCOSE SERPL-MCNC: 78 MG/DL (ref 65–99)
HDLC SERPL-MCNC: 51 MG/DL
INTERPRETATION, 910389: NORMAL
LDLC SERPL CALC-MCNC: 121 MG/DL (ref 0–99)
POTASSIUM SERPL-SCNC: 4.5 MMOL/L (ref 3.5–5.2)
PROT SERPL-MCNC: 6.9 G/DL (ref 6–8.5)
SODIUM SERPL-SCNC: 138 MMOL/L (ref 134–144)
TRIGL SERPL-MCNC: 331 MG/DL (ref 0–149)
TSH SERPL DL<=0.005 MIU/L-ACNC: 4.29 UIU/ML (ref 0.45–4.5)
VLDLC SERPL CALC-MCNC: 66 MG/DL (ref 5–40)

## 2019-06-14 ENCOUNTER — OFFICE VISIT (OUTPATIENT)
Dept: CARDIOLOGY CLINIC | Age: 79
End: 2019-06-14

## 2019-06-14 VITALS
HEART RATE: 49 BPM | DIASTOLIC BLOOD PRESSURE: 64 MMHG | BODY MASS INDEX: 40.22 KG/M2 | WEIGHT: 227 LBS | OXYGEN SATURATION: 98 % | SYSTOLIC BLOOD PRESSURE: 124 MMHG | RESPIRATION RATE: 16 BRPM | HEIGHT: 63 IN

## 2019-06-14 DIAGNOSIS — E66.9 OBESITY (BMI 30-39.9): ICD-10-CM

## 2019-06-14 DIAGNOSIS — I10 ESSENTIAL HYPERTENSION: ICD-10-CM

## 2019-06-14 DIAGNOSIS — I48.0 PAROXYSMAL ATRIAL FIBRILLATION (HCC): Primary | ICD-10-CM

## 2019-06-14 RX ORDER — METOPROLOL TARTRATE 25 MG/1
12.5 TABLET, FILM COATED ORAL 2 TIMES DAILY
Qty: 90 TAB | Refills: 3
Start: 2019-06-14 | End: 2020-01-08 | Stop reason: SDUPTHER

## 2019-06-14 NOTE — PROGRESS NOTES
Reviewed record in preparation for visit and obtained necessary documentation. Verified patient with 2 identifiers     Chief Complaint   Patient presents with    Irregular Heart Beat     Annual follow up     Dizziness     for five months ending in 4/2019 was dizzy a lot on a daily basis - has not had any spells since      1. Have you been to the ER, urgent care clinic since your last visit? Hospitalized since your last visit? No     2. Have you seen or consulted any other health care providers outside of the [address] [place] [address] since your last visit? Include any pap smears or colon screening.   Yes [address] [place] [address] Oral Surgery in [place]

## 2019-06-14 NOTE — PROGRESS NOTES
Send normal/stable results letter. Your results are normal/stable. If not signed up, consider getting my chart to get your results on-line. We can help you to sign up. Liver enzymes are a little elevated.  You need to stop drinking alcohol or at a minimum reduce alcohol and repeat liver enzymes in the next few months

## 2019-06-14 NOTE — PROGRESS NOTES
Arvind Velásquez, LEANNEP-BC    Subjective/HPI:     Ms. Lorne Collier is a 70 y.o. female is here for routine f/u. She has a PMHx of PAF, HTN and HLD. She is doing well. She denies complaints of chest pains, orthopnea, PND or edema. She denies shortness of breath or palpitations. She had an episode of dizziness during April, but these have since resolved. She has noted heart rates to be lower at home, in the 50s. She denies light-headedness or syncope/near syncope. She uses a recumbent bicycle 3x/week for exercise. PCP Provider  Kayode Rao MD    Patient Active Problem List   Diagnosis Code    Abnormal heart sounds R01.2    Generalized OA M15.9    Hypothyroidism E03.9    HTN (hypertension) I10    Paroxysmal atrial fibrillation (HCC) I48.0    Obesity (BMI 30-39. 9) E66.9    Severe obesity (BMI 35.0-39. 9) E66.01     Past Surgical History:   Procedure Laterality Date    COLONOSCOPY N/A 6/6/2018    COLONOSCOPY performed by Florina Duncan MD at [facility] ENDOSCOPY    HX APPENDECTOMY  1960    HX OTHER SURGICAL Bilateral     has 2 implant removed along with a natural tooth beside it     HX TUBAL LIGATION  1969     Family History   Problem Relation Age of Onset    Heart Disease Father 79    Hypertension Father     Stroke Father     Psychiatric Disorder Sister     Cancer Brother     Psychiatric Disorder Brother      Social History     Socioeconomic History    Marital status:      Spouse name: Not on file    Number of children: Not on file    Years of education: Not on file    Highest education level: Not on file   Occupational History    Not on file   Social Needs    Financial resource strain: Not on file    Food insecurity:     Worry: Not on file     Inability: Not on file    Transportation needs:     Medical: Not on file     Non-medical: Not on file   Tobacco Use    Smoking status: Never Smoker    Smokeless tobacco: Never Used   Substance and Sexual Activity    Alcohol use: Yes     Alcohol/week: 8.4 oz     Types: 14 Glasses of wine per week     Comment: 4 maybe more    Drug use: No    Sexual activity: Yes     Partners: Male     Birth control/protection: None   Lifestyle    Physical activity:     Days per week: Not on file     Minutes per session: Not on file    Stress: Not on file   Relationships    Social connections:     Talks on phone: Not on file     Gets together: Not on file     Attends Yarsanism service: Not on file     Active member of club or organization: Not on file     Attends meetings of clubs or organizations: Not on file     Relationship status: Not on file    Intimate partner violence:     Fear of current or ex partner: Not on file     Emotionally abused: Not on file     Physically abused: Not on file     Forced sexual activity: Not on file   Other Topics Concern    Not on file   Social History Narrative    sailing       Allergies   Allergen Reactions    Lisinopril Cough        Current Outpatient Medications   Medication Sig    metoprolol tartrate (LOPRESSOR) 25 mg tablet Take 0.5 Tabs by mouth two (2) times a day.  levothyroxine (SYNTHROID) 112 mcg tablet TAKE 1 TABLET BY MOUTH ONCE DAILY BEFORE  BREAKFAST    ELIQUIS 5 mg tablet TAKE 1 TABLET BY MOUTH TWICE DAILY    Calcium Citrate-Vitamin D3 (CITRACAL + D PETITES) tablet Take 1 Tab by mouth two (2) times a day. No current facility-administered medications for this visit. I have reviewed the problem list, allergy list, medical history, family, social history and medications. Review of Symptoms:    Review of Systems   Constitutional: Negative for chills, fever and weight loss. HENT: Negative for nosebleeds. Eyes: Negative for blurred vision and double vision. Respiratory: Negative for cough, shortness of breath and wheezing. Cardiovascular: Negative for chest pain, palpitations, orthopnea, leg swelling and PND.    Gastrointestinal: Negative for abdominal pain, blood in stool, diarrhea, nausea and vomiting. Musculoskeletal: Negative for joint pain. Skin: Negative for rash. Neurological: Negative for dizziness, tingling and loss of consciousness. Endo/Heme/Allergies: Does not bruise/bleed easily. Physical Exam:      General: Well developed, in no acute distress, cooperative and alert  HEENT: No carotid bruits, no JVD, trach is midline. Neck Supple, PEERL, EOM intact. Heart:  reg rate and rhythm; normal S1/S2; no murmurs, gallops or rubs. Respiratory: Clear bilaterally x 4, no wheezing or rales  Abdomen:   Soft, non-tender, no distention, no masses. + BS. Extremities:  Normal cap refill, no cyanosis, atraumatic. No edema. Neuro: A&Ox3, speech clear, gait stable. Skin: Skin color is normal. No rashes or lesions. Non diaphoretic  Vascular: 2+ pulses symmetric in all extremities    Vitals:    06/14/19 1054 06/14/19 1057   BP: 124/66 124/64   Pulse: (!) 49    Resp: 16    SpO2: 98%    Weight: 227 lb (103 kg)    Height: 5' 3\" (1.6 m)        Cardiographics    ECG: sinus bradycardia  Results for orders placed or performed in visit on 03/15/17   CARDIAC HOLTER MONITOR, 24 HOURS    Narrative    ECG Monitor/24 hours, Complete    Reason for Holter Monitor   PALPITATIONS    Heartbeat    Slowest 47  Average 63  Fastest  104    Results:   Underlying Rhythm: Normal sinus rhythm      Atrial Arrhythmias: premature atrial contractions; occasional and 7 short runs of paroxysmal atrial fibrillation            AV Conduction: normal    Ventricular Arrhythmias: premature ventricular contractions; rare     ST Segment Analysis:non-specific changes     Symptom Correlation:  Symptoms may correspond to premature beats. Short runs of atrial fibrillation appear to have been asymptomatic.     Nina Nolan MD           Cardiology Labs:  Lab Results   Component Value Date/Time    Cholesterol, total 238 (H) 06/12/2019 03:12 PM    HDL Cholesterol 51 06/12/2019 03:12 PM    LDL, calculated 121 (H) 06/12/2019 03:12 PM    Triglyceride 331 (H) 06/12/2019 03:12 PM       Lab Results   Component Value Date/Time    Sodium 138 06/12/2019 03:12 PM    Potassium 4.5 06/12/2019 03:12 PM    Chloride 100 06/12/2019 03:12 PM    CO2 23 06/12/2019 03:12 PM    Glucose 78 06/12/2019 03:12 PM    BUN 14 06/12/2019 03:12 PM    Creatinine 0.78 06/12/2019 03:12 PM    BUN/Creatinine ratio 18 06/12/2019 03:12 PM    GFR est AA 85 06/12/2019 03:12 PM    GFR est non-AA 74 06/12/2019 03:12 PM    Calcium 10.1 06/12/2019 03:12 PM    Bilirubin, total 0.6 06/12/2019 03:12 PM    AST (SGOT) 41 (H) 06/12/2019 03:12 PM    Alk. phosphatase 82 06/12/2019 03:12 PM    Protein, total 6.9 06/12/2019 03:12 PM    Albumin 4.2 06/12/2019 03:12 PM    A-G Ratio 1.6 06/12/2019 03:12 PM    ALT (SGPT) 41 (H) 06/12/2019 03:12 PM        Orders Placed This Encounter    AMB POC EKG ROUTINE W/ 12 LEADS, INTER & REP     Order Specific Question:   Reason for Exam:     Answer:   routine    metoprolol tartrate (LOPRESSOR) 25 mg tablet     Sig: Take 0.5 Tabs by mouth two (2) times a day. Dispense:  90 Tab     Refill:  3        Assessment:     Assessment:       ICD-10-CM ICD-9-CM    1. Paroxysmal atrial fibrillation (HCC) I48.0 427.31 AMB POC EKG ROUTINE W/ 12 LEADS, INTER & REP      metoprolol tartrate (LOPRESSOR) 25 mg tablet   2. Essential hypertension I10 401.9 AMB POC EKG ROUTINE W/ 12 LEADS, INTER & REP   3. Obesity (BMI 30-39. 9) E66.9 278.00         Plan:     1. Paroxysmal atrial fibrillation (HCC)  Maintaining sinus rhythm  Echo in 3/2017 with preserved LVEF 50-55% with no valvular pathology and moderately dilated LA  Stress test in 3/2017 negative for ischemia  Bradycardic on exam today; will decrease Lopressor 12.5 mg BID. Continue Eliquis for anti-coagulation    2. Essential hypertension  BP controlled. Continue anti-hypertensive therapy and low sodium diet    3. Obesity (BMI 30-39. 9)  Encouraged calorie-restricted and exercise for weight loss      Hailee Cook, NP

## 2019-11-11 ENCOUNTER — TELEPHONE (OUTPATIENT)
Dept: CARDIOLOGY CLINIC | Age: 79
End: 2019-11-11

## 2019-11-11 NOTE — TELEPHONE ENCOUNTER
Patient want to speak with the nurse in reference to  being awaking with having A-Fib this morning and her physical therapy  wouldn't do it today because patient  Is having A-Fib. Patient stated maybe her medication might need to be readjusted . Please call patient back today ASAP.           Thanks

## 2019-11-11 NOTE — TELEPHONE ENCOUNTER
Patient awoke this am 3-4  C/O @ 6 am /78 pulse 53 heart fluttering,HA [place] and low grade temp was seen By PT /60 pulse 68 and advised to see PCP or cardiology prior to next therapy session has put a call in Dr Narciso Jc also .  New medication Doxycyline for rosacea please advise thanks

## 2019-11-12 ENCOUNTER — OFFICE VISIT (OUTPATIENT)
Dept: INTERNAL MEDICINE CLINIC | Age: 79
End: 2019-11-12

## 2019-11-12 VITALS
SYSTOLIC BLOOD PRESSURE: 134 MMHG | HEIGHT: 63 IN | DIASTOLIC BLOOD PRESSURE: 77 MMHG | RESPIRATION RATE: 16 BRPM | HEART RATE: 63 BPM | BODY MASS INDEX: 35.26 KG/M2 | TEMPERATURE: 97.7 F | OXYGEN SATURATION: 97 % | WEIGHT: 199 LBS

## 2019-11-12 DIAGNOSIS — I48.0 PAROXYSMAL ATRIAL FIBRILLATION (HCC): ICD-10-CM

## 2019-11-12 DIAGNOSIS — I10 ESSENTIAL HYPERTENSION: Primary | ICD-10-CM

## 2019-11-12 DIAGNOSIS — E03.9 HYPOTHYROIDISM, UNSPECIFIED TYPE: ICD-10-CM

## 2019-11-12 DIAGNOSIS — I49.9 IRREGULAR HEART BEAT: ICD-10-CM

## 2019-11-12 RX ORDER — DOXYCYCLINE 100 MG/1
CAPSULE ORAL
Refills: 1 | COMMUNITY
Start: 2019-10-10 | End: 2020-03-02

## 2019-11-12 RX ORDER — METRONIDAZOLE 10 MG/G
GEL TOPICAL DAILY
COMMUNITY

## 2019-11-12 NOTE — PROGRESS NOTES
Chief Complaint   Patient presents with    Irregular Heart Beat     Palpations wakes up in the middle of the night, Afib yesterday and temp yesterday     Encouraged pt to discuss pt's wishes with spouse/partner/family and bring them in the next appt to follow thru with the Advanced Directive    Health Maintenance reviewed. 1. Have you been to the ER, urgent care clinic since your last visit? Hospitalized since your last visit?no    2. Have you seen or consulted any other health care providers outside of the [address] [place] [address] [place] since your last visit? Include any pap smears or colon screening. No    Fall Risk Assessment, last 12 mths 11/12/2019   Able to walk? Yes   Fall in past 12 months? No   Fall with injury? -   Number of falls in past 12 months -   Fall Risk Score -       3 most recent PHQ Screens 11/12/2019   Little interest or pleasure in doing things Several days   Feeling down, depressed, irritable, or hopeless Several days   Total Score PHQ 2 2       Abuse Screening Questionnaire 6/12/2019   Do you ever feel afraid of your partner? N   Are you in a relationship with someone who physically or mentally threatens you? N   Is it safe for you to go home?  Y       ADL Assessment 6/12/2019   Feeding yourself No Help Needed   Getting from bed to chair No Help Needed   Getting dressed No Help Needed   Bathing or showering No Help Needed   Walk across the room (includes cane/walker) No Help Needed   Using the telphone No Help Needed   Taking your medications No Help Needed   Preparing meals No Help Needed   Managing money (expenses/bills) No Help Needed   Moderately strenuous housework (laundry) No Help Needed   Shopping for personal items (toiletries/medicines) No Help Needed   Shopping for groceries No Help Needed   Driving No Help Needed   Climbing a flight of stairs No Help Needed   Getting to places beyond walking distances No Help Needed

## 2019-11-12 NOTE — PROGRESS NOTES
HISTORY OF PRESENT ILLNESS  Serene Jara is a 70 y.o. female. Irregular Heart Beat    The history is provided by the patient. This is a recurrent problem. The current episode started more than 1 week ago (3 weeks more freq). The problem has been gradually improving. The problem occurs every several days. The problem is associated with beta blockers (takes). Associated symptoms include malaise/fatigue, irregular heartbeat and dizziness. Pertinent negatives include no diaphoresis, no exertional chest pressure, no near-syncope, no syncope and no shortness of breath. Patient Active Problem List   Diagnosis Code    Generalized OA M15.9    Hypothyroidism E03.9    HTN (hypertension) I10    Paroxysmal atrial fibrillation (HCC) I48.0    Severe obesity (BMI 35.0-39. 9) E66.01       Review of Systems   Constitutional: Positive for malaise/fatigue. Negative for diaphoresis. Respiratory: Negative for shortness of breath. Cardiovascular: Positive for palpitations. Negative for syncope and near-syncope. Neurological: Positive for dizziness. Social History     Socioeconomic History    Marital status:      Spouse name: Not on file    Number of children: Not on file    Years of education: Not on file    Highest education level: Not on file   Occupational History    Not on file   Social Needs    Financial resource strain: Not on file    Food insecurity:     Worry: Not on file     Inability: Not on file    Transportation needs:     Medical: Not on file     Non-medical: Not on file   Tobacco Use    Smoking status: Never Smoker    Smokeless tobacco: Never Used   Substance and Sexual Activity    Alcohol use:  Yes     Alcohol/week: 14.0 standard drinks     Types: 14 Glasses of wine per week     Comment: 4 maybe more    Drug use: No    Sexual activity: Yes     Partners: Male     Birth control/protection: None   Lifestyle    Physical activity:     Days per week: Not on file     Minutes per session: Not on file    Stress: Not on file   Relationships    Social connections:     Talks on phone: Not on file     Gets together: Not on file     Attends Alevism service: Not on file     Active member of club or organization: Not on file     Attends meetings of clubs or organizations: Not on file     Relationship status: Not on file    Intimate partner violence:     Fear of current or ex partner: Not on file     Emotionally abused: Not on file     Physically abused: Not on file     Forced sexual activity: Not on file   Other Topics Concern    Not on file   Social History Narrative    sailing       Physical Exam  Visit Vitals  /77 (BP 1 Location: Left arm, BP Patient Position: Sitting)   Pulse 63   Temp 97.7 °F (36.5 °C) (Oral)   Resp 16   Ht 5' 3\" (1.6 m)   Wt 199 lb (90.3 kg)   SpO2 97%   BMI 35.25 kg/m²     WD WN female NAD  Heart RRR without murmers clicks or rubs  Lungs CTA  Abdo soft nontender  Ext no edema  EKG shows sinus bradycardia, no change from previous EKGs  ASSESSMENT and PLAN  Encounter Diagnoses   Name Primary?  Essential hypertension Yes    Paroxysmal atrial fibrillation (HCC)     Hypothyroidism, unspecified type      Orders Placed This Encounter    AMB SUPPLY ORDER    REFERRAL TO CARDIOLOGY    AMB POC EKG ROUTINE W/ 12 LEADS, INTER & REP    doxycycline (VIBRAMYCIN) 100 mg capsule    metroNIDAZOLE (METROGEL) 1 % topical gel     We will do a 72-hour Holter on her, she can follow-up with cardiology to discuss further  Euthyroid hypertension good  Follow-up and Dispositions    · Return in about 6 weeks (around 12/24/2019).

## 2019-11-13 NOTE — TELEPHONE ENCOUNTER
Message   Received: Today   Message Contents   Heidi Bunn, NP  Dung Baum LPN   Caller: Unspecified (2 days ago, 11:08 AM)             Dr. Dennise Bledsoe ordered a holter, so let's keep that   F/u in 2 weeks after holter monitor is complete to discuss results     Thanks          Called pt and left message on phone stating that since her PCP ordered the monitor to document any symptoms on that. Advised  wants her to f/u in 2 weeks after her monitor is done to go over the results and what to do next. Advised I would have our  call to get there set with that appt. Advised to call if needed. Pt has appt on 11/22/19 at [facility] office with . That will be okay as she has 72 hour monitor on.

## 2019-11-17 DIAGNOSIS — I48.0 PAROXYSMAL ATRIAL FIBRILLATION (HCC): ICD-10-CM

## 2019-11-17 RX ORDER — APIXABAN 5 MG/1
TABLET, FILM COATED ORAL
Qty: 60 TAB | Refills: 5 | Status: SHIPPED | OUTPATIENT
Start: 2019-11-17 | End: 2020-01-27 | Stop reason: SDUPTHER

## 2019-11-19 ENCOUNTER — HOSPITAL ENCOUNTER (OUTPATIENT)
Dept: NON INVASIVE DIAGNOSTICS | Age: 79
Discharge: HOME | End: 2019-11-19
Attending: FAMILY MEDICINE
Payer: MEDICARE

## 2019-11-19 DIAGNOSIS — I49.9 IRREGULAR HEART BEAT: ICD-10-CM

## 2019-11-19 DIAGNOSIS — I48.0 PAROXYSMAL ATRIAL FIBRILLATION (HCC): ICD-10-CM

## 2019-11-19 PROCEDURE — 93225 XTRNL ECG REC<48 HRS REC: CPT

## 2019-12-23 ENCOUNTER — OFFICE VISIT (OUTPATIENT)
Dept: INTERNAL MEDICINE CLINIC | Age: 79
End: 2019-12-23

## 2019-12-23 VITALS
DIASTOLIC BLOOD PRESSURE: 82 MMHG | OXYGEN SATURATION: 95 % | RESPIRATION RATE: 16 BRPM | HEIGHT: 63 IN | BODY MASS INDEX: 39.87 KG/M2 | TEMPERATURE: 96.5 F | SYSTOLIC BLOOD PRESSURE: 135 MMHG | HEART RATE: 57 BPM | WEIGHT: 225 LBS

## 2019-12-23 DIAGNOSIS — L71.9 ROSACEA: ICD-10-CM

## 2019-12-23 DIAGNOSIS — I48.0 PAROXYSMAL ATRIAL FIBRILLATION (HCC): ICD-10-CM

## 2019-12-23 DIAGNOSIS — E78.00 HYPERCHOLESTEROLEMIA: ICD-10-CM

## 2019-12-23 DIAGNOSIS — E03.9 HYPOTHYROIDISM, UNSPECIFIED TYPE: ICD-10-CM

## 2019-12-23 DIAGNOSIS — I10 ESSENTIAL HYPERTENSION: Primary | ICD-10-CM

## 2019-12-23 DIAGNOSIS — R00.2 PALPITATION: ICD-10-CM

## 2019-12-23 RX ORDER — ATORVASTATIN CALCIUM 20 MG/1
20 TABLET, FILM COATED ORAL DAILY
Qty: 30 TAB | Refills: 5 | Status: SHIPPED | OUTPATIENT
Start: 2019-12-23 | End: 2020-01-07 | Stop reason: SINTOL

## 2019-12-23 NOTE — PATIENT INSTRUCTIONS

## 2019-12-23 NOTE — PROGRESS NOTES
Subjective:     Philip Martínez is a 70 y.o. female who presents for follow up of hypertension. A.f ib    New concerns: f/u of traci erecent palpitations which are doing better, Had holter re re no serious arrhythmia noted. Takes BT no bleeding issues, takes AB fro rosacea, Some pain issues c/o some arthritis. Energy OK Wt stable. last TSH was nl range, takes thyroid pill. Current Outpatient Medications   Medication Sig Dispense Refill    ELIQUIS 5 mg tablet TAKE 1 TABLET BY MOUTH TWICE DAILY 60 Tab 5    doxycycline (VIBRAMYCIN) 100 mg capsule TAKE 1 CAPSULE BY MOUTH TWICE DAILY WITH FOOD  1    metroNIDAZOLE (METROGEL) 1 % topical gel Apply  to affected area daily. Use a thin layer to affected areas after washing      metoprolol tartrate (LOPRESSOR) 25 mg tablet Take 0.5 Tabs by mouth two (2) times a day. 90 Tab 3    levothyroxine (SYNTHROID) 112 mcg tablet TAKE 1 TABLET BY MOUTH ONCE DAILY BEFORE  BREAKFAST 90 Tab 1    Calcium Citrate-Vitamin D3 (CITRACAL + D PETITES) tablet Take 1 Tab by mouth two (2) times a day. 100 Tab 5     Allergies   Allergen Reactions    Lisinopril Cough       Diet and Lifestyle: nonsmoker    Cardiovascular ROS: taking medications as instructed, no medication side effects noted, no TIA's, no chest pain on exertion, no dyspnea on exertion, no swelling of ankles. Review of Systems, additional:  Pertinent items are noted in HPI. C/o Some muscle pains    Patient Active Problem List    Diagnosis Date Noted    Hypercholesterolemia 12/23/2019    Severe obesity (BMI 35.0-39.9) 05/31/2018    Paroxysmal atrial fibrillation ([address] 03/20/2017    Hypothyroidism 09/10/2014    HTN (hypertension) 09/10/2014    Generalized OA 09/05/2014       Allergies   Allergen Reactions    Lisinopril Cough     Social History     Tobacco Use    Smoking status: Never Smoker    Smokeless tobacco: Never Used   Substance Use Topics    Alcohol use:  Yes     Alcohol/week: 14.0 standard drinks     Types: 14 Glasses of wine per week     Comment: 4 maybe more        Lab Results   Component Value Date/Time    WBC 4.7 03/02/2017 10:03 AM    HGB 15.4 03/02/2017 10:03 AM    HCT 45.3 03/02/2017 10:03 AM    PLATELET 200 78/36/8874 10:03 AM    MCV 93 03/02/2017 10:03 AM     Lab Results   Component Value Date/Time    Cholesterol, total 238 (H) 06/12/2019 03:12 PM    HDL Cholesterol 51 06/12/2019 03:12 PM    LDL, calculated 121 (H) 06/12/2019 03:12 PM    Triglyceride 331 (H) 06/12/2019 03:12 PM     Lab Results   Component Value Date/Time    ALT (SGPT) 41 (H) 06/12/2019 03:12 PM    AST (SGOT) 41 (H) 06/12/2019 03:12 PM    Alk. phosphatase 82 06/12/2019 03:12 PM    Bilirubin, direct 0.20 03/22/2018 10:04 AM    Bilirubin, total 0.6 06/12/2019 03:12 PM    Albumin 4.2 06/12/2019 03:12 PM    Protein, total 6.9 06/12/2019 03:12 PM    PLATELET 932 81/82/3823 10:03 AM     Lab Results   Component Value Date/Time    GFR est non-AA 74 06/12/2019 03:12 PM    GFR est AA 85 06/12/2019 03:12 PM    Creatinine 0.78 06/12/2019 03:12 PM    BUN 14 06/12/2019 03:12 PM    Sodium 138 06/12/2019 03:12 PM    Potassium 4.5 06/12/2019 03:12 PM    Chloride 100 06/12/2019 03:12 PM    CO2 23 06/12/2019 03:12 PM     Lab Results   Component Value Date/Time    TSH 4.290 06/12/2019 03:12 PM    T3 Uptake 32 07/17/2014 11:33 AM    T4, Total 10.0 07/17/2014 11:33 AM      Lab Results   Component Value Date/Time    Glucose 78 06/12/2019 03:12 PM             Objective:     Physical exam significant for the following:     Mild rosacea    Visit Vitals  /82 (BP 1 Location: Left arm, BP Patient Position: Sitting)   Pulse (!) 57   Temp 96.5 °F (35.8 °C) (Temporal)   Resp 16   Ht 5' 3\" (1.6 m)   Wt 225 lb (102.1 kg)   SpO2 95%   BMI 39.86 kg/m²     Appearance: alert, well appearing, and in no distress. General exam: CVS exam BP noted to be well controlled today in office, S1, S2 normal, no gallop, no murmur, chest clear, no JVD, no HSM, no edema. Abhishek Rein Assessment/Plan:     hypertension well controlled, stable, hyperlipidemia not well controlled, but stable      ICD-10-CM ICD-9-CM    1. Essential hypertension T48 587.9 METABOLIC PANEL, COMPREHENSIVE   2. Palpitation R00.2 785.1    3. Rosacea L71.9 695.3    4. Paroxysmal atrial fibrillation (HCC) I48.0 427.31    5. Hypercholesterolemia E78.00 272.0 atorvastatin (LIPITOR) 20 mg tablet      LIPID PANEL      CK     Calc CVA risk =>7.5%, we disc this she agrees to start statin    Discussed possible side affects, precautions, and drug interactions and possible benefits of the medication(s). Orders Placed This Encounter    LIPID PANEL     Standing Status:   Future     Standing Expiration Date:   4/53/6669    METABOLIC PANEL, COMPREHENSIVE     Standing Status:   Future     Standing Expiration Date:   6/24/2020    CK     Standing Status:   Future     Standing Expiration Date:   6/23/2020    atorvastatin (LIPITOR) 20 mg tablet     Sig: Take 1 Tab by mouth daily. Dispense:  30 Tab     Refill:  5       Follow-up and Dispositions    · Return in about 2 months (around 2/23/2020) for routine follow up.

## 2019-12-31 DIAGNOSIS — E03.9 HYPOTHYROIDISM, UNSPECIFIED TYPE: ICD-10-CM

## 2019-12-31 RX ORDER — LEVOTHYROXINE SODIUM 112 UG/1
TABLET ORAL
Qty: 90 TAB | Refills: 1 | Status: SHIPPED | OUTPATIENT
Start: 2019-12-31 | End: 2020-01-07 | Stop reason: SDUPTHER

## 2020-01-01 LAB
ALBUMIN SERPL-MCNC: 4.1 G/DL (ref 3.5–4.8)
ALBUMIN/GLOB SERPL: 1.6 {RATIO} (ref 1.2–2.2)
ALP SERPL-CCNC: 75 IU/L (ref 39–117)
ALT SERPL-CCNC: 83 IU/L (ref 0–32)
AST SERPL-CCNC: 84 IU/L (ref 0–40)
BILIRUB SERPL-MCNC: 0.6 MG/DL (ref 0–1.2)
BUN SERPL-MCNC: 18 MG/DL (ref 8–27)
BUN/CREAT SERPL: 23 (ref 12–28)
CALCIUM SERPL-MCNC: 9.8 MG/DL (ref 8.7–10.3)
CHLORIDE SERPL-SCNC: 101 MMOL/L (ref 96–106)
CHOLEST SERPL-MCNC: 205 MG/DL (ref 100–199)
CK SERPL-CCNC: 44 U/L (ref 24–173)
CO2 SERPL-SCNC: 26 MMOL/L (ref 20–29)
CREAT SERPL-MCNC: 0.78 MG/DL (ref 0.57–1)
GLOBULIN SER CALC-MCNC: 2.6 G/DL (ref 1.5–4.5)
GLUCOSE SERPL-MCNC: 94 MG/DL (ref 65–99)
HDLC SERPL-MCNC: 52 MG/DL
INTERPRETATION, 910389: NORMAL
LDLC SERPL CALC-MCNC: 112 MG/DL (ref 0–99)
POTASSIUM SERPL-SCNC: 4.4 MMOL/L (ref 3.5–5.2)
PROT SERPL-MCNC: 6.7 G/DL (ref 6–8.5)
SODIUM SERPL-SCNC: 139 MMOL/L (ref 134–144)
TRIGL SERPL-MCNC: 207 MG/DL (ref 0–149)
VLDLC SERPL CALC-MCNC: 41 MG/DL (ref 5–40)

## 2020-01-06 ENCOUNTER — TELEPHONE (OUTPATIENT)
Dept: INTERNAL MEDICINE CLINIC | Age: 80
End: 2020-01-06

## 2020-01-07 DIAGNOSIS — E03.9 HYPOTHYROIDISM, UNSPECIFIED TYPE: ICD-10-CM

## 2020-01-07 DIAGNOSIS — R74.8 ELEVATED LIVER ENZYMES: Primary | ICD-10-CM

## 2020-01-07 RX ORDER — LEVOTHYROXINE SODIUM 112 UG/1
112 TABLET ORAL
Qty: 90 TAB | Refills: 3 | Status: SHIPPED | OUTPATIENT
Start: 2020-01-07 | End: 2020-07-09 | Stop reason: SDUPTHER

## 2020-01-07 NOTE — TELEPHONE ENCOUNTER
Requested Prescriptions     Pending Prescriptions Disp Refills    levothyroxine (SYNTHROID) 112 mcg tablet 90 Tab 1       Different pharmacy

## 2020-01-07 NOTE — TELEPHONE ENCOUNTER
Last office visit:  12/23/2019  Last filled:  Synthroid 112mcg one anderson #90 X 1 refill   No changes  Follow up 2/24/2020

## 2020-01-08 DIAGNOSIS — I48.0 PAROXYSMAL ATRIAL FIBRILLATION (HCC): ICD-10-CM

## 2020-01-08 RX ORDER — METOPROLOL TARTRATE 25 MG/1
12.5 TABLET, FILM COATED ORAL 2 TIMES DAILY
Qty: 45 TAB | Refills: 3 | Status: SHIPPED | OUTPATIENT
Start: 2020-01-08

## 2020-01-10 ENCOUNTER — HOSPITAL ENCOUNTER (OUTPATIENT)
Dept: ULTRASOUND IMAGING | Age: 80
Discharge: HOME | End: 2020-01-10
Attending: FAMILY MEDICINE
Payer: MEDICARE

## 2020-01-10 DIAGNOSIS — R74.8 ELEVATED LIVER ENZYMES: ICD-10-CM

## 2020-01-10 PROCEDURE — 76705 ECHO EXAM OF ABDOMEN: CPT

## 2020-01-10 NOTE — PROGRESS NOTES
Send normal/stable results letter. Your results are normal/stable. If not signed up, consider getting my chart to get your results on-line. We can help you to sign up. Ultrasound is consistent with fatty liver.

## 2020-01-16 DIAGNOSIS — E03.9 HYPOTHYROIDISM, UNSPECIFIED TYPE: ICD-10-CM

## 2020-01-16 RX ORDER — LEVOTHYROXINE SODIUM 112 UG/1
112 TABLET ORAL
Qty: 90 TAB | Refills: 3 | Status: CANCELLED | OUTPATIENT
Start: 2020-01-16

## 2020-01-16 NOTE — TELEPHONE ENCOUNTER
Requested Prescriptions     Pending Prescriptions Disp Refills    levothyroxine (SYNTHROID) 112 mcg tablet 90 Tab 3     Sig: Take 1 Tab by mouth Daily (before breakfast).

## 2020-01-27 DIAGNOSIS — I48.0 PAROXYSMAL ATRIAL FIBRILLATION (HCC): ICD-10-CM

## 2020-02-24 ENCOUNTER — OFFICE VISIT (OUTPATIENT)
Dept: INTERNAL MEDICINE CLINIC | Age: 80
End: 2020-02-24

## 2020-02-24 VITALS
BODY MASS INDEX: 38.62 KG/M2 | OXYGEN SATURATION: 96 % | TEMPERATURE: 95.9 F | DIASTOLIC BLOOD PRESSURE: 79 MMHG | HEIGHT: 63 IN | HEART RATE: 60 BPM | SYSTOLIC BLOOD PRESSURE: 129 MMHG | RESPIRATION RATE: 16 BRPM | WEIGHT: 218 LBS

## 2020-02-24 DIAGNOSIS — I48.0 PAROXYSMAL ATRIAL FIBRILLATION (HCC): ICD-10-CM

## 2020-02-24 DIAGNOSIS — K75.9 HEPATITIS: Primary | ICD-10-CM

## 2020-02-24 DIAGNOSIS — E78.00 HYPERCHOLESTEROLEMIA: ICD-10-CM

## 2020-02-24 DIAGNOSIS — E03.9 HYPOTHYROIDISM, UNSPECIFIED TYPE: ICD-10-CM

## 2020-02-24 DIAGNOSIS — M19.90 ARTHRITIS: ICD-10-CM

## 2020-02-24 DIAGNOSIS — M15.9 GENERALIZED OA: ICD-10-CM

## 2020-02-24 DIAGNOSIS — I10 ESSENTIAL HYPERTENSION: ICD-10-CM

## 2020-02-24 DIAGNOSIS — E66.01 SEVERE OBESITY WITH BODY MASS INDEX (BMI) OF 35.0 TO 39.9 WITH SERIOUS COMORBIDITY (HCC): ICD-10-CM

## 2020-02-24 DIAGNOSIS — R68.2 DRY MOUTH: ICD-10-CM

## 2020-02-24 RX ORDER — CYCLOBENZAPRINE HCL 5 MG
5 TABLET ORAL
Qty: 60 TAB | Refills: 0 | Status: SHIPPED | OUTPATIENT
Start: 2020-02-24 | End: 2020-07-09 | Stop reason: ALTCHOICE

## 2020-02-24 NOTE — PROGRESS NOTES
Chief Complaint   Patient presents with    Shoulder Pain     pain L/R shoulders, hips     I have reviewed the patient's medical history in detail and updated the computerized patient record. Health Maintenance reviewed. 1. Have you been to the ER, urgent care clinic since your last visit? Hospitalized since your last visit?no    2. Have you seen or consulted any other health care providers outside of the [address] [place] [address] Gustavo since your last visit? Include any pap smears or colon screening. No    .Pt stated he/she does have an Advance Directive    Fall Risk Assessment, last 12 mths 2/24/2020   Able to walk? Yes   Fall in past 12 months? No   Fall with injury? -   Number of falls in past 12 months -   Fall Risk Score -       3 most recent PHQ Screens 2/24/2020   Little interest or pleasure in doing things Several days   Feeling down, depressed, irritable, or hopeless Several days   Total Score PHQ 2 2       Abuse Screening Questionnaire 2/24/2020   Do you ever feel afraid of your partner? N   Are you in a relationship with someone who physically or mentally threatens you? N   Is it safe for you to go home?  Y       ADL Assessment 2/24/2020   Feeding yourself No Help Needed   Getting from bed to chair No Help Needed   Getting dressed No Help Needed   Bathing or showering No Help Needed   Walk across the room (includes cane/walker) No Help Needed   Using the telphone No Help Needed   Taking your medications No Help Needed   Preparing meals No Help Needed   Managing money (expenses/bills) No Help Needed   Moderately strenuous housework (laundry) No Help Needed   Shopping for personal items (toiletries/medicines) No Help Needed   Shopping for groceries No Help Needed   Driving No Help Needed   Climbing a flight of stairs No Help Needed   Getting to places beyond walking distances No Help Needed

## 2020-02-24 NOTE — PROGRESS NOTES
HISTORY OF PRESENT ILLNESS  Naomi Owusu is a 70 y.o. female. Somewhat generalized pain x mo getting worse. F/ Inc LFT's. Had [address] showed some fatty liver but suggested further studies. Has mildly elevated LFT's She following diet. Has lost weight. Shoulder Pain    The history is provided by the patient and spouse. The incident occurred more than 1 week ago (2-3 weeks). There was no injury mechanism. Both shoulders are affected. The pain is moderate. The pain has been fluctuating since onset. There is no history of shoulder injury. She has other injuries (hips buttockother shoulder aand back elbows). There is no history of shoulder surgery. Patient Active Problem List   Diagnosis Code    Generalized OA M15.9    Hypothyroidism E03.9    HTN (hypertension) I10    Paroxysmal atrial fibrillation (HCC) I48.0    Severe obesity with body mass index (BMI) of 35.0 to 39.9 with serious comorbidity (HCC) E66.01    Hypercholesterolemia E78.00       ROS  Reports taking blood pressure medications without side affects. No complaints of exertional chest pain, excessive shortness of breath or focal weakness. Minimal swelling in lower legs or dizziness with standing. Physical Exam  Visit Vitals  /79 (BP 1 Location: Left arm, BP Patient Position: Sitting)   Pulse 60   Temp 95.9 °F (35.5 °C) (Temporal)   Resp 16   Ht 5' 3\" (1.6 m)   Wt 218 lb (98.9 kg)   SpO2 96%   BMI 38.62 kg/m²     WD WN female NAD  Heart irreg irreg rate controld without murmers clicks or rubs  Lungs CTA  Abdo soft nontender  Ext no edema    ASSESSMENT and PLAN  Encounter Diagnoses   Name Primary?     Hepatitis Yes    Arthritis     Essential hypertension     Paroxysmal atrial fibrillation (HCC)     Hypercholesterolemia     Hypothyroidism, unspecified type     Generalized OA     Severe obesity with body mass index (BMI) of 35.0 to 39.9 with serious comorbidity (HCC)     Dry mouth      Orders Placed This Encounter    AMB SUPPLY ORDER  METABOLIC PANEL, COMPREHENSIVE    CBC W/O DIFF    RA + CCP ABS    FRANCESCA BY MULTIPLEX FLOW IA, QL    ACTIN (SMOOTH MUSCLE) ANTIBODY    SOLUBLE LIVER ANTIGEN AB    TSH 3RD GENERATION    SJOGREN'S ABS, SSA AND SSB    cyclobenzaprine (FLEXERIL) 5 mg tablet     Labs as above to eval liver and her joint pain c/o. Trial of MR  Labs to eval thyroid and the LFT's again may nl with [facility]. Cont diet  Follow-up and Dispositions    · Return in about 4 weeks (around 3/23/2020) for routine follow up.

## 2020-02-27 LAB
ALBUMIN SERPL-MCNC: 4.1 G/DL (ref 3.7–4.7)
ALBUMIN/GLOB SERPL: 1.5 {RATIO} (ref 1.2–2.2)
ALP SERPL-CCNC: 84 IU/L (ref 39–117)
ALT SERPL-CCNC: 43 IU/L (ref 0–32)
ANA SER QL: NEGATIVE
AST SERPL-CCNC: 42 IU/L (ref 0–40)
BILIRUB SERPL-MCNC: 0.5 MG/DL (ref 0–1.2)
BUN SERPL-MCNC: 14 MG/DL (ref 8–27)
BUN/CREAT SERPL: 18 (ref 12–28)
CALCIUM SERPL-MCNC: 10.1 MG/DL (ref 8.7–10.3)
CCP IGA+IGG SERPL IA-ACNC: 10 UNITS (ref 0–19)
CHLORIDE SERPL-SCNC: 100 MMOL/L (ref 96–106)
CO2 SERPL-SCNC: 27 MMOL/L (ref 20–29)
CREAT SERPL-MCNC: 0.77 MG/DL (ref 0.57–1)
ENA SS-A AB SER-ACNC: <0.2 AI (ref 0–0.9)
ENA SS-B AB SER-ACNC: <0.2 AI (ref 0–0.9)
ERYTHROCYTE [DISTWIDTH] IN BLOOD BY AUTOMATED COUNT: 12.6 % (ref 11.7–15.4)
GLOBULIN SER CALC-MCNC: 2.7 G/DL (ref 1.5–4.5)
GLUCOSE SERPL-MCNC: 97 MG/DL (ref 65–99)
HCT VFR BLD AUTO: 45.6 % (ref 34–46.6)
HGB BLD-MCNC: 15.3 G/DL (ref 11.1–15.9)
MCH RBC QN AUTO: 33.2 PG (ref 26.6–33)
MCHC RBC AUTO-ENTMCNC: 33.6 G/DL (ref 31.5–35.7)
MCV RBC AUTO: 99 FL (ref 79–97)
PLATELET # BLD AUTO: 247 X10E3/UL (ref 150–450)
POTASSIUM SERPL-SCNC: 4.6 MMOL/L (ref 3.5–5.2)
PROT SERPL-MCNC: 6.8 G/DL (ref 6–8.5)
RBC # BLD AUTO: 4.61 X10E6/UL (ref 3.77–5.28)
RHEUMATOID FACT SERPL-ACNC: <10 IU/ML (ref 0–13.9)
SODIUM SERPL-SCNC: 139 MMOL/L (ref 134–144)
SOLUBLE LIVER IGG SER IA-ACNC: 0.7 UNITS (ref 0–20)
TSH SERPL DL<=0.005 MIU/L-ACNC: 4.66 UIU/ML (ref 0.45–4.5)
WBC # BLD AUTO: 6.5 X10E3/UL (ref 3.4–10.8)

## 2020-02-28 ENCOUNTER — TELEPHONE (OUTPATIENT)
Dept: INTERNAL MEDICINE CLINIC | Age: 80
End: 2020-02-28

## 2020-02-28 NOTE — TELEPHONE ENCOUNTER
Stated it feels like she has shingles. Burns over shoulders. Starts in spine and goes down her arms. Thumb and first finger numb. Hips hurt. Was recently seen by Dr. Cj Robbins in office on 2/24/20. Advised her that she should try Cyclobenzaprine. Stated it will come in the mail today from [facility] [employer]. Stated she was taking her thyroid med with calcium. TSH  mildly abnormal on recent labs. Informed pt to take separately from thyroid med and follow up in 2-3 months for repeat labs. Informed to go to ER if s/s worsen.

## 2020-02-28 NOTE — TELEPHONE ENCOUNTER
Pt called in reference to wanting to speak with Randa about her pain. She said she would like to speak with the provider to see if she needs to go to the ER. Please call her at [phone].

## 2020-02-28 NOTE — PROGRESS NOTES
With your weight loss, your liver enzymes have improved. They are still a little elevated but better. Keep up the good work! Keep losing weight!   No sign of autoimmune disease

## 2020-03-02 ENCOUNTER — OFFICE VISIT (OUTPATIENT)
Dept: INTERNAL MEDICINE CLINIC | Age: 80
End: 2020-03-02

## 2020-03-02 VITALS
OXYGEN SATURATION: 96 % | DIASTOLIC BLOOD PRESSURE: 79 MMHG | TEMPERATURE: 97.4 F | HEART RATE: 59 BPM | SYSTOLIC BLOOD PRESSURE: 126 MMHG | HEIGHT: 63 IN | WEIGHT: 219 LBS | RESPIRATION RATE: 20 BRPM | BODY MASS INDEX: 38.8 KG/M2

## 2020-03-02 DIAGNOSIS — I48.91 UNSPECIFIED ATRIAL FIBRILLATION (HCC): ICD-10-CM

## 2020-03-02 DIAGNOSIS — M19.90 OSTEOARTHRITIS, UNSPECIFIED OSTEOARTHRITIS TYPE, UNSPECIFIED SITE: Primary | ICD-10-CM

## 2020-03-02 DIAGNOSIS — M35.3 POLYMYALGIA RHEUMATICA (HCC): ICD-10-CM

## 2020-03-02 RX ORDER — PREDNISOLONE 15 MG/5ML
15 SOLUTION ORAL 2 TIMES DAILY
Qty: 210 ML | Refills: 0 | Status: SHIPPED | OUTPATIENT
Start: 2020-03-02 | End: 2020-03-09 | Stop reason: SDUPTHER

## 2020-03-02 RX ORDER — PREGABALIN 50 MG/1
50 CAPSULE ORAL 3 TIMES DAILY
Qty: 21 CAP | Refills: 0 | Status: SHIPPED | OUTPATIENT
Start: 2020-03-02 | End: 2020-03-09 | Stop reason: SDUPTHER

## 2020-03-02 RX ORDER — PREGABALIN 50 MG/1
50 CAPSULE ORAL 3 TIMES DAILY
Qty: 21 CAP | Refills: 0 | Status: SHIPPED | OUTPATIENT
Start: 2020-03-02 | End: 2020-03-02

## 2020-03-02 RX ORDER — PREDNISOLONE 15 MG/5ML
15 SOLUTION ORAL 2 TIMES DAILY
Qty: 210 ML | Refills: 0 | Status: SHIPPED | OUTPATIENT
Start: 2020-03-02 | End: 2020-03-02

## 2020-03-02 NOTE — PROGRESS NOTES
Chief Complaint   Patient presents with    Back Pain     upper back into shoulders / lower back - numbness in right hand and fingers

## 2020-03-02 NOTE — PROGRESS NOTES
Subjective: (As above and below)     Chief Complaint   Patient presents with    Back Pain     upper back into shoulders / lower back - numbness in right hand and fingers      She is a 70y.o. year old female who presents for evaluation. Complaining of muscle pain in upper back, shoulders, hips and right radiculopathy in her arms and in fingers. Stated she had massages but they did not help. Came to Dr. Mady Boyce last week. Ordered autoimmune panel. Ordered PT- has not started yet. Wants a diagnosis first prior to going. Ordered Cyclobenzaprine. Stated it did not do anything but made her sleepy and weak. Pain excruciating when lying down or tries to sleep. Stated pain is severe in her shoulders and spine. Had shingles in past. Stated pain is similar although pain now is slightly burning. Had trigger finger in past in left hand. Unable to dress self. Liver enzymes were elevated- tried to lose weight and has lost 7 pounds. Takes calcium with thyroid and has stopped doing that. Complained of new onset headache- up neck and over head and to right to ear. Started Saturday February 28, 2020. No meds have helped  Denied any tick bites or recent travel. Was on statin but due to increased liver enzymes Dr. Mady Boyce said not to take it. HPI  O: 3 1/2 weeks ago  L:shoulders, back and hips  D:5 years  C: numbness on right side  A: lying down worsens it  R: took alleve and tylenol arthritis and flexaril  T: all the time, increases when she lies down  S:9/10    Reviewed PmHx, RxHx, FmHx, SocHx, AllgHx and updated in chart.   Patient Active Problem List   Diagnosis Code    Generalized OA M15.9    Hypothyroidism E03.9    HTN (hypertension) I10    Paroxysmal atrial fibrillation (HCC) I48.0    Severe obesity with body mass index (BMI) of 35.0 to 39.9 with serious comorbidity (HCC) E66.01    Hypercholesterolemia E78.00     Review of Systems:  Gen: + fatigue, fever, chills  Eyes: no excessive tearing, itching, or discharge  Nose: no rhinorrhea, no sinus pain, nasal congestion  Mouth: no oral lesions, no sore throat  Resp: no shortness of breath, no wheezing, + cough  CV: no chest pain, no paroxysmal nocturnal dyspnea  Abd: no nausea, no heartburn, no diarrhea, no constipation, no abdominal pain  Neuro: + headaches, no syncope or presyncopal episodes  Endo: no polyuria, no polydipsia  Heme: no lymphadenopathy, no easy bruising or bleeding    Allergies   Allergen Reactions    Lisinopril Cough     Current Outpatient Medications   Medication Sig    cyclobenzaprine (FLEXERIL) 5 mg tablet Take 1 Tab by mouth three (3) times daily as needed for Muscle Spasm(s).  apixaban (ELIQUIS) 5 mg tablet TAKE 1 TABLET BY MOUTH TWICE DAILY    metoprolol tartrate (LOPRESSOR) 25 mg tablet Take 0.5 Tabs by mouth two (2) times a day.  levothyroxine (SYNTHROID) 112 mcg tablet Take 1 Tab by mouth Daily (before breakfast).  metroNIDAZOLE (METROGEL) 1 % topical gel Apply  to affected area daily. Use a thin layer to affected areas after washing    Calcium Citrate-Vitamin D3 (CITRACAL + D PETITES) tablet Take 1 Tab by mouth two (2) times a day.  doxycycline (VIBRAMYCIN) 100 mg capsule TAKE 1 CAPSULE BY MOUTH TWICE DAILY WITH FOOD     No current facility-administered medications for this visit.       Objective:     Visit Vitals  /79 (BP 1 Location: Left arm, BP Patient Position: At rest)   Pulse (!) 59   Temp 97.4 °F (36.3 °C) (Oral)   Resp 20   Ht 5' 3\" (1.6 m)   Wt 219 lb (99.3 kg)   SpO2 96%   BMI 38.79 kg/m²      Physical Examination:   Gen: alert, oriented, no acute distress  Head: normocephalic, atraumatic  Ears: external auditory canals clear, TMs without erythema or effusion  Eyes: pupils equal round reactive to light, sclera clear, conjunctiva clear  Nose: normal turbinates, no rhinorrhea  Oral: moist mucus membranes, no oral lesions, no pharyngeal inflammation or exudate  Neck: supple, no lymphadenopathy  Resp: no increased work of breathing, lungs clear to ausculation bilaterally  CV: S1, S2 normal, no murmurs, rubs, or gallops. Abd: soft, not tender, not distended. No hepatosplenomegaly. Normal bowel sounds. No hernias. Neuro: cranial nerves intact, diminished  strength and movement in all extremities, reflexes and sensation intact and symmetric. Skin: no lesion or rash    Assessment/ Plan:        ICD-10-CM ICD-9-CM    1. Osteoarthritis, unspecified osteoarthritis type, unspecified site M19.90 715.90 SED RATE (ESR)      CRP, HIGH SENSITIVITY      DISCONTINUED: pregabalin (LYRICA) 50 mg capsule      DISCONTINUED: prednisoLONE (PRELONE) 15 mg/5 mL syrup   2. Polymyalgia rheumatica (HCC) M35.3 725 SED RATE (ESR)      CRP, HIGH SENSITIVITY      prednisoLONE (PRELONE) 15 mg/5 mL syrup      pregabalin (LYRICA) 50 mg capsule      DISCONTINUED: pregabalin (LYRICA) 50 mg capsule      DISCONTINUED: prednisoLONE (PRELONE) 15 mg/5 mL syrup   3. Unspecified atrial fibrillation (HCC)  I48.91 427.31 CRP, HIGH SENSITIVITY     1. Osteoarthritis, unspecified osteoarthritis type, unspecified site  Pending  - SED RATE (ESR); Future  - CRP, HIGH SENSITIVITY; Future    2. Polymyalgia rheumatica (HCC)  Pending  - SED RATE (ESR); Future  - CRP, HIGH SENSITIVITY; Future  - prednisoLONE (PRELONE) 15 mg/5 mL syrup; Take 15 mL by mouth two (2) times a day for 7 days. Dispense: 210 mL; Refill: 0  - pregabalin (LYRICA) 50 mg capsule; Take 1 Cap by mouth three (3) times daily for 7 days. Max Daily Amount: 150 mg. Dispense: 21 Cap; Refill: 0    3. Unspecified atrial fibrillation (HCC)   Pending  - CRP, HIGH SENSITIVITY; Future    Due to her debilitating generalized discomfort, mainly in shoulders, hips, knees, think this patient could have Polymyalgia Rheumatica? Will get some labs to rule out. Advised her to take the Prednisolone and meds as directed. Follow up in one week.      I have discussed the diagnosis with the patient and the intended plan as seen in the above orders. The patient has received an after-visit summary and questions were answered concerning future plans. If symptoms worsen, go to the ER.     Medication Side Effects and Warnings were discussed with patient: yes  Patient Labs were reviewed: yes  Patient Past Records were reviewed:  yes    Aaron Freitas NP

## 2020-03-02 NOTE — PATIENT INSTRUCTIONS
Arthritis: Care Instructions  Your Care Instructions  Arthritis, also called osteoarthritis, is a breakdown of the cartilage that cushions your joints. When the cartilage wears down, your bones rub against each other. This causes pain and stiffness. Many people have some arthritis as they age. Arthritis most often affects the joints of the spine, hands, hips, knees, or feet. You can take simple measures to protect your joints, ease your pain, and help you stay active. Follow-up care is a key part of your treatment and safety. Be sure to make and go to all appointments, and call your doctor if you are having problems. It's also a good idea to know your test results and keep a list of the medicines you take. How can you care for yourself at home? · Stay at a healthy weight. Being overweight puts extra strain on your joints. · Talk to your doctor or physical therapist about exercises that will help ease joint pain. ? Stretch. You may enjoy gentle forms of yoga to help keep your joints and muscles flexible. ? Walk instead of jog. Other types of exercise that are less stressful on the joints include riding a bicycle, swimming, marti chi, or water exercise. ? Lift weights. Strong muscles help reduce stress on your joints. Stronger thigh muscles, for example, take some of the stress off of the knees and hips. Learn the right way to lift weights so you do not make joint pain worse. · Take your medicines exactly as prescribed. Call your doctor if you think you are having a problem with your medicine. · Take pain medicines exactly as directed. ? If the doctor gave you a prescription medicine for pain, take it as prescribed. ? If you are not taking a prescription pain medicine, ask your doctor if you can take an over-the-counter medicine. · Use a cane, crutch, walker, or another device if you need help to get around. These can help rest your joints.  You also can use other things to make life easier, such as a higher toilet seat and padded handles on kitchen utensils. · Do not sit in low chairs, which can make it hard to get up. · Put heat or cold on your sore joints as needed. Use whichever helps you most. You also can take turns with hot and cold packs. ? Apply heat 2 or 3 times a day for 20 to 30 minutes--using a heating pad, hot shower, or hot pack--to relieve pain and stiffness. ? Put ice or a cold pack on your sore joint for 10 to 20 minutes at a time. Put a thin cloth between the ice and your skin. When should you call for help? Call your doctor now or seek immediate medical care if:    · You have sudden swelling, warmth, or pain in any joint.     · You have joint pain and a fever or rash.     · You have such bad pain that you cannot use a joint.    Watch closely for changes in your health, and be sure to contact your doctor if:    · You have mild joint symptoms that continue even with more than 6 weeks of care at home.     · You have stomach pain or other problems with your medicine. Where can you learn more? Go to http://surinderHunt Country Hopsmarley.info/. Enter Q899 in the search box to learn more about \"Arthritis: Care Instructions. \"  Current as of: April 1, 2019  Content Version: 12.2  © 0819-9769 [employer]. Care instructions adapted under license by [employer] (which disclaims liability or warranty for this information). If you have questions about a medical condition or this instruction, always ask your healthcare professional. [place] [address] any warranty or liability for your use of this information. Atrial Fibrillation: Care Instructions  Your Care Instructions    Atrial fibrillation is an irregular and often fast heartbeat. Treating this condition is important for several reasons. It can cause blood clots, which can travel from your heart to your brain and cause a stroke.  If you have a fast heartbeat, you may feel lightheaded, dizzy, and weak. An irregular heartbeat can also increase your risk for heart failure. Atrial fibrillation is often the result of another heart condition, such as high blood pressure or coronary artery disease. Making changes to improve your heart condition will help you stay healthy and active. Follow-up care is a key part of your treatment and safety. Be sure to make and go to all appointments, and call your doctor if you are having problems. It's also a good idea to know your test results and keep a list of the medicines you take. How can you care for yourself at home? Medicines    · Take your medicines exactly as prescribed. Call your doctor if you think you are having a problem with your medicine. You will get more details on the specific medicines your doctor prescribes.     · If your doctor has given you a blood thinner to prevent a stroke, be sure you get instructions about how to take your medicine safely. Blood thinners can cause serious bleeding problems.     · Do not take any vitamins, over-the-counter drugs, or herbal products without talking to your doctor first.    Lifestyle changes    · Do not smoke. Smoking can increase your chance of a stroke and heart attack. If you need help quitting, talk to your doctor about stop-smoking programs and medicines. These can increase your chances of quitting for good.     · Eat a heart-healthy diet.     · Stay at a healthy weight. Lose weight if you need to.     · Limit alcohol to 2 drinks a day for men and 1 drink a day for women. Too much alcohol can cause health problems.     · Avoid colds and flu. Get a pneumococcal vaccine shot. If you have had one before, ask your doctor whether you need another dose. Get a flu shot every year. If you must be around people with colds or flu, wash your hands often. Activity    · If your doctor recommends it, get more exercise. Walking is a good choice. Bit by bit, increase the amount you walk every day.  Try for at least 30 minutes on most days of the week. You also may want to swim, bike, or do other activities. Your doctor may suggest that you join a cardiac rehabilitation program so that you can have help increasing your physical activity safely.     · Start light exercise if your doctor says it is okay. Even a small amount will help you get stronger, have more energy, and manage stress. Walking is an easy way to get exercise. Start out by walking a little more than you did in the hospital. Gradually increase the amount you walk.     · When you exercise, watch for signs that your heart is working too hard. You are pushing too hard if you cannot talk while you are exercising. If you become short of breath or dizzy or have chest pain, sit down and rest immediately.     · Check your pulse regularly. Place two fingers on the artery at the palm side of your wrist, in line with your thumb. If your heartbeat seems uneven or fast, talk to your doctor. When should you call for help? Call 911 anytime you think you may need emergency care. For example, call if:    · You have symptoms of a heart attack. These may include:  ? Chest pain or pressure, or a strange feeling in the chest.  ? Sweating. ? Shortness of breath. ? Nausea or vomiting. ? Pain, pressure, or a strange feeling in the back, neck, jaw, or upper belly or in one or both shoulders or arms. ? Lightheadedness or sudden weakness. ? A fast or irregular heartbeat. After you call 911, the  may tell you to chew 1 adult-strength or 2 to 4 low-dose aspirin. Wait for an ambulance. Do not try to drive yourself.     · You have symptoms of a stroke. These may include:  ? Sudden numbness, tingling, weakness, or loss of movement in your face, arm, or leg, especially on only one side of your body. ? Sudden vision changes. ? Sudden trouble speaking. ? Sudden confusion or trouble understanding simple statements. ? Sudden problems with walking or balance.   ? A sudden, severe headache that is different from past headaches.     · You passed out (lost consciousness).    Call your doctor now or seek immediate medical care if:    · You have new or increased shortness of breath.     · You feel dizzy or lightheaded, or you feel like you may faint.     · Your heart rate becomes irregular.     · You can feel your heart flutter in your chest or skip heartbeats. Tell your doctor if these symptoms are new or worse.    Watch closely for changes in your health, and be sure to contact your doctor if you have any problems. Where can you learn more? Go to http://surinderiMall.eumarley.info/. Enter U020 in the search box to learn more about \"Atrial Fibrillation: Care Instructions. \"  Current as of: April 9, 2019  Content Version: 12.2  © 9921-2844 [employer]. Care instructions adapted under license by [employer] (which disclaims liability or warranty for this information). If you have questions about a medical condition or this instruction, always ask your healthcare professional. [place] [address] any warranty or liability for your use of this information. Osteoarthritis: Care Instructions  Your Care Instructions    Arthritis is a common health problem in which the joints are inflamed. There are several kinds of arthritis. Osteoarthritis is caused by a breakdown of cartilage, the hard, thick tissue that cushions the joints. It causes pain, stiffness, and swelling, often in the spine, fingers, hips, and knees. Osteoarthritis can happen at any age, but it is most common in older people. Osteoarthritis never goes away completely, but it can be controlled. Medicine and home treatment can reduce the pain and prevent the arthritis from getting worse. Follow-up care is a key part of your treatment and safety. Be sure to make and go to all appointments, and call your doctor if you are having problems.  It's also a good idea to know your test results and keep a list of the medicines you take. How can you care for yourself at home? · Take a warm shower or bath in the morning to relieve stiffness. Avoid sitting still afterwards. · If the joint is not swollen, use moist heat, like a warm, damp towel, for 20 to 30 minutes, 2 or 3 times a day. Do not use heat on a swollen joint. · If the joint is swollen, use ice or cold packs for 10 to 20 minutes, once an hour. Cold will help relieve pain and reduce inflammation. Put a thin cloth between the ice and your skin. · To prevent stiffness, gently move the joint through its full range of motion several times a day. · If the joint hurts, avoid activities that put a strain on it for a few days. Take rest breaks throughout the day. · Get regular exercise. Walking, swimming, yoga, biking, marti chi, and water aerobics are good exercises that are gentle on the joints. · Reach and stay at a healthy weight. If you need to lose or maintain weight, regular exercise and a healthy diet will help. Extra weight can strain the joints, especially the knees and hips, and make the pain worse. Losing even a few pounds may help. · Take pain medicines exactly as directed. ? If the doctor gave you a prescription medicine for pain, take it as prescribed. ? If you are not taking a prescription pain medicine, ask your doctor if you can take an over-the-counter medicine. When should you call for help?   Call your doctor now or seek immediate medical care if:    · The pain is so bad that you cannot use the joint.     · You have sudden back pain with weakness in your legs or loss of bowel or bladder control.     · Your stools are black and tarlike or have streaks of blood.     · You have severe pain and swelling in more than one joint.    Watch closely for changes in your health, and be sure to contact your doctor if:    · You have side effects from the medicines, like belly pain, ongoing heartburn, or nausea.     · Joint pain continues for more than 6 weeks, and home treatment is not helping. Where can you learn more? Go to http://surinder-marley.info/. Enter U666 in the search box to learn more about \"Osteoarthritis: Care Instructions. \"  Current as of: April 1, 2019  Content Version: 12.2  © 6987-4896 [employer]. Care instructions adapted under license by [employer] (which disclaims liability or warranty for this information). If you have questions about a medical condition or this instruction, always ask your healthcare professional. [address] any warranty or liability for your use of this information. Polymyalgia Rheumatica: Care Instructions  Your Care Instructions  Polymyalgia rheumatica causes pain and swelling in joints and muscles, mainly in the hips, neck, and shoulders. Pain and swelling may be worse in the morning. This condition can occur quickly and often lasts for a year or two. Your doctor will treat you with medicine to reduce swelling. Your symptoms should get much better in 1 to 3 days and go away in 2 to 4 weeks. Still, you may need to take medicine to prevent it from coming back. You may be on the medicine for 1 to 2 years or longer. Some people who have this also get giant cell arteritis (temporal arteritis), which causes swelling of some blood vessels in the head. Tell your doctor if you have any headaches, jaw pain, or tightness or tenderness along the temple or scalp. This condition can cause blindness if it is not treated. Tell your doctor if you have problems with your vision, including blurring or seeing double. Follow-up care is a key part of your treatment and safety. Be sure to make and go to all appointments, and call your doctor if you are having problems. It's also a good idea to know your test results and keep a list of the medicines you take. How can you care for yourself at home? · Take your medicines exactly as prescribed.  Call your doctor if you think you are having a problem with your medicine. You may get medicines to reduce pain and to keep your bones from getting thin. · Take anti-inflammatory medicines to reduce pain, if your doctor recommends them. These include ibuprofen (Advil, Motrin) and naproxen (Aleve). Read and follow all instructions on the label. · Eat a healthy diet. Make sure to drink milk and eat dairy products, such as low-fat cheese and yogurt. Ask your doctor how much calcium you need. If you cannot eat dairy products or you do not get enough calcium from food, you may take pills. · Do gentle weight lifting to protect your bones. This is very important for women who have gone through menopause. · Do not smoke or allow others to smoke around you. If you need help quitting, talk to your doctor about stop-smoking programs and medicines. These can increase your chances of quitting for good. When should you call for help? Call your doctor now or seek immediate medical care if:    · You have a headache, jaw pain, or problems seeing.    Watch closely for changes in your health, and be sure to contact your doctor if:    · Your joint and muscle pain or stiffness gets worse.     · You have side effects from your corticosteroid medicine, such as:  ? Signs of diabetes (feeling thirsty all the time, needing to urinate often). ? Signs of infection (fever, chills, cough, burning during urination, severe sore throat, or skin infection). ? A large weight gain. ? Mood changes. ? Trouble sleeping. ? Bruising easily.     · You have any other problems with your medicine.     · You do not get better as expected. Where can you learn more? Go to http://surinder-marley.info/. Enter M396 in the search box to learn more about \"Polymyalgia Rheumatica: Care Instructions. \"  Current as of: April 1, 2019  Content Version: 12.2  © 9788-1517 [employer].  Care instructions adapted under license by [facility] (which disclaims liability or warranty for this information). If you have questions about a medical condition or this instruction, always ask your healthcare professional. [address] any warranty or liability for your use of this information. Pregabalin (By mouth)   Pregabalin (pre-GA-ba-keyanna)  Treats nerve and muscle pain, including fibromyalgia. Also treats seizures. Brand Name(s): Lyrica   There may be other brand names for this medicine. When This Medicine Should Not Be Used: This medicine is not right for everyone. Do not use it if you had an allergic reaction to pregabalin. How to Use This Medicine:   Capsule, Liquid  · Take your medicine as directed. Your dose may need to be changed several times to find what works best for you. · Measure the oral liquid medicine with a marked measuring spoon, oral syringe, or medicine cup. · This medicine should come with a Medication Guide. Ask your pharmacist for a copy if you do not have one. · Missed dose: Take a dose as soon as you remember. If it is almost time for your next dose, wait until then and take a regular dose. Do not take extra medicine to make up for a missed dose. · Store the medicine in a closed container at room temperature, away from heat, moisture, and direct light. Drugs and Foods to Avoid:   Ask your doctor or pharmacist before using any other medicine, including over-the-counter medicines, vitamins, and herbal products. · Some medicines can affect how pregabalin works. Tell your doctor if you are using any of the following:   ¨ Diabetes medicine that you take by mouth (pioglitazone, rosiglitazone)  ¨ An ACE inhibitor (benazepril, enalapril, lisinopril, quinapril, ramipril)  · Tell your doctor if you use anything else that makes you sleepy. Some examples are allergy medicine, narcotic pain medicine, and alcohol.   Warnings While Using This Medicine:   · Tell your doctor if you are pregnant or breastfeeding, or if you have kidney disease, heart failure, heart rhythm problems, angioedema, a bleeding disorder, or a low blood platelet count. Tell your doctor if you have a history of alcohol or drug abuse, depression, or other mood problems. · This medicine may cause the following problems:   ¨ Serious allergic reactions  ¨ Suicidal thoughts  · This medicine may make you dizzy or drowsy. It may also cause blurry or double vision. Do not drive or do anything that could be dangerous until you know how this medicine affects you. · Do not stop using this medicine suddenly. Your doctor will need to slowly decrease your dose before you stop it completely. · Your doctor will check your progress and the effects of this medicine at regular visits. Keep all appointments. · Keep all medicine out of the reach of children. Never share your medicine with anyone. Possible Side Effects While Using This Medicine:   Call your doctor right away if you notice any of these side effects:  · Allergic reaction: Itching or hives, swelling in your face or hands, swelling or tingling in your mouth or throat, chest tightness, trouble breathing  · Blistering, peeling, red skin rash  · Blurry or double vision  · Fever, chills, cough, sore throat, and body aches  · Muscle pain, tenderness, or weakness, fever, or general ill feeling  · Rapid weight gain, swelling in your hands, ankles, or feet  · Severe dizziness or drowsiness  · Sudden mood changes, unusual thoughts or behavior such as extreme happiness or depression  · Suicidal thoughts  · Swelling in your throat, head, or neck  · Uneven heartbeat  · Unusual bleeding, bruising, or weakness  If you notice these less serious side effects, talk with your doctor:   · Confusion, trouble concentrating  · Constipation  · Dry mouth  If you notice other side effects that you think are caused by this medicine, tell your doctor. Call your doctor for medical advice about side effects.  You may report side effects to FDA at 7-209-FDA-7346  © 2017 [facility] Information is for End User's use only and may not be sold, redistributed or otherwise used for commercial purposes. The above information is an  only. It is not intended as medical advice for individual conditions or treatments. Talk to your doctor, nurse or pharmacist before following any medical regimen to see if it is safe and effective for you. Oral Corticosteroids: Care Instructions  Your Care Instructions    Oral corticosteroids are commonly used medicines. They help calm down the body's response to inflammation. Oral means that they are taken by mouth. This is most often in the form of a pill. They are used for treating many conditions. You may take them for asthma, COPD, back pain, or allergic reactions. They are also used for other conditions such as autoimmune diseases and certain types of cancer. You may have side effects from taking this medicine. These include nausea, headache, dizziness, and anxiety. Pregnant women should not take this medicine unless their doctor tells them to. Follow your doctor's instructions on how to take this medicine. If you are taking it for 2 weeks or more, your doctor may give you special instructions to slowly reduce (taper) the amount you take. Slowly cutting down on the medicine over time helps your body adjust to the change. Follow-up care is a key part of your treatment and safety. Be sure to make and go to all appointments, and call your doctor if you are having problems. It's also a good idea to know your test results and keep a list of the medicines you take. How can you care for yourself at home? · Be safe with medicines. Take your medicines exactly as prescribed. Call your doctor if you think you are having a problem with your medicine. You will get more details on the specific medicines your doctor prescribes.   · Take your medicine after a meal. It may cause nausea if you take it on an empty stomach. · Avoid taking nonsteroidal anti-inflammatory drugs (NSAIDs) while you are taking oral corticosteroids. Taking both of these medicines might cause an upset stomach. NSAIDs include ibuprofen (Advil, Motrin) and naproxen (Aleve). · If you have a history of stomach ulcers, you may want to avoid taking this medicine and an NSAID at the same time. This can cause stomach upset or bleeding. · Follow your doctor's instructions for how to stop taking this medicine. You may need to taper it. This means the medicine should be slowly reduced. Do not stop taking the medicine all at once. When should you call for help? Call 911 if:    · You vomit blood or what looks like coffee grounds.    Call your doctor now or seek immediate medical care if:    · Your symptoms are getting worse.     · You are dizzy or lightheaded, or you feel like you may faint.     · You have new or worse nausea or vomiting.     · You have stomach pain that is getting worse.     · Your stools are black.    Watch closely for changes in your health, and be sure to contact your doctor if:    · You do not get better as expected. Where can you learn more? Go to http://surinder-marley.info/. Enter X490 in the search box to learn more about \"Oral Corticosteroids: Care Instructions. \"  Current as of: June 9, 2019  Content Version: 12.2  © 9934-6638 [employer]. Care instructions adapted under license by [employer] (which disclaims liability or warranty for this information). If you have questions about a medical condition or this instruction, always ask your healthcare professional. [place] [address] any warranty or liability for your use of this information. Eating Healthy Foods: Care Instructions  Your Care Instructions    Eating healthy foods can help lower your risk for disease.  Healthy food gives you energy and keeps your heart strong, your brain active, your muscles working, and your bones strong. A healthy diet includes a variety of foods from the basic food groups: grains, vegetables, fruits, milk and milk products, and meat and beans. Some people may eat more of their favorite foods from only one food group and, as a result, miss getting the nutrients they need. So, it is important to pay attention not only to what you eat but also to what you are missing from your diet. You can eat a healthy, balanced diet by making a few small changes. Follow-up care is a key part of your treatment and safety. Be sure to make and go to all appointments, and call your doctor if you are having problems. It's also a good idea to know your test results and keep a list of the medicines you take. How can you care for yourself at home? Look at what you eat  · Keep a food diary for a week or two and record everything you eat or drink. Track the number of servings you eat from each food group. · For a balanced diet every day, eat a variety of:  ? 6 or more ounce-equivalents of grains, such as cereals, breads, crackers, rice, or pasta, every day. An ounce-equivalent is 1 slice of bread, 1 cup of ready-to-eat cereal, or ½ cup of cooked rice, cooked pasta, or cooked cereal.  ? 2½ cups of vegetables, especially:  § Dark-green vegetables such as broccoli and spinach. § Orange vegetables such as carrots and sweet potatoes. § Dry beans (such as avila and kidney beans) and peas (such as lentils). ? 2 cups of fresh, frozen, or canned fruit. A small apple or 1 banana or orange equals 1 cup. ? 3 cups of nonfat or low-fat milk, yogurt, or other milk products. ? 5½ ounces of meat and beans, such as chicken, fish, lean meat, beans, nuts, and seeds. One egg, 1 tablespoon of peanut butter, ½ ounce nuts or seeds, or ¼ cup of cooked beans equals 1 ounce of meat. · Learn how to read food labels for serving sizes and ingredients.  Fast-food and convenience-food meals often contain few or no fruits or vegetables. Make sure you eat some fruits and vegetables to make the meal more nutritious. · Look at your food diary. For each food group, add up what you have eaten and then divide the total by the number of days. This will give you an idea of how much you are eating from each food group. See if you can find some ways to change your diet to make it more healthy. Start small  · Do not try to make dramatic changes to your diet all at once. You might feel that you are missing out on your favorite foods and then be more likely to fail. · Start slowly, and gradually change your habits. Try some of the following:  ? Use whole wheat bread instead of white bread. ? Use nonfat or low-fat milk instead of whole milk. ? Eat brown rice instead of white rice, and eat whole wheat pasta instead of white-flour pasta. ? Try low-fat cheeses and low-fat yogurt. ? Add more fruits and vegetables to meals and have them for snacks. ? Add lettuce, tomato, cucumber, and onion to sandwiches. ? Add fruit to yogurt and cereal.  Enjoy food  · You can still eat your favorite foods. You just may need to eat less of them. If your favorite foods are high in fat, salt, and sugar, limit how often you eat them, but do not cut them out entirely. · Eat a wide variety of foods. Make healthy choices when eating out  · The type of restaurant you choose can help you make healthy choices. Even fast-food chains are now offering more low-fat or healthier choices on the menu. · Choose smaller portions, or take half of your meal home. · When eating out, try:  ? A veggie pizza with a whole wheat crust or grilled chicken (instead of sausage or pepperoni). ? Pasta with roasted vegetables, grilled chicken, or marinara sauce instead of cream sauce. ? A vegetable wrap or grilled chicken wrap. ? Broiled or poached food instead of fried or breaded items.   Make healthy choices easy  · Buy packaged, prewashed, ready-to-eat fresh vegetables and fruits, such as baby carrots, salad mixes, and chopped or shredded broccoli and cauliflower. · Buy packaged, presliced fruits, such as melon or pineapple. · Choose 100% fruit or vegetable juice instead of soda. Limit juice intake to 4 to 6 oz (½ to ¾ cup) a day. · Blend low-fat yogurt, fruit juice, and canned or frozen fruit to make a smoothie for breakfast or a snack. Where can you learn more? Go to http://surinder-marley.info/. Enter T756 in the search box to learn more about \"Eating Healthy Foods: Care Instructions. \"  Current as of: November 7, 2018  Content Version: 12.2  © 3120-1134 [employer]. Care instructions adapted under license by [employer] (which disclaims liability or warranty for this information). If you have questions about a medical condition or this instruction, always ask your healthcare professional. [place] [address] any warranty or liability for your use of this information. Learning About Physical Activity  What is physical activity? Physical activity is any kind of activity that gets your body moving. The types of physical activity that can help you get fit and stay healthy include:  · Aerobic or \"cardio\" activities that make your heart beat faster and make you breathe harder, such as brisk walking, riding a bike, or running. Aerobic activities strengthen your heart and lungs and build up your endurance. · Strength training activities that make your muscles work against, or \"resist,\" something, such as lifting weights or doing push-ups. These activities help tone and strengthen your muscles. · Stretches that allow you to move your joints and muscles through their full range of motion. Stretching helps you be more flexible and avoid injury. What are the benefits of physical activity? Being active is one of the best things you can do to get fit and stay healthy.  It helps you to:  · Feel stronger and have more energy to do all the things you like to do. · Focus better at school or work and perform better in sports. · Feel, think, and sleep better. · Reach and stay at a healthy weight. · Lose fat and build lean muscle. · Lower your risk for serious health problems. · Keep your bones, muscles, and joints strong. Being fit lets you do more physical activity. And it lets you work out harder without as much effort. How can you make physical activity part of your life? Get at least 30 minutes of exercise on most days of the week. Walking is a good choice. You also may want to do other activities, such as running, swimming, cycling, or playing tennis or team sports. Pick activities that you like--ones that make your heart beat faster, your muscles stronger, and your muscles and joints more flexible. If you find more than one thing you like doing, do them all. You don't have to do the same thing every day. Get your heart pumping every day. Any activity that makes your heart beat faster and keeps it at that rate for a while counts. Here are some great ways to get your heart beating faster:  · Go for a brisk walk, run, or bike ride. · Go for a hike or swim. · Go in-line skating. · Play a game of touch football, basketball, or soccer. · Ride a bike. · Play tennis or racquetball. · Climb stairs. Even some household chores can be aerobic--just do them at a faster pace. Vacuuming, raking or mowing the lawn, sweeping the garage, and washing and waxing the car all can help get your heart rate up. Strengthen your muscles during the week. You don't have to lift heavy weights or grow big, bulky muscles to get stronger. Doing a few simple activities that make your muscles work against, or \"resist,\" something can help you get stronger. For example, you can:  · Do push-ups or sit-ups, which use your own body weight as resistance.   · Lift weights or dumbbells or use stretch bands at home or in a gym or community center. Stretch your muscles often. Stretching will help you as you become more active. It can help you stay flexible, loosen tight muscles, and avoid injury. It can also help improve your balance and posture and can be a great way to relax. Be sure to stretch the muscles you'll be using when you work out. It's best to warm your muscles slightly before you stretch them. Walk or do some other light aerobic activity for a few minutes, and then start stretching. When you stretch your muscles:  · Do it slowly. Stretching is not about going fast or making sudden movements. · Don't push or bounce during a stretch. · Hold each stretch for at least 15 to 30 seconds, if you can. You should feel a stretch in the muscle, but not pain. · Breathe out as you do the stretch. Then breathe in as you hold the stretch. Don't hold your breath. If you're worried about how more activity might affect your health, have a checkup before you start. Follow any special advice your doctor gives you for getting a smart start. Where can you learn more? Go to http://surinder-marley.info/. Enter J837 in the search box to learn more about \"Learning About Physical Activity. \"  Current as of: May 5, 2019  Content Version: 12.2  © 1263-2125 [employer]. Care instructions adapted under license by [employer] (which disclaims liability or warranty for this information). If you have questions about a medical condition or this instruction, always ask your healthcare professional. [place] [address] any warranty or liability for your use of this information.

## 2020-03-03 LAB
CRP SERPL HS-MCNC: 16.13 MG/L (ref 0–3)
ERYTHROCYTE [SEDIMENTATION RATE] IN BLOOD BY WESTERGREN METHOD: 20 MM/HR (ref 0–40)

## 2020-03-09 ENCOUNTER — OFFICE VISIT (OUTPATIENT)
Dept: INTERNAL MEDICINE CLINIC | Age: 80
End: 2020-03-09

## 2020-03-09 VITALS
OXYGEN SATURATION: 95 % | DIASTOLIC BLOOD PRESSURE: 50 MMHG | TEMPERATURE: 97.7 F | SYSTOLIC BLOOD PRESSURE: 134 MMHG | BODY MASS INDEX: 38.45 KG/M2 | HEART RATE: 63 BPM | RESPIRATION RATE: 16 BRPM | WEIGHT: 217 LBS | HEIGHT: 63 IN

## 2020-03-09 DIAGNOSIS — I10 ESSENTIAL HYPERTENSION: ICD-10-CM

## 2020-03-09 DIAGNOSIS — M17.0 PRIMARY OSTEOARTHRITIS OF BOTH KNEES: ICD-10-CM

## 2020-03-09 DIAGNOSIS — E66.01 SEVERE OBESITY WITH BODY MASS INDEX (BMI) OF 35.0 TO 39.9 WITH SERIOUS COMORBIDITY (HCC): ICD-10-CM

## 2020-03-09 DIAGNOSIS — I48.0 PAROXYSMAL ATRIAL FIBRILLATION (HCC): ICD-10-CM

## 2020-03-09 DIAGNOSIS — Z79.01 ANTICOAGULATED: ICD-10-CM

## 2020-03-09 DIAGNOSIS — M35.3 POLYMYALGIA RHEUMATICA (HCC): Primary | ICD-10-CM

## 2020-03-09 RX ORDER — PREDNISOLONE 15 MG/5ML
15 SOLUTION ORAL 2 TIMES DAILY
Qty: 300 ML | Refills: 1 | Status: SHIPPED | OUTPATIENT
Start: 2020-03-09 | End: 2020-03-09 | Stop reason: DRUGHIGH

## 2020-03-09 RX ORDER — PREGABALIN 50 MG/1
50 CAPSULE ORAL 3 TIMES DAILY
Qty: 90 CAP | Refills: 1 | Status: SHIPPED | OUTPATIENT
Start: 2020-03-09 | End: 2020-04-08

## 2020-03-09 NOTE — PROGRESS NOTES
HISTORY OF PRESENT ILLNESS  Elinor Oden is a 70 y.o. female. Shoulder Pain    The history is provided by the patient. The incident occurred more than 1 week ago (x years but worse x few weeks). There was no injury mechanism. Both shoulders are affected. The pain is severe. Pain course: resolved. There is no history of shoulder injury. There is no history of shoulder surgery. Associated symptoms include numbness, muscle weakness and tingling. Saw my partner, did labs on her. Sed rate was normal but C-reactive protein significantly elevated at 16. Thought she had polymyalgia rheumatica. Placed on steroids, her pain is much improved. Dosage quite elevated, liquid prednisolone. Also started on Lyrica. Past Medical History:   Diagnosis Date    Arthritis     left knee    GERD (gastroesophageal reflux disease)     Hypercholesterolemia     Hypertension     Hypothyroid 2004       Review of Systems   Respiratory: Negative for shortness of breath. Cardiovascular: Negative for chest pain. Gastrointestinal: Negative for blood in stool. Musculoskeletal: Positive for joint pain. Neurological: Positive for tingling and numbness.      Patient Active Problem List   Diagnosis Code    Generalized OA M15.9    Hypothyroidism E03.9    HTN (hypertension) I10    Paroxysmal atrial fibrillation (HCC) I48.0    Severe obesity with body mass index (BMI) of 35.0 to 39.9 with serious comorbidity (HCC) E66.01    Hypercholesterolemia E78.00    Polymyalgia rheumatica (HCC) M35.3     Social History     Socioeconomic History    Marital status:      Spouse name: Not on file    Number of children: Not on file    Years of education: Not on file    Highest education level: Not on file   Occupational History    Not on file   Social Needs    Financial resource strain: Not on file    Food insecurity     Worry: Not on file     Inability: Not on file    Transportation needs     Medical: Not on file Non-medical: Not on file   Tobacco Use    Smoking status: Never Smoker    Smokeless tobacco: Never Used   Substance and Sexual Activity    Alcohol use: Yes     Alcohol/week: 14.0 standard drinks     Types: 14 Glasses of wine per week     Comment: 4 maybe more    Drug use: No    Sexual activity: Yes     Partners: Male     Birth control/protection: None   Lifestyle    Physical activity     Days per week: Not on file     Minutes per session: Not on file    Stress: Not on file   Relationships    Social connections     Talks on phone: Not on file     Gets together: Not on file     Attends Yazdanism service: Not on file     Active member of club or organization: Not on file     Attends meetings of clubs or organizations: Not on file     Relationship status: Not on file    Intimate partner violence     Fear of current or ex partner: Not on file     Emotionally abused: Not on file     Physically abused: Not on file     Forced sexual activity: Not on file   Other Topics Concern    Not on file   Social History Narrative    sailing         Physical Exam  Visit Vitals  /50   Pulse 63   Temp 97.7 °F (36.5 °C)   Resp 16   Ht 5' 3\" (1.6 m)   Wt 217 lb (98.4 kg)   SpO2 95%   BMI 38.44 kg/m²     WD WN female NAD  Heart RRR without murmers clicks or rubs  Lungs CTA  Abdo soft nontender  Ext no edema no obvious deformities or redness  ASSESSMENT and PLAN  Encounter Diagnoses   Name Primary?  Polymyalgia rheumatica (HCC) Yes    Essential hypertension     Primary osteoarthritis of both knees     Severe obesity with body mass index (BMI) of 35.0 to 39.9 with serious comorbidity (HCC)     Paroxysmal atrial fibrillation (HCC)     Anticoagulated      PMR generally responds to lower dosages of steroids. We will gradually get her off the evening steroids. Discussed possible side affects, precautions, and drug interactions and possible benefits of the medication(s).   Now that she is feeling better can get some exercise to help her with weight loss although diet is more important. Orders Placed This Encounter    AMB SUPPLY ORDER    DISCONTD: prednisoLONE (PRELONE) 15 mg/5 mL syrup    pregabalin (LYRICA) 50 mg capsule    prednisoLONE 5 mg tab     Discussed the patient's BMI with her. The BMI follow up plan is as follows:     dietary management education, guidance, and counseling  encourage exercise  monitor weight  prescribed dietary intake    An After Visit Summary was printed and given to the patient. Follow-up and Dispositions    · Return in about 4 weeks (around 4/6/2020) for routine follow up.

## 2020-03-09 NOTE — LETTER
3/9/2020 [id number] Dear Bisi Albarran: 
 
Please find your most recent results below. Resulted Orders CRP, HIGH SENSITIVITY (Collected: 3/2/2020  1:18 PM) Result Value Ref Range C-Reactive Protein, Cardiac 16.13 (H) 0.00 - 3.00 mg/L Comment:  
            Relative Risk for Future Cardiovascular Event Low                 <1.00 Average       1.00 - 3.00 High                >3.00 Narrative Performed at:  [address]  [id number] : Hilton Rocha MD, Phone:  [phone] SED RATE (ESR) (Collected: 3/2/2020  1:18 PM) Result Value Ref Range Sed rate (ESR) 20 0 - 40 mm/hr Narrative Performed at:  [address]  [id number] : Hilton Rocha MD, Phone:  [phone] RECOMMENDATIONS: 
None. Keep up the good work! Continue prednisolone Please call me if you have any questions: [phone] Sincerely, Lorraine Martins MD

## 2020-03-09 NOTE — PROGRESS NOTES
Chief Complaint   Patient presents with    Shoulder Pain     follow up with pain spin, shoulder, back and hips and knees, hands     I have reviewed the patient's medical history in detail and updated the computerized patient record. Health Maintenance reviewed. 1. Have you been to the ER, urgent care clinic since your last visit? Hospitalized since your last visit?no    2. Have you seen or consulted any other health care providers outside of the [address] [place] [address] [place] since your last visit? Include any pap smears or colon screening. No    Pt stated he/she does have an Advance Directive    Fall Risk Assessment, last 12 mths 3/9/2020   Able to walk? Yes   Fall in past 12 months? No   Fall with injury? -   Number of falls in past 12 months -   Fall Risk Score -       3 most recent PHQ Screens 3/9/2020   Little interest or pleasure in doing things Several days   Feeling down, depressed, irritable, or hopeless Several days   Total Score PHQ 2 2       Abuse Screening Questionnaire 2/24/2020   Do you ever feel afraid of your partner? N   Are you in a relationship with someone who physically or mentally threatens you? N   Is it safe for you to go home?  Y       ADL Assessment 2/24/2020   Feeding yourself No Help Needed   Getting from bed to chair No Help Needed   Getting dressed No Help Needed   Bathing or showering No Help Needed   Walk across the room (includes cane/walker) No Help Needed   Using the telphone No Help Needed   Taking your medications No Help Needed   Preparing meals No Help Needed   Managing money (expenses/bills) No Help Needed   Moderately strenuous housework (laundry) No Help Needed   Shopping for personal items (toiletries/medicines) No Help Needed   Shopping for groceries No Help Needed   Driving No Help Needed   Climbing a flight of stairs No Help Needed   Getting to places beyond walking distances No Help Needed

## 2020-03-09 NOTE — PATIENT INSTRUCTIONS
We discussed stratagies to reduce weight. Specifically discussed the Keto and Intemmitant Fasting diet, the [address]. Discussed weight loss programs as well as exercise, although emphasized caloric restriction. Consider keeping a food diary and seeing what you eat in a day and looking for where you can cut calories. Try taking a picture of everything you eat for a day. Phone apps can help witrh weight loss. Consider 'Lose It' You must reduce liquid calories like soda and juices. Weight Watchers or other weight loss programs can be very helpful. There is no \"magic pill', but there some newly FDA approved medications for obesity. Learning About Low-Carbohydrate Diets for Weight Loss What is a low-carbohydrate diet? Low-carb diets avoid foods that are high in carbohydrate. These high-carb foods include pasta, bread, rice, cereal, fruits, and starchy vegetables. Instead, these diets usually have you eat foods that are high in fat and protein. Many people lose weight quickly on a low-carb diet. But the early weight loss is water. People on this diet often gain the weight back after they start eating carbs again. Not all diet plans are safe or work well. A lot of the evidence shows that low-carb diets aren't healthy. That's because these diets often don't include healthy foods like fruits and vegetables. Losing weight safely means balancing protein, fat, and carbs with every meal and snack. And low-carb diets don't always provide the vitamins, minerals, and fiber you need. If you have a serious medical condition, talk to your doctor before you try any diet. These conditions include kidney disease, heart disease, type 2 diabetes, high cholesterol, and high blood pressure. If you are pregnant, it may not be safe for your baby if you are on a low-carb diet. How can you lose weight safely? You might have heard that a diet plan helped another person lose weight. But that doesn't mean that it will work for you. It is very hard to stay on a diet that includes lots of big changes in your eating habits. If you want to get to a healthy weight and stay there, making healthy lifestyle changes will often work better than dieting. These steps can help. · Make a plan for change. Work with your doctor to create a plan that is right for you. · See a dietitian. He or she can show you how to make healthy changes in your eating habits. · Manage stress. If you have a lot of stress in your life, it can be hard to focus on making healthy changes to your daily habits. · Track your food and activity. You are likely to do better at losing weight if you keep track of what you eat and what you do. Follow-up care is a key part of your treatment and safety. Be sure to make and go to all appointments, and call your doctor if you are having problems. It's also a good idea to know your test results and keep a list of the medicines you take. Where can you learn more? Go to http://surinder-marley.info/. Enter A121 in the search box to learn more about \"Learning About Low-Carbohydrate Diets for Weight Loss. \" Current as of: November 7, 2018 Content Version: 12.2 © 7759-4485 [employer]. Care instructions adapted under license by [employer] (which disclaims liability or warranty for this information). If you have questions about a medical condition or this instruction, always ask your healthcare professional. [place] [address] any warranty or liability for your use of this information. Body Mass Index: Care Instructions Your Care Instructions Body mass index (BMI) can help you see if your weight is raising your risk for health problems. It uses a formula to compare how much you weigh with how tall you are. · A BMI lower than 18.5 is considered underweight. · A BMI between 18.5 and 24.9 is considered healthy. · A BMI between 25 and 29.9 is considered overweight. A BMI of 30 or higher is considered obese. If your BMI is in the normal range, it means that you have a lower risk for weight-related health problems. If your BMI is in the overweight or obese range, you may be at increased risk for weight-related health problems, such as high blood pressure, heart disease, stroke, arthritis or joint pain, and diabetes. If your BMI is in the underweight range, you may be at increased risk for health problems such as fatigue, lower protection (immunity) against illness, muscle loss, bone loss, hair loss, and hormone problems. BMI is just one measure of your risk for weight-related health problems. You may be at higher risk for health problems if you are not active, you eat an unhealthy diet, or you drink too much alcohol or use tobacco products. Follow-up care is a key part of your treatment and safety. Be sure to make and go to all appointments, and call your doctor if you are having problems. It's also a good idea to know your test results and keep a list of the medicines you take. How can you care for yourself at home? · Practice healthy eating habits. This includes eating plenty of fruits, vegetables, whole grains, lean protein, and low-fat dairy. · If your doctor recommends it, get more exercise. Walking is a good choice. Bit by bit, increase the amount you walk every day. Try for at least 30 minutes on most days of the week. · Do not smoke. Smoking can increase your risk for health problems. If you need help quitting, talk to your doctor about stop-smoking programs and medicines. These can increase your chances of quitting for good. · Limit alcohol to 2 drinks a day for men and 1 drink a day for women. Too much alcohol can cause health problems. If you have a BMI higher than 25 · Your doctor may do other tests to check your risk for weight-related health problems.  This may include measuring the distance around your waist. A waist measurement of more than 40 inches in men or 35 inches in women can increase the risk of weight-related health problems. · Talk with your doctor about steps you can take to stay healthy or improve your health. You may need to make lifestyle changes to lose weight and stay healthy, such as changing your diet and getting regular exercise. If you have a BMI lower than 18.5 · Your doctor may do other tests to check your risk for health problems. · Talk with your doctor about steps you can take to stay healthy or improve your health. You may need to make lifestyle changes to gain or maintain weight and stay healthy, such as getting more healthy foods in your diet and doing exercises to build muscle. Where can you learn more? Go to http://surinder-marley.info/. Enter S176 in the search box to learn more about \"Body Mass Index: Care Instructions. \" Current as of: October 13, 2016 Content Version: 11.4 © 5252-2763 [employer]. Care instructions adapted under license by [employer] (which disclaims liability or warranty for this information). If you have questions about a medical condition or this instruction, always ask your healthcare professional. [place] [address] any warranty or liability for your use of this information.

## 2020-03-13 ENCOUNTER — TELEPHONE (OUTPATIENT)
Dept: INTERNAL MEDICINE CLINIC | Age: 80
End: 2020-03-13

## 2020-03-13 NOTE — TELEPHONE ENCOUNTER
Returned pt's call, she is have a red itching rash to inner wrist.  VORB stop taking the Lyrica and keep taking the prednisone and call us back Monday.

## 2020-03-13 NOTE — TELEPHONE ENCOUNTER
Pt would like to speak with the nurse about some reactions she is having with her meds. Please call her at [phone].

## 2020-03-24 ENCOUNTER — TELEPHONE (OUTPATIENT)
Dept: INTERNAL MEDICINE CLINIC | Age: 80
End: 2020-03-24

## 2020-03-24 RX ORDER — PREDNISONE 5 MG/1
5 TABLET ORAL DAILY
Qty: 30 TAB | Refills: 2 | Status: SHIPPED | OUTPATIENT
Start: 2020-03-24 | End: 2020-04-30

## 2020-03-24 NOTE — TELEPHONE ENCOUNTER
Pt called in reference to needing a refill on her prednisone. She would like tabs not liquid. Pt would like it to be 5 mg. Currently she is taking 15 mg per day. Pt is taking 7 1/2 mg at night and 7 1/2 mg in the day. She would like to take no more than 15 mg tabs a day. Please call her at [phone].

## 2020-03-24 NOTE — TELEPHONE ENCOUNTER
Patient states that she has been taking Prednisone for about 2 weeks for Dx of PMR - it has been helping, but she sometimes feels a little jittery - she would like a refill of the prednisone but instead of the liquid she wants to try tablets, maybe 5mg and no more than 15 mg per day - please advise how she should take this dosage if okay by sending in a new prescription to [facility] mail order [id number] [facility], [facility]  7/10/1734  9:22 PM

## 2020-03-25 NOTE — TELEPHONE ENCOUNTER
Called and LM that medication was called into [address] [place] [address], LPN  1/25/7039  03:37 AM

## 2020-05-11 ENCOUNTER — VIRTUAL VISIT (OUTPATIENT)
Dept: INTERNAL MEDICINE CLINIC | Age: 80
End: 2020-05-11

## 2020-05-11 DIAGNOSIS — M35.3 POLYMYALGIA RHEUMATICA (HCC): Primary | ICD-10-CM

## 2020-05-11 DIAGNOSIS — I10 ESSENTIAL HYPERTENSION: ICD-10-CM

## 2020-05-11 DIAGNOSIS — I48.0 PAROXYSMAL ATRIAL FIBRILLATION (HCC): ICD-10-CM

## 2020-05-11 DIAGNOSIS — Z79.01 ANTICOAGULATED: ICD-10-CM

## 2020-05-11 DIAGNOSIS — H10.10 ALLERGIC CONJUNCTIVITIS, UNSPECIFIED LATERALITY: ICD-10-CM

## 2020-05-11 RX ORDER — CROMOLYN SODIUM 40 MG/ML
1 SOLUTION/ DROPS OPHTHALMIC 4 TIMES DAILY
Qty: 10 ML | Refills: 1 | Status: SHIPPED | OUTPATIENT
Start: 2020-05-11 | End: 2020-06-25

## 2020-05-11 RX ORDER — PREDNISONE 5 MG/1
TABLET ORAL
Qty: 90 TAB | Refills: 2 | Status: SHIPPED | OUTPATIENT
Start: 2020-05-11 | End: 2020-07-09 | Stop reason: SDUPTHER

## 2020-05-11 NOTE — PROGRESS NOTES
Chief Complaint   Patient presents with    Hypertension     follow up     Patient has not been out of the country in 45-90 days, NO diarrhea, NO cough, NO chest conjestion, NO temp. Pt has not been around anyone with these symptoms. Health Maintenance reviewed. I have reviewed the patient's medical history in detail and updated the computerized patient record. Encouraged pt to discuss pt's wishes with spouse/partner/family and bring them in the next appt to follow thru with the Advanced Directive      Encouraged pt to discuss pt's wishes with spouse/partner/family and bring them in the next appt to follow thru with the Advanced Directive    Fall Risk Assessment, last 12 mths 3/9/2020   Able to walk? Yes   Fall in past 12 months? No   Fall with injury? -   Number of falls in past 12 months -   Fall Risk Score -       3 most recent PHQ Screens 3/9/2020   Little interest or pleasure in doing things Several days   Feeling down, depressed, irritable, or hopeless Several days   Total Score PHQ 2 2       Abuse Screening Questionnaire 2/24/2020   Do you ever feel afraid of your partner? N   Are you in a relationship with someone who physically or mentally threatens you? N   Is it safe for you to go home?  Y       ADL Assessment 2/24/2020   Feeding yourself No Help Needed   Getting from bed to chair No Help Needed   Getting dressed No Help Needed   Bathing or showering No Help Needed   Walk across the room (includes cane/walker) No Help Needed   Using the telphone No Help Needed   Taking your medications No Help Needed   Preparing meals No Help Needed   Managing money (expenses/bills) No Help Needed   Moderately strenuous housework (laundry) No Help Needed   Shopping for personal items (toiletries/medicines) No Help Needed   Shopping for groceries No Help Needed   Driving No Help Needed   Climbing a flight of stairs No Help Needed   Getting to places beyond walking distances No Help Needed

## 2020-05-11 NOTE — PROGRESS NOTES
Consent: Nas Smiley, who was seen by synchronous (real-time) audio-video technology, and/or her healthcare decision maker, is aware that this patient-initiated, Telehealth encounter on 5/11/2020 is a billable service, with coverage as determined by her insurance carrier. She is aware that she may receive a bill and has provided verbal consent to proceed: Yes. Subjective:     Nas Smiley is a 70 y.o. female who presents for follow up of hyperlipidemia, Atrial Fibrillation, obesity and PMR. New concerns: lost some weight but re-gained, taking prednisone BID. She is doing her physical therapy, between that and the steroids is having minimal pain complaints. Interested in reducing steroid dosage. Complains of eye irritation times few weeks, some sneezing redness comes and goes. Minimal complaints of eye discharge. No changes in vision. Notes inc arrhytmia      Diet and Lifestyle: follows a diabetic diet regularly, exercises regularly, nonsmoker    Cardiovascular ROS: taking medications as instructed, side effects noted by patient include noted and inc hunger, no TIA's, no chest pain on exertion, no dyspnea on exertion, no swelling of ankles. Review of Systems, additional:  Pertinent items are noted in HPI. Patient Active Problem List    Diagnosis Date Noted    Polymyalgia rheumatica ([facility] 03/09/2020    Hypercholesterolemia 12/23/2019    Severe obesity with body mass index (BMI) of 35.0 to 39.9 with serious comorbidity ([address] 05/31/2018    Paroxysmal atrial fibrillation ([facility] 03/20/2017    Hypothyroidism 09/10/2014    HTN (hypertension) 09/10/2014    Generalized OA 09/05/2014     Allergies   Allergen Reactions    Lisinopril Cough     Social History     Tobacco Use    Smoking status: Never Smoker    Smokeless tobacco: Never Used   Substance Use Topics    Alcohol use:  Yes     Alcohol/week: 14.0 standard drinks     Types: 14 Glasses of wine per week     Comment: 4 maybe more Lab Results   Component Value Date/Time    WBC 6.5 02/24/2020 12:17 PM    HGB 15.3 02/24/2020 12:17 PM    HCT 45.6 02/24/2020 12:17 PM    PLATELET 048 98/98/8721 12:17 PM    MCV 99 (H) 02/24/2020 12:17 PM     Lab Results   Component Value Date/Time    Cholesterol, total 205 (H) 12/31/2019 01:53 PM    HDL Cholesterol 52 12/31/2019 01:53 PM    LDL, calculated 112 (H) 12/31/2019 01:53 PM    Triglyceride 207 (H) 12/31/2019 01:53 PM     Lab Results   Component Value Date/Time    ALT (SGPT) 43 (H) 02/24/2020 12:17 PM    AST (SGOT) 42 (H) 02/24/2020 12:17 PM    Alk. phosphatase 84 02/24/2020 12:17 PM    Bilirubin, direct 0.20 03/22/2018 10:04 AM    Bilirubin, total 0.5 02/24/2020 12:17 PM    Albumin 4.1 02/24/2020 12:17 PM    Protein, total 6.8 02/24/2020 12:17 PM    PLATELET 261 70/33/7557 12:17 PM     Lab Results   Component Value Date/Time    GFR est non-AA 75 02/24/2020 12:17 PM    GFR est AA 86 02/24/2020 12:17 PM    Creatinine 0.77 02/24/2020 12:17 PM    BUN 14 02/24/2020 12:17 PM    Sodium 139 02/24/2020 12:17 PM    Potassium 4.6 02/24/2020 12:17 PM    Chloride 100 02/24/2020 12:17 PM    CO2 27 02/24/2020 12:17 PM            Objective:     Physical exam significant for the following:     WNL    There were no vitals taken for this visit. Appearance: alert, well appearing, and in no distress and overweight. General exam: WNL. FLX Micro Assessment/Plan:     hyperlipidemia stable. ICD-10-CM ICD-9-CM    1. Polymyalgia rheumatica (HCC) M35.3 725    2. Essential hypertension I10 401.9    3. Anticoagulated Z79.01 V58.61    4. Paroxysmal atrial fibrillation (HCC) I48.0 427.31    5.  Allergic conjunctivitis, unspecified laterality H10.10 372.14      Orders Placed This Encounter    predniSONE (DELTASONE) 5 mg tablet     Sig: TAKE 1 TABLET BY MOUTH DAILY  Indications: muscle pain and stiffness in the shoulder, neck and pelvis     Dispense:  90 Tab     Refill:  2    cromolyn (OPTICROM) 4 % ophthalmic solution     Sig: Administer 1 Drop to both eyes four (4) times daily.  Use in affected eye(s)     Dispense:  10 mL     Refill:  1     Cont dec prednisone    Allergic vs infection try cromolyn if no better call eye doctor  PAF HTN stable        F/u 3-4 mo

## 2020-06-15 NOTE — PROGRESS NOTES
[address]  [phone]     Subjective:      Derek Currie is a 70 y.o. female is here for routine f/u. The patient denies chest pain/ shortness of breath, orthopnea, PND, LE edema, palpitations, syncope, or presyncope.        Patient Active Problem List    Diagnosis Date Noted    [facility]a ([facility] 03/09/2020    Hypercholesterolemia 12/23/2019    Severe obesity with body mass index (BMI) of 35.0 to 39.9 with serious comorbidity ([facility] 05/31/2018    Paroxysmal atrial fibrillation ([facility] 03/20/2017    Hypothyroidism 09/10/2014    HTN (hypertension) 09/10/2014    Generalized OA 09/05/2014      Sonali Méndez MD  Past Medical History:   Diagnosis Date    Arthritis     left knee    GERD (gastroesophageal reflux disease)     Hypercholesterolemia     Hypertension     Hypothyroid 2004      Past Surgical History:   Procedure Laterality Date    COLONOSCOPY N/A 6/6/2018    COLONOSCOPY [facility] Trinda Boxer, [facility] ENDOSCOPY    HX [address] HX OTHER SURGICAL Bilateral     has 2 implant removed along with a natural tooth beside it     HX TUBAL LIGATION  1969     Allergies   Allergen Reactions    Lisinopril Cough      Family History   Problem Relation Age of Onset    Heart Disease Father 79    Hypertension Father     Stroke Father     Psychiatric Disorder Sister     Cancer Brother     Psychiatric Disorder Brother       Social History     Socioeconomic History    Marital status:      Spouse name: Not on file    Number of children: Not on file    Years of education: Not on file    Highest education level: Not on file   Occupational History    Not on file   Social Needs    Financial resource strain: Not on file    Food insecurity     Worry: Not on file     Inability: Not on file    Transportation needs     Medical: Not on file     Non-medical: Not on file   Tobacco Use    Smoking status: Former Smoker     Types: Cigarettes    Smokeless tobacco: Never Used    Tobacco comment: smoked for about 2 yrs and quit over 50 yrs again   Substance and Sexual Activity    Alcohol use: Yes     Alcohol/week: 14.0 standard drinks     Types: 14 Glasses of wine per week     Comment: 4 maybe more    Drug use: No    Sexual activity: Yes     Partners: Male     Birth control/protection: None   Lifestyle    Physical activity     Days per week: Not on file     Minutes per session: Not on file    Stress: Not on file   Relationships    Social connections     Talks on phone: Not on file     Gets together: Not on file     Attends Synagogue service: Not on file     Active member of club or organization: Not on file     Attends meetings of clubs or organizations: Not on file     Relationship status: Not on file    Intimate partner violence     Fear of current or ex partner: Not on file     Emotionally abused: Not on file     Physically abused: Not on file     Forced sexual activity: Not on file   Other Topics Concern    Not on file   Social History Narrative    sailing      Current Outpatient Medications   Medication Sig    erythromycin (ILOTYCIN) ophthalmic ointment Administer  to both eyes every six (6) hours.  predniSONE (DELTASONE) 5 mg tablet TAKE 1 TABLET BY MOUTH DAILY  Indications: muscle pain and stiffness in the shoulder, neck and pelvis    apixaban (ELIQUIS) 5 mg tablet TAKE 1 TABLET BY MOUTH TWICE DAILY    metoprolol tartrate (LOPRESSOR) 25 mg tablet Take 0.5 Tabs by mouth two (2) times a day.  levothyroxine (SYNTHROID) 112 mcg tablet Take 1 Tab by mouth Daily (before breakfast).  metroNIDAZOLE (METROGEL) 1 % topical gel Apply  to affected area daily. Use a thin layer to affected areas after washing    Calcium Citrate-Vitamin D3 (CITRACAL + D PETITES) tablet Take 1 Tab by mouth two (2) times a day.  cromolyn (OPTICROM) 4 % ophthalmic solution Administer 1 Drop to both eyes four (4) times daily.  Use in affected eye(s)  cyclobenzaprine (FLEXERIL) 5 mg tablet Take 1 Tab by mouth three (3) times daily as needed for Muscle Spasm(s). No current facility-administered medications for this visit. Review of Symptoms:  11 systems reviewed, negative other than as stated in the HPI    Physical ExamPhysical Exam:    Vitals:    06/18/20 1133 06/18/20 1145   BP: 130/82 132/84   Pulse: (!) 57    Resp: 18    SpO2: 97%    Weight: 221 lb 6.4 oz (100.4 kg)    Height: 5' 3\" (1.6 m)      Body mass index is 39.22 kg/m². General PE  Gen:  NAD  Mental Status - Alert. General Appearance - Not in acute distress. HEENT:  PERRL, no carotid bruits or JVD  Chest and Lung Exam   Inspection: Accessory muscles - No use of accessory muscles in breathing. Auscultation:   Breath sounds: - Normal.   Cardiovascular   Inspection: Jugular vein - Bilateral - Inspection Normal.   Palpation/Percussion:   Apical Impulse: - Normal.   Auscultation: Rhythm - Regular. Heart Sounds - S1 WNL and S2 WNL. No S3 or S4. Murmurs & Other Heart Sounds: Auscultation of the heart reveals - No Murmurs. Peripheral Vascular   Upper Extremity: Inspection - Bilateral - No Cyanotic nailbeds or Digital clubbing. Lower Extremity:   Palpation: Edema - Bilateral - No edema. Abdomen:   Soft, non-tender, bowel sounds are active.   Neuro: A&O times 3, CN and motor grossly WNL    Labs:   Lab Results   Component Value Date/Time    Cholesterol, total 205 (H) 12/31/2019 01:53 PM    Cholesterol, total 238 (H) 06/12/2019 03:12 PM    Cholesterol, total 224 (H) 09/13/2016 12:12 PM    Cholesterol, total 173 07/17/2014 11:33 AM    HDL Cholesterol 52 12/31/2019 01:53 PM    HDL Cholesterol 51 06/12/2019 03:12 PM    HDL Cholesterol 51 09/13/2016 12:12 PM    HDL Cholesterol 49 07/17/2014 11:33 AM    LDL, calculated 112 (H) 12/31/2019 01:53 PM    LDL, calculated 121 (H) 06/12/2019 03:12 PM    LDL, calculated 120 (H) 09/13/2016 12:12 PM    LDL, calculated 84 07/17/2014 11:33 AM Triglyceride 207 (H) 12/31/2019 01:53 PM    Triglyceride 331 (H) 06/12/2019 03:12 PM    Triglyceride 264 (H) 09/13/2016 12:12 PM    Triglyceride 202 (H) 07/17/2014 11:33 AM     No results found for: CPK, CPKX, CPX  Lab Results   Component Value Date/Time    Sodium 139 02/24/2020 12:17 PM    Potassium 4.6 02/24/2020 12:17 PM    Chloride 100 02/24/2020 12:17 PM    CO2 27 02/24/2020 12:17 PM    Glucose 97 02/24/2020 12:17 PM    BUN 14 02/24/2020 12:17 PM    Creatinine 0.77 02/24/2020 12:17 PM    BUN/Creatinine ratio 18 02/24/2020 12:17 PM    GFR est AA 86 02/24/2020 12:17 PM    GFR est non-AA 75 02/24/2020 12:17 PM    Calcium 10.1 02/24/2020 12:17 PM    Bilirubin, total 0.5 02/24/2020 12:17 PM    Alk. phosphatase 84 02/24/2020 12:17 PM    Protein, total 6.8 02/24/2020 12:17 PM    Albumin 4.1 02/24/2020 12:17 PM    A-G Ratio 1.5 02/24/2020 12:17 PM    ALT (SGPT) 43 (H) 02/24/2020 12:17 PM       EKG:       Assessment:     Assessment:      1. Paroxysmal atrial fibrillation (HCC)    2. Irregular heart beat    3. Essential hypertension    4. Obesity (BMI 30-39.9)    5. Polymyalgia rheumatica ([address]        Orders Placed This Encounter    AMB POC EKG ROUTINE W/ 12 LEADS, INTER & REP     Order Specific Question:   Reason for Exam:     Answer:   routine    erythromycin (ILOTYCIN) ophthalmic ointment     Sig: Administer  to both eyes every six (6) hours. Plan:     Patient presents for follow up, doing well and stable from cardiac standpoint. Last seen in clinic 6/19: at that time she was Bradycardic on exam today so we decreased Lopressor 12.5 mg BID. PAF  Maintaining sinus rhythm  Echo in 3/2017 with preserved LVEF 50-55% with no valvular pathology and moderately dilated LA  Continue Eliquis for anti-coagulation  Continue BB    Stress test in 3/2017 negative for ischemia     HTN  BP controlled. Continue anti-hypertensive therapy and low sodium diet     Obesity (BMI 30-39. 9)  Discussed importance of diet and exercise - eventual goal of 30 - 60 minutes, 5-7 times a week as per AHA guideline. Goal of 10 % (22 lbs) weight loss for 6 months.       Polymyalgia Rheumatica  On steroid therapy       Continue current care and f/u in 1 year, sooner PRN.         Jose R Moe MD

## 2020-06-18 ENCOUNTER — OFFICE VISIT (OUTPATIENT)
Dept: CARDIOLOGY CLINIC | Age: 80
End: 2020-06-18

## 2020-06-18 VITALS
SYSTOLIC BLOOD PRESSURE: 132 MMHG | WEIGHT: 221.4 LBS | DIASTOLIC BLOOD PRESSURE: 84 MMHG | BODY MASS INDEX: 39.23 KG/M2 | RESPIRATION RATE: 18 BRPM | HEIGHT: 63 IN | HEART RATE: 57 BPM | OXYGEN SATURATION: 97 %

## 2020-06-18 DIAGNOSIS — E66.9 OBESITY (BMI 30-39.9): ICD-10-CM

## 2020-06-18 DIAGNOSIS — M35.3 POLYMYALGIA RHEUMATICA (HCC): ICD-10-CM

## 2020-06-18 DIAGNOSIS — I49.9 IRREGULAR HEART BEAT: ICD-10-CM

## 2020-06-18 DIAGNOSIS — I10 ESSENTIAL HYPERTENSION: ICD-10-CM

## 2020-06-18 DIAGNOSIS — I48.0 PAROXYSMAL ATRIAL FIBRILLATION (HCC): Primary | ICD-10-CM

## 2020-06-18 RX ORDER — ERYTHROMYCIN 5 MG/G
OINTMENT OPHTHALMIC EVERY 6 HOURS
COMMUNITY
Start: 2020-05-14 | End: 2020-06-25

## 2020-06-18 NOTE — PROGRESS NOTES
1. Have you been to the ER, urgent care clinic since your last visit? Hospitalized since your last visit? No.    2. Have you seen or consulted any other health care providers outside of the [address] [place] [address] since your last visit? Include any pap smears or colon screening.    No.      Chief Complaint   Patient presents with    Annual Exam     heart flutterings, swelling in feet, ankles

## 2020-06-25 ENCOUNTER — VIRTUAL VISIT (OUTPATIENT)
Dept: INTERNAL MEDICINE CLINIC | Age: 80
End: 2020-06-25

## 2020-06-25 ENCOUNTER — OFFICE VISIT (OUTPATIENT)
Dept: INTERNAL MEDICINE CLINIC | Age: 80
End: 2020-06-25

## 2020-06-25 DIAGNOSIS — I48.0 PAROXYSMAL ATRIAL FIBRILLATION (HCC): ICD-10-CM

## 2020-06-25 DIAGNOSIS — G44.89 OTHER HEADACHE SYNDROME: ICD-10-CM

## 2020-06-25 DIAGNOSIS — M35.3 POLYMYALGIA RHEUMATICA (HCC): Primary | ICD-10-CM

## 2020-06-25 NOTE — PROGRESS NOTES
Chief Complaint   Patient presents with    Headache    Jaw Pain    Cough     off and on / no congestion or fever     Fall Risk Assessment, last 12 mths 6/25/2020   Able to walk? Yes   Fall in past 12 months? Yes   Fall with injury? No   Number of falls in past 12 months 1   Fall Risk Score 1       3 most recent PHQ Screens 6/25/2020   Little interest or pleasure in doing things Not at all   Feeling down, depressed, irritable, or hopeless Not at all   Total Score PHQ 2 0       Abuse Screening Questionnaire 2/24/2020   Do you ever feel afraid of your partner? N   Are you in a relationship with someone who physically or mentally threatens you? N   Is it safe for you to go home?  Y       ADL Assessment 6/25/2020   Feeding yourself No Help Needed   Getting from bed to chair No Help Needed   Getting dressed No Help Needed   Bathing or showering No Help Needed   Walk across the room (includes cane/walker) No Help Needed   Using the telphone No Help Needed   Taking your medications No Help Needed   Preparing meals No Help Needed   Managing money (expenses/bills) No Help Needed   Moderately strenuous housework (laundry) No Help Needed   Shopping for personal items (toiletries/medicines) No Help Needed   Shopping for groceries No Help Needed   Driving No Help Needed   Climbing a flight of stairs No Help Needed   Getting to places beyond walking distances No Help Needed

## 2020-06-25 NOTE — LETTER
6/25/2020 3:20 PM 
 
[phone] RE:  REFERRAL TO VASCULAR SURGERY Dear Mr. Chan: 
 
Enclosed you will find the above named referral.  Please do not hesitate to contact this office if you have any questions. Sincerely, Thomasina Severs, NP

## 2020-06-25 NOTE — PROGRESS NOTES
Murali Wagoner is a 70 y.o. female who was seen by synchronous (real-time) audio-video technology on 6/25/2020. Consent: Murali Wagoner, who was seen by synchronous (real-time) audio-video technology, and/or her healthcare decision maker, is aware that this patient-initiated, Telehealth encounter on 6/25/2020 is a billable service, with coverage as determined by her insurance carrier. She is aware that she may receive a bill and has provided verbal consent to proceed: Yes. I spent at least 30 minutes on this visit with this established patient. Subjective:   Murali Wagoner is a 70 y.o. female who was seen for Headache; Jaw Pain; and Cough (off and on / no congestion or fever)    HISTORY OF PRESENT ILLNESS  Complained of headache 3 weeks ago that lasted for 4 days. Patient reported was at the right mid temple. Associated symptoms were otalgia and jaw pain. Patient noted it felt like she had TMJ and could not open her mouth or chew food. Recently had an elevated CRP- 16.13 on 3/2/20 and was diagnosed with polymyalgia rheumatica. Hx elevated liver enzymes. Was on Prednisolone and Pregabalin and had a reaction. Not sure which caused it? Discontinued Pregabalin and changed to Prednisone. Currently on 5mg. Went to the dentist and she got Amoxicillin which helped her jaw pain get better but stated it was \"still sore. \"  Pt noted 2 years ago lost part of her jawbone to an infection. Pt reported her eyes were \"red. \" Hx  Blepharitis. Went to the ophthalmologist 3 weeks ago and was prescribed Emycin gel. Pt noted her vision was distorted x 1 1/2 days, but pt reported she has \" bad vision\" anyways. Discussed PMR and possibility of temporal arteritis. Discussed that Prednisone is the treatment and a temporal artery biopsy can confirm the diagnosis. Discussed referral to vascular surgeon and pt in agreement. Headache   The history is provided by the patient. This is a recurrent problem.  The current episode started more than 1 week ago. The problem occurs every several days. The problem has not changed since onset. Associated symptoms include headaches. Pertinent negatives include no chest pain, no abdominal pain and no shortness of breath. The symptoms are aggravated by stress. The symptoms are relieved by acetaminophen. She has tried acetaminophen for the symptoms. The treatment provided mild relief. Hx Afib and on Apixaban. Prior to Admission medications    Medication Sig Start Date End Date Taking? Authorizing Provider   erythromycin (ILOTYCIN) ophthalmic ointment Administer  to both eyes every six (6) hours. 5/14/20  Yes Provider, Historical   predniSONE (DELTASONE) 5 mg tablet TAKE 1 TABLET BY MOUTH DAILY  Indications: muscle pain and stiffness in the shoulder, neck and pelvis 5/11/20  Yes Tiffany Juan MD   cromolyn (OPTICROM) 4 % ophthalmic solution Administer 1 Drop to both eyes four (4) times daily. Use in affected eye(s) 5/11/20  Yes Tiffany Juan MD   cyclobenzaprine (FLEXERIL) 5 mg tablet Take 1 Tab by mouth three (3) times daily as needed for Muscle Spasm(s). 2/24/20  Yes Tiffany Juan MD   apixaban (ELIQUIS) 5 mg tablet TAKE 1 TABLET BY MOUTH TWICE DAILY 1/27/20  Yes Deana Macdonald MD   metoprolol tartrate (LOPRESSOR) 25 mg tablet Take 0.5 Tabs by mouth two (2) times a day. 1/8/20  Yes Deana Macdonald MD   levothyroxine (SYNTHROID) 112 mcg tablet Take 1 Tab by mouth Daily (before breakfast). 1/7/20  Yes Tiffany Juan MD   metroNIDAZOLE (METROGEL) 1 % topical gel Apply  to affected area daily. Use a thin layer to affected areas after washing   Yes Provider, Historical   Calcium Citrate-Vitamin D3 (CITRACAL + D PETITES) tablet Take 1 Tab by mouth two (2) times a day.  3/22/18  Yes Tiffany Juan MD     Allergies   Allergen Reactions    Lisinopril Cough     Past Medical History:   Diagnosis Date    Arthritis     left knee    GERD (gastroesophageal reflux disease)     Hypercholesterolemia     Hypertension     Hypothyroid 2004     Past Surgical History:   Procedure Laterality Date    COLONOSCOPY N/A 6/6/2018    COLONOSCOPY performed by Tami [facility]quis [facility] at [facility] ENDOSCOPY    HX APPENDECTOMY  1960    HX OTHER SURGICAL Bilateral     has 2 implant removed along with a natural tooth beside it     HX TUBAL LIGATION  1969     Family History   Problem Relation Age of Onset    Heart Disease Father 79    Hypertension Father     Stroke Father     Psychiatric Disorder Sister     Cancer Brother     Psychiatric Disorder Brother      Social History     Tobacco Use    Smoking status: Former Smoker     Types: Cigarettes    Smokeless tobacco: Never Used    Tobacco comment: smoked for about 2 yrs and quit over [address] yrs again   Substance Use Topics    Alcohol use: Yes     Alcohol/week: 14.0 standard drinks     Types: 14 Glasses of wine per week     Comment: 4 maybe more     Review of Systems   Constitutional: Negative for chills and fever. HENT: Positive for congestion and ear pain (feels like something is \" stuck\" back in there- when she opens jaw). Eyes: Positive for blurred vision, pain and redness. Respiratory: Positive for cough. Negative for shortness of breath. Cardiovascular: Negative for chest pain. Gastrointestinal: Negative for abdominal pain, nausea and vomiting. Musculoskeletal: Negative for joint pain and myalgias. Neurological: Positive for dizziness and headaches. Negative for weakness. Objective:   Vital Signs: (As obtained by patient/caregiver at home)  There were no vitals taken for this visit.      Developed and well-nourished [x] No apparent distress      [] Abnormal -     Mental status: [x] Alert and awake  [x] Oriented to person/place/time [x] Able to follow commands    [] Abnormal -     Eyes:   EOM    []  Normal    [] Abnormal -   Sclera  [x]  Normal    [] Abnormal -          Discharge [x]  None visible   [] Abnormal -     HENT: [x] Normocephalic, atraumatic  [] Abnormal -   [x] Mouth/Throat: Mucous membranes are moist    External Ears [x] Normal  [] Abnormal -    Neck: [x] No visualized mass [] Abnormal -     Pulmonary/Chest: [x] Respiratory effort normal   [x] No visualized signs of difficulty breathing or respiratory distress        [] Abnormal -      Musculoskeletal:   [] Normal gait with no signs of ataxia         [x] Normal range of motion of neck        [] Abnormal -     Neurological:        [x] No Facial Asymmetry (Cranial nerve 7 motor function) (limited exam due to video visit)          [x] No gaze palsy        [] Abnormal -          Skin:        [x] No significant exanthematous lesions or discoloration noted on facial skin         [] Abnormal -            Psychiatric:       [x] Normal Affect [] Abnormal -        [x] No Hallucinations    Assessment & Plan:   The complexity of medical decision making for this visit is moderate     ICD-10-CM ICD-9-CM    1. Polymyalgia rheumatica (HCC) M35.3 725 REFERRAL TO VASCULAR SURGERY   2. Paroxysmal atrial fibrillation (HCC) I48.0 427.31    3. Other headache syndrome G44.89 339.89      1. Polymyalgia rheumatica (HCC)  Advised to make an apt with surgeon for evaluation for temporal arteritis and possible temporal artery biopsy.   - REFERRAL TO VASCULAR SURGERY    2. Paroxysmal atrial fibrillation (HCC)  Continue Apixaban. 3. Other headache syndrome  Tylenol Arthritis 650mg 2 tabs in am and pm.       Suggested pt make an apt with vascular surgeon for an evaluation of possible temporal arteritis. Needs apt within the next week. Advised to take Tylenol Arthritis 2 tabs Q12hrs PRN pain. If s/s worsen go to the ER. Follow up in 2 weeks. We discussed the expected course, resolution and complications of the diagnosis(es) in detail. Medication risks, benefits, costs, interactions, and alternatives were discussed as indicated.   I advised her to contact the office if her condition worsens, changes or fails to improve as anticipated. She expressed understanding with the diagnosis(es) and plan. Ezra Khalil is a 70 y.o. female who was evaluated by a video visit encounter for concerns as above. Patient identification was verified prior to start of the visit. A caregiver was present when appropriate. Due to this being a TeleHealth encounter (During [facility]-13 public health emergency), evaluation of the following organ systems was limited: Vitals/Constitutional/EENT/Resp/CV/GI//MS/Neuro/Skin/Heme-Lymph-Imm. Pursuant to the emergency declaration under the [address] [place] [address], [facility] waiver authority and the [employer] and [employer] Act, this Virtual  Visit was conducted, with patient's (and/or legal guardian's) consent, to reduce the patient's risk of exposure to COVID-19 and provide necessary medical care. Services were provided through a video synchronous discussion virtually to substitute for in-person clinic visit. Patient and provider were located at their individual homes. If symptoms worsen and necessary, call 911 or go to nearest ER.        Lonnie Darling NP

## 2020-06-29 ENCOUNTER — DOCUMENTATION ONLY (OUTPATIENT)
Dept: INTERNAL MEDICINE CLINIC | Age: 80
End: 2020-06-29

## 2020-06-29 ENCOUNTER — TELEPHONE (OUTPATIENT)
Dept: INTERNAL MEDICINE CLINIC | Age: 80
End: 2020-06-29

## 2020-06-29 NOTE — PROGRESS NOTES
Per order of patient and of Brennon Huston NP faxed copy of referral order and last office notes to Dr Maureen De La Fuente office at [phone] - confirmation of receipt received - they will contact patient for an appointment  Iesha Kwan LPN  3/08/5607  0:38 PM

## 2020-06-29 NOTE — TELEPHONE ENCOUNTER
Spoke with Dr. Yajaira Wahl. He stated that If Ms. Chan is on Prednisone for temporal arteritis then doing a temporal biopsy -99% of the time does not really show anything. Pt is on Apixaban for Afib and would have to stop that. May bring more difficulties than it is worth. He stated he would call and talk to the pt. Have pt continue prednisone for now.

## 2020-07-09 ENCOUNTER — VIRTUAL VISIT (OUTPATIENT)
Dept: INTERNAL MEDICINE CLINIC | Age: 80
End: 2020-07-09

## 2020-07-09 DIAGNOSIS — I48.0 PAROXYSMAL ATRIAL FIBRILLATION (HCC): ICD-10-CM

## 2020-07-09 DIAGNOSIS — I10 ESSENTIAL HYPERTENSION: ICD-10-CM

## 2020-07-09 DIAGNOSIS — M35.3 POLYMYALGIA RHEUMATICA (HCC): Primary | ICD-10-CM

## 2020-07-09 DIAGNOSIS — E03.9 HYPOTHYROIDISM, UNSPECIFIED TYPE: ICD-10-CM

## 2020-07-09 RX ORDER — LEVOTHYROXINE SODIUM 112 UG/1
112 TABLET ORAL
Qty: 90 TAB | Refills: 1 | Status: SHIPPED | OUTPATIENT
Start: 2020-07-09

## 2020-07-09 RX ORDER — PREDNISONE 5 MG/1
TABLET ORAL
Qty: 90 TAB | Refills: 1 | Status: SHIPPED | OUTPATIENT
Start: 2020-07-09

## 2020-07-09 RX ORDER — PREDNISONE 10 MG/1
TABLET ORAL
Qty: 20 TAB | Refills: 1 | Status: SHIPPED | OUTPATIENT
Start: 2020-07-09

## 2020-07-09 NOTE — PROGRESS NOTES
Chief Complaint   Patient presents with    Jaw Pain     jaw pain, pelvic pain 8/10     Health Maintenance reviewed. I have reviewed the patient's medical history in detail and updated the computerized patient record. 1. Have you been to the ER, urgent care clinic since your last visit? Hospitalized since your last visit?no    2. Have you seen or consulted any other health care providers outside of the [address] [place] [address] [place] since your last visit? Include any pap smears or colon screening. No    Encouraged pt to discuss pt's wishes with spouse/partner/family and bring them in the next appt to follow thru with the Advanced Directive    Fall Risk Assessment, last 12 mths 7/9/2020   Able to walk? Yes   Fall in past 12 months? No   Fall with injury? -   Number of falls in past 12 months -   Fall Risk Score -       3 most recent PHQ Screens 7/9/2020   Little interest or pleasure in doing things Several days   Feeling down, depressed, irritable, or hopeless Several days   Total Score PHQ 2 2       Abuse Screening Questionnaire 7/9/2020   Do you ever feel afraid of your partner? N   Are you in a relationship with someone who physically or mentally threatens you? N   Is it safe for you to go home?  Y       ADL Assessment 7/9/2020   Feeding yourself No Help Needed   Getting from bed to chair No Help Needed   Getting dressed No Help Needed   Bathing or showering No Help Needed   Walk across the room (includes cane/walker) No Help Needed   Using the telphone No Help Needed   Taking your medications No Help Needed   Preparing meals No Help Needed   Managing money (expenses/bills) No Help Needed   Moderately strenuous housework (laundry) No Help Needed   Shopping for personal items (toiletries/medicines) No Help Needed   Shopping for groceries No Help Needed   Driving No Help Needed   Climbing a flight of stairs No Help Needed   Getting to places beyond walking distances No Help Needed

## 2020-07-09 NOTE — PROGRESS NOTES
HISTORY OF PRESENT ILLNESS  Yasmine An is a 70 y.o. female. Jaw Pain   The history is provided by the patient. This is a recurrent problem. The current episode started more than 1 week ago (JUne 1). The problem occurs daily. The problem has been gradually improving. Associated symptoms comments: Also having pelvic pain. Treatments tried: saw dentist amox given. The treatment provided mild relief. Same PMR pain  Lab Results   Component Value Date/Time    TSH 4.660 (H) 02/24/2020 12:17 PM    T3 Uptake 32 07/17/2014 11:33 AM    T4, Total 10.0 07/17/2014 11:33 AM       Review of Systems   Constitutional: Negative for fever. Genitourinary: Negative for dysuria. No DC   Musculoskeletal: Positive for falls. 3 weeks ago   Will be leaving for [facility] soon moving to be closer to family. Patient Active Problem List   Diagnosis Code    Generalized OA M15.9    Hypothyroidism E03.9    HTN (hypertension) I10    Paroxysmal atrial fibrillation (HCC) I48.0    Severe obesity with body mass index (BMI) of 35.0 to 39.9 with serious comorbidity (HCC) E66.01    Hypercholesterolemia E78.00    Polymyalgia rheumatica (HCC) M35.3    Other headache syndrome G44.89     Social History     Socioeconomic History    Marital status:      Spouse name: Not on file    Number of children: Not on file    Years of education: Not on file    Highest education level: Not on file   Occupational History    Not on file   Social Needs    Financial resource strain: Not on file    Food insecurity     Worry: Not on file     Inability: Not on file    Transportation needs     Medical: Not on file     Non-medical: Not on file   Tobacco Use    Smoking status: Former Smoker     Types: Cigarettes    Smokeless tobacco: Never Used    Tobacco comment: smoked for about 2 yrs and quit over 52 yrs again   Substance and Sexual Activity    Alcohol use:  Yes     Alcohol/week: 14.0 standard drinks     Types: 14 Glasses of wine per week     Comment: 4 maybe more    Drug use: No    Sexual activity: Yes     Partners: Male     Birth control/protection: None   Lifestyle    Physical activity     Days per week: Not on file     Minutes per session: Not on file    Stress: Not on file   Relationships    Social connections     Talks on phone: Not on file     Gets together: Not on file     Attends [employer] service: Not on file     Active member of club or organization: Not on file     Attends meetings of clubs or organizations: Not on file     Relationship status: Not on file    Intimate partner violence     Fear of current or ex partner: Not on file     Emotionally abused: Not on file     Physically abused: Not on file     Forced sexual activity: Not on file   Other Topics Concern    Not on file   Social History Narrative    sailing       Physical Exam  NAD  ASSESSMENT and PLAN  Encounter Diagnoses   Name Primary?  Polymyalgia rheumatica (HCC) Yes    Paroxysmal atrial fibrillation (HCC)     Hypothyroidism, unspecified type     Essential hypertension      Orders Placed This Encounter    predniSONE (DELTASONE) 5 mg tablet    apixaban (Eliquis) 5 mg tablet    predniSONE (DELTASONE) 10 mg tablet    levothyroxine (SYNTHROID) 112 mcg tablet   Treat symptoms as if PMR if no better call. Discussed possible side affects, precautions, and drug interactions and possible benefits of the medication(s). Hypertension hypothyroidism stable  Discussed medications and follow-ups needed to make the major move.   We will see her as needed

## 2020-07-29 ENCOUNTER — TELEPHONE (OUTPATIENT)
Dept: INTERNAL MEDICINE CLINIC | Age: 80
End: 2020-07-29

## 2022-03-18 PROBLEM — E66.01 SEVERE OBESITY WITH BODY MASS INDEX (BMI) OF 35.0 TO 39.9 WITH SERIOUS COMORBIDITY (HCC): Status: ACTIVE | Noted: 2018-05-31

## 2022-03-18 PROBLEM — E78.00 HYPERCHOLESTEROLEMIA: Status: ACTIVE | Noted: 2019-12-23

## 2022-03-19 PROBLEM — I48.0 PAROXYSMAL ATRIAL FIBRILLATION (HCC): Status: ACTIVE | Noted: 2017-03-20

## 2022-03-19 PROBLEM — G44.89 OTHER HEADACHE SYNDROME: Status: ACTIVE | Noted: 2020-06-25

## 2022-03-19 PROBLEM — M35.3 POLYMYALGIA RHEUMATICA (HCC): Status: ACTIVE | Noted: 2020-03-09

## 2023-04-17 ENCOUNTER — APPOINTMENT (RX ONLY)
Dept: URBAN - METROPOLITAN AREA CLINIC 155 | Facility: CLINIC | Age: 83
Setting detail: DERMATOLOGY
End: 2023-04-17

## 2023-04-17 DIAGNOSIS — L23.9 ALLERGIC CONTACT DERMATITIS, UNSPECIFIED CAUSE: ICD-10-CM | Status: INADEQUATELY CONTROLLED

## 2023-04-17 DIAGNOSIS — L57.8 OTHER SKIN CHANGES DUE TO CHRONIC EXPOSURE TO NONIONIZING RADIATION: ICD-10-CM | Status: INADEQUATELY CONTROLLED

## 2023-04-17 DIAGNOSIS — L71.8 OTHER ROSACEA: ICD-10-CM | Status: INADEQUATELY CONTROLLED

## 2023-04-17 DIAGNOSIS — D18.0 HEMANGIOMA: ICD-10-CM | Status: STABLE

## 2023-04-17 DIAGNOSIS — L81.4 OTHER MELANIN HYPERPIGMENTATION: ICD-10-CM | Status: STABLE

## 2023-04-17 DIAGNOSIS — L82.1 OTHER SEBORRHEIC KERATOSIS: ICD-10-CM | Status: STABLE

## 2023-04-17 DIAGNOSIS — L72.8 OTHER FOLLICULAR CYSTS OF THE SKIN AND SUBCUTANEOUS TISSUE: ICD-10-CM | Status: STABLE

## 2023-04-17 PROBLEM — L30.9 DERMATITIS, UNSPECIFIED: Status: ACTIVE | Noted: 2023-04-17

## 2023-04-17 PROBLEM — D18.01 HEMANGIOMA OF SKIN AND SUBCUTANEOUS TISSUE: Status: ACTIVE | Noted: 2023-04-17

## 2023-04-17 PROCEDURE — ? PRESCRIPTION

## 2023-04-17 PROCEDURE — ? SUNSCREEN RECOMMENDATIONS

## 2023-04-17 PROCEDURE — 99204 OFFICE O/P NEW MOD 45 MIN: CPT

## 2023-04-17 PROCEDURE — ? COUNSELING

## 2023-04-17 RX ORDER — MOMETASONE FUROATE 1 MG/G
CREAM TOPICAL
Qty: 45 | Refills: 2 | Status: ERX | COMMUNITY
Start: 2023-04-17

## 2023-04-17 RX ORDER — PHARMACY COMPOUNDING ACCESSORY
EACH MISCELLANEOUS
Qty: 30 | Refills: 10 | Status: ERX | COMMUNITY
Start: 2023-04-17

## 2023-04-17 RX ORDER — DOXYCYCLINE 100 MG/1
CAPSULE ORAL BID
Qty: 60 | Refills: 5 | Status: ERX | COMMUNITY
Start: 2023-04-17

## 2023-04-17 RX ADMIN — MOMETASONE FUROATE: 1 CREAM TOPICAL at 00:00

## 2023-04-17 RX ADMIN — Medication: at 00:00

## 2023-04-17 RX ADMIN — DOXYCYCLINE: 100 CAPSULE ORAL at 00:00

## 2023-04-17 ASSESSMENT — LOCATION ZONE DERM
LOCATION ZONE: ARM
LOCATION ZONE: TRUNK
LOCATION ZONE: AXILLAE
LOCATION ZONE: FACE

## 2023-04-17 ASSESSMENT — LOCATION SIMPLE DESCRIPTION DERM
LOCATION SIMPLE: LEFT CHEEK
LOCATION SIMPLE: CHEST
LOCATION SIMPLE: UPPER BACK
LOCATION SIMPLE: LEFT FOREHEAD
LOCATION SIMPLE: LEFT UPPER BACK
LOCATION SIMPLE: LEFT FOREARM
LOCATION SIMPLE: LEFT AXILLARY VAULT
LOCATION SIMPLE: RIGHT FOREARM
LOCATION SIMPLE: RIGHT CHEEK
LOCATION SIMPLE: LOWER BACK

## 2023-04-17 ASSESSMENT — LOCATION DETAILED DESCRIPTION DERM
LOCATION DETAILED: LEFT MID-UPPER BACK
LOCATION DETAILED: RIGHT MEDIAL SUPERIOR CHEST
LOCATION DETAILED: UPPER STERNUM
LOCATION DETAILED: LEFT PROXIMAL DORSAL FOREARM
LOCATION DETAILED: LEFT AXILLARY VAULT
LOCATION DETAILED: LEFT INFERIOR MEDIAL MALAR CHEEK
LOCATION DETAILED: SUPERIOR THORACIC SPINE
LOCATION DETAILED: RIGHT PROXIMAL DORSAL FOREARM
LOCATION DETAILED: RIGHT CENTRAL MALAR CHEEK
LOCATION DETAILED: SUPERIOR LUMBAR SPINE
LOCATION DETAILED: LEFT INFERIOR MEDIAL FOREHEAD
LOCATION DETAILED: RIGHT DISTAL DORSAL FOREARM
LOCATION DETAILED: LEFT INFERIOR MEDIAL UPPER BACK

## 2023-05-24 RX ORDER — PREDNISONE 10 MG/1
TABLET ORAL
COMMUNITY
Start: 2020-07-09

## 2023-05-24 RX ORDER — LEVOTHYROXINE SODIUM 112 UG/1
112 TABLET ORAL
COMMUNITY
Start: 2020-07-09

## 2023-05-24 RX ORDER — METRONIDAZOLE 10 MG/G
GEL TOPICAL DAILY
COMMUNITY

## 2023-05-24 RX ORDER — PREDNISONE 5 MG/1
TABLET ORAL
COMMUNITY
Start: 2020-07-09

## 2023-12-21 ENCOUNTER — APPOINTMENT (RX ONLY)
Dept: URBAN - METROPOLITAN AREA CLINIC 155 | Facility: CLINIC | Age: 83
Setting detail: DERMATOLOGY
End: 2023-12-21

## 2023-12-21 DIAGNOSIS — L57.0 ACTINIC KERATOSIS: ICD-10-CM | Status: INADEQUATELY CONTROLLED

## 2023-12-21 DIAGNOSIS — I78.8 OTHER DISEASES OF CAPILLARIES: ICD-10-CM | Status: INADEQUATELY CONTROLLED

## 2023-12-21 DIAGNOSIS — L65.9 NONSCARRING HAIR LOSS, UNSPECIFIED: ICD-10-CM | Status: INADEQUATELY CONTROLLED

## 2023-12-21 DIAGNOSIS — L71.8 OTHER ROSACEA: ICD-10-CM | Status: WELL CONTROLLED

## 2023-12-21 DIAGNOSIS — L82.1 OTHER SEBORRHEIC KERATOSIS: ICD-10-CM | Status: STABLE

## 2023-12-21 PROCEDURE — ? COUNSELING

## 2023-12-21 PROCEDURE — 99214 OFFICE O/P EST MOD 30 MIN: CPT

## 2023-12-21 PROCEDURE — ? PRESCRIPTION

## 2023-12-21 PROCEDURE — ? RECOMMENDATIONS

## 2023-12-21 PROCEDURE — ? PRESCRIPTION MEDICATION MANAGEMENT

## 2023-12-21 PROCEDURE — ? MEDICATION COUNSELING

## 2023-12-21 PROCEDURE — ? DIAGNOSIS COMMENT

## 2023-12-21 RX ORDER — PHARMACY COMPOUNDING ACCESSORY
EACH MISCELLANEOUS
Qty: 30 | Refills: 1 | Status: ERX

## 2023-12-21 RX ORDER — OXYMETAZOLINE HYDROCHLORIDE 1 G/100G
CREAM TOPICAL
Qty: 30 | Refills: 5 | Status: ERX | COMMUNITY
Start: 2023-12-21

## 2023-12-21 RX ADMIN — OXYMETAZOLINE HYDROCHLORIDE: 1 CREAM TOPICAL at 00:00

## 2023-12-21 ASSESSMENT — LOCATION SIMPLE DESCRIPTION DERM
LOCATION SIMPLE: SCALP
LOCATION SIMPLE: RIGHT CHEEK
LOCATION SIMPLE: NOSE
LOCATION SIMPLE: RIGHT THIGH
LOCATION SIMPLE: LEFT CHEEK

## 2023-12-21 ASSESSMENT — LOCATION DETAILED DESCRIPTION DERM
LOCATION DETAILED: LEFT CENTRAL MALAR CHEEK
LOCATION DETAILED: LEFT SUPERIOR PARIETAL SCALP
LOCATION DETAILED: RIGHT CENTRAL MALAR CHEEK
LOCATION DETAILED: NASAL DORSUM
LOCATION DETAILED: LEFT INFERIOR MEDIAL MALAR CHEEK
LOCATION DETAILED: RIGHT ANTERIOR PROXIMAL THIGH
LOCATION DETAILED: LEFT MEDIAL MALAR CHEEK
LOCATION DETAILED: NASAL ROOT

## 2023-12-21 ASSESSMENT — LOCATION ZONE DERM
LOCATION ZONE: LEG
LOCATION ZONE: FACE
LOCATION ZONE: SCALP
LOCATION ZONE: NOSE

## 2023-12-21 NOTE — PROCEDURE: DIAGNOSIS COMMENT
Comment: Patient is going to consult with Lorin Hancock
Render Risk Assessment In Note?: no
Detail Level: Simple

## 2023-12-21 NOTE — PROCEDURE: RECOMMENDATIONS
Render Risk Assessment In Note?: no
Recommendations (Free Text): Nutrafol
Detail Level: Zone
Recommendation Preamble: The following recommendations were made during the visit:

## 2023-12-21 NOTE — PROCEDURE: PRESCRIPTION MEDICATION MANAGEMENT
Samples Given: Rhofade 1 % topical cream
Initiate Treatment: Rhofade 1 % topical cream Q am\\nQuantity: 30.0 g  Days Supply: 30\\nSig: Apply to face once daily in the morning.
Detail Level: Zone
Continue Regimen: pharmacy compounding accessory \\nQuantity: 30.0 g\\nSig: compound metronidazole 1%, ivermectin 1%,apply to affected area 1-2 x daily prn
Render In Strict Bullet Format?: No
Initiate Treatment: pharmacy compounding accessory \\nQuantity: 30.0 g  Days Supply: 30\\nSig: Fluorouracil/ Calcipotriene Cream 4.5/0.005% apply to affected areas on nose BID x 4 days. Do not apply to open wounds. Store in refrigerator.

## 2023-12-21 NOTE — PROCEDURE: MEDICATION COUNSELING
Azathioprine Counseling:  I discussed with the patient the risks of azathioprine including but not limited to myelosuppression, immunosuppression, hepatotoxicity, lymphoma, and infections.  The patient understands that monitoring is required including baseline LFTs, Creatinine, possible TPMP genotyping and weekly CBCs for the first month and then every 2 weeks thereafter.  The patient verbalized understanding of the proper use and possible adverse effects of azathioprine.  All of the patient's questions and concerns were addressed.
Opioid Counseling: I discussed with the patient the potential side effects of opioids including but not limited to addiction, altered mental status, and depression. I stressed avoiding alcohol, benzodiazepines, muscle relaxants and sleep aids unless specifically okayed by a physician. The patient verbalized understanding of the proper use and possible adverse effects of opioids. All of the patient's questions and concerns were addressed. They were instructed to flush the remaining pills down the toilet if they did not need them for pain.
Dapsone Pregnancy And Lactation Text: This medication is Pregnancy Category C and is not considered safe during pregnancy or breast feeding.
Fluconazole Pregnancy And Lactation Text: This medication is Pregnancy Category C and it isn't know if it is safe during pregnancy. It is also excreted in breast milk.
Propranolol Counseling:  I discussed with the patient the risks of propranolol including but not limited to low heart rate, low blood pressure, low blood sugar, restlessness and increased cold sensitivity. They should call the office if they experience any of these side effects.
Wartpeel Pregnancy And Lactation Text: This medication is Pregnancy Category X and contraindicated in pregnancy and in women who may become pregnant. It is unknown if this medication is excreted in breast milk.
Doxycycline Pregnancy And Lactation Text: This medication is Pregnancy Category D and not consider safe during pregnancy. It is also excreted in breast milk but is considered safe for shorter treatment courses.
Azelaic Acid Pregnancy And Lactation Text: This medication is considered safe during pregnancy and breast feeding.
Valtrex Pregnancy And Lactation Text: this medication is Pregnancy Category B and is considered safe during pregnancy. This medication is not directly found in breast milk but it's metabolite acyclovir is present.
Taltz Counseling: I discussed with the patient the risks of ixekizumab including but not limited to immunosuppression, serious infections, worsening of inflammatory bowel disease and drug reactions.  The patient understands that monitoring is required including a PPD at baseline and must alert us or the primary physician if symptoms of infection or other concerning signs are noted.
Rinvoq Counseling: I discussed with the patient the risks of Rinvoq therapy including but not limited to upper respiratory tract infections, shingles, cold sores, bronchitis, nausea, cough, fever, acne, and headache. Live vaccines should be avoided.  This medication has been linked to serious infections; higher rate of mortality; malignancy and lymphoproliferative disorders; major adverse cardiovascular events; thrombosis; thrombocytopenia, anemia, and neutropenia; lipid elevations; liver enzyme elevations; and gastrointestinal perforations.
Libtayo Pregnancy And Lactation Text: This medication is contraindicated in pregnancy and when breast feeding.
Methotrexate Pregnancy And Lactation Text: This medication is Pregnancy Category X and is known to cause fetal harm. This medication is excreted in breast milk.
Solaraze Counseling:  I discussed with the patient the risks of Solaraze including but not limited to erythema, scaling, itching, weeping, crusting, and pain.
Rifampin Counseling: I discussed with the patient the risks of rifampin including but not limited to liver damage, kidney damage, red-orange body fluids, nausea/vomiting and severe allergy.
Drysol Counseling:  I discussed with the patient the risks of drysol/aluminum chloride including but not limited to skin rash, itching, irritation, burning.
Niacinamide Counseling: I recommended taking niacin or niacinamide, also know as vitamin B3, twice daily. Recent evidence suggests that taking vitamin B3 (500 mg twice daily) can reduce the risk of actinic keratoses and non-melanoma skin cancers. Side effects of vitamin B3 include flushing and headache.
Bexarotene Pregnancy And Lactation Text: This medication is Pregnancy Category X and should not be given to women who are pregnant or may become pregnant. This medication should not be used if you are breast feeding.
Prednisone Counseling:  I discussed with the patient the risks of prolonged use of prednisone including but not limited to weight gain, insomnia, osteoporosis, mood changes, diabetes, susceptibility to infection, glaucoma and high blood pressure.  In cases where prednisone use is prolonged, patients should be monitored with blood pressure checks, serum glucose levels and an eye exam.  Additionally, the patient may need to be placed on GI prophylaxis, PCP prophylaxis, and calcium and vitamin D supplementation and/or a bisphosphonate.  The patient verbalized understanding of the proper use and the possible adverse effects of prednisone.  All of the patient's questions and concerns were addressed.
Taltz Pregnancy And Lactation Text: The risk during pregnancy and breastfeeding is uncertain with this medication.
Nsaids Pregnancy And Lactation Text: These medications are considered safe up to 30 weeks gestation. It is excreted in breast milk.
Niacinamide Pregnancy And Lactation Text: These medications are considered safe during pregnancy.
Enbrel Counseling:  I discussed with the patient the risks of etanercept including but not limited to myelosuppression, immunosuppression, autoimmune hepatitis, demyelinating diseases, lymphoma, and infections.  The patient understands that monitoring is required including a PPD at baseline and must alert us or the primary physician if symptoms of infection or other concerning signs are noted.
Rifampin Pregnancy And Lactation Text: This medication is Pregnancy Category C and it isn't know if it is safe during pregnancy. It is also excreted in breast milk and should not be used if you are breast feeding.
Use Enhanced Medication Counseling?: No
Rinvoq Pregnancy And Lactation Text: Based on animal studies, Rinvoq may cause embryo-fetal harm when administered to pregnant women.  The medication should not be used in pregnancy.  Breastfeeding is not recommended during treatment and for 6 days after the last dose.
Topical Ketoconazole Counseling: Patient counseled that this medication may cause skin irritation or allergic reactions.  In the event of skin irritation, the patient was advised to reduce the amount of the drug applied or use it less frequently.   The patient verbalized understanding of the proper use and possible adverse effects of ketoconazole.  All of the patient's questions and concerns were addressed.
Klisyri Counseling:  I discussed with the patient the risks of Klisyri including but not limited to erythema, scaling, itching, weeping, crusting, and pain.
Rituxan Pregnancy And Lactation Text: This medication is Pregnancy Category C and it isn't know if it is safe during pregnancy. It is unknown if this medication is excreted in breast milk but similar antibodies are known to be excreted.
Azithromycin Counseling:  I discussed with the patient the risks of azithromycin including but not limited to GI upset, allergic reaction, drug rash, diarrhea, and yeast infections.
Cimetidine Counseling:  I discussed with the patient the risks of Cimetidine including but not limited to gynecomastia, headache, diarrhea, nausea, drowsiness, arrhythmias, pancreatitis, skin rashes, psychosis, bone marrow suppression and kidney toxicity.
Opzelura Pregnancy And Lactation Text: There is insufficient data to evaluate drug-associated risk for major birth defects, miscarriage, or other adverse maternal or fetal outcomes.  There is a pregnancy registry that monitors pregnancy outcomes in pregnant persons exposed to the medication during pregnancy.  It is unknown if this medication is excreted in breast milk.  Do not breastfeed during treatment and for about 4 weeks after the last dose.
Ivermectin Pregnancy And Lactation Text: This medication is Pregnancy Category C and it isn't known if it is safe during pregnancy. It is also excreted in breast milk.
Picato Counseling:  I discussed with the patient the risks of Picato including but not limited to erythema, scaling, itching, weeping, crusting, and pain.
Propranolol Pregnancy And Lactation Text: This medication is Pregnancy Category C and it isn't known if it is safe during pregnancy. It is excreted in breast milk.
Gabapentin Counseling: I discussed with the patient the risks of gabapentin including but not limited to dizziness, somnolence, fatigue and ataxia.
Griseofulvin Counseling:  I discussed with the patient the risks of griseofulvin including but not limited to photosensitivity, cytopenia, liver damage, nausea/vomiting and severe allergy.  The patient understands that this medication is best absorbed when taken with a fatty meal (e.g., ice cream or french fries).
Cimetidine Pregnancy And Lactation Text: This medication is Pregnancy Category B and is considered safe during pregnancy. It is also excreted in breast milk and breast feeding isn't recommended.
Siliq Counseling:  I discussed with the patient the risks of Siliq including but not limited to new or worsening depression, suicidal thoughts and behavior, immunosuppression, malignancy, posterior leukoencephalopathy syndrome, and serious infections.  The patient understands that monitoring is required including a PPD at baseline and must alert us or the primary physician if symptoms of infection or other concerning signs are noted. There is also a special program designed to monitor depression which is required with Siliq.
Winlevi Counseling:  I discussed with the patient the risks of topical clascoterone including but not limited to erythema, scaling, itching, and stinging. Patient voiced their understanding.
Erythromycin Counseling:  I discussed with the patient the risks of erythromycin including but not limited to GI upset, allergic reaction, drug rash, diarrhea, increase in liver enzymes, and yeast infections.
Opioid Pregnancy And Lactation Text: These medications can lead to premature delivery and should be avoided during pregnancy. These medications are also present in breast milk in small amounts.
Odomzo Counseling- I discussed with the patient the risks of Odomzo including but not limited to nausea, vomiting, diarrhea, constipation, weight loss, changes in the sense of taste, decreased appetite, muscle spasms, and hair loss.  The patient verbalized understanding of the proper use and possible adverse effects of Odomzo.  All of the patient's questions and concerns were addressed.
Benzoyl Peroxide Counseling: Patient counseled that medicine may cause skin irritation and bleach clothing.  In the event of skin irritation, the patient was advised to reduce the amount of the drug applied or use it less frequently.   The patient verbalized understanding of the proper use and possible adverse effects of benzoyl peroxide.  All of the patient's questions and concerns were addressed.
Solaraze Pregnancy And Lactation Text: This medication is Pregnancy Category B and is considered safe. There is some data to suggest avoiding during the third trimester. It is unknown if this medication is excreted in breast milk.
Azathioprine Pregnancy And Lactation Text: This medication is Pregnancy Category D and isn't considered safe during pregnancy. It is unknown if this medication is excreted in breast milk.
Isotretinoin Counseling: Patient should get monthly blood tests, not donate blood, not drive at night if vision affected, not share medication, and not undergo elective surgery for 6 months after tx completed. Side effects reviewed, pt to contact office should one occur.
Soolantra Counseling: I discussed with the patients the risks of topial Soolantra. This is a medicine which decreases the number of mites and inflammation in the skin. You experience burning, stinging, eye irritation or allergic reactions.  Please call our office if you develop any problems from using this medication.
Odomzo Pregnancy And Lactation Text: This medication is Pregnancy Category X and is absolutely contraindicated during pregnancy. It is unknown if it is excreted in breast milk.
Isotretinoin Pregnancy And Lactation Text: This medication is Pregnancy Category X and is considered extremely dangerous during pregnancy. It is unknown if it is excreted in breast milk.
Sotyktu Counseling:  I discussed the most common side effects of Sotyktu including: common cold, sore throat, sinus infections, cold sores, canker sores, folliculitis, and acne.  I also discussed more serious side effects of Sotyktu including but not limited to: serious allergic reactions; increased risk for infections such as TB; cancers such as lymphomas; rhabdomyolysis and elevated CPK; and elevated triglycerides and liver enzymes. 
Tremfya Counseling: I discussed with the patient the risks of guselkumab including but not limited to immunosuppression, serious infections, and drug reactions.  The patient understands that monitoring is required including a PPD at baseline and must alert us or the primary physician if symptoms of infection or other concerning signs are noted.
Sarecycline Counseling: Patient advised regarding possible photosensitivity and discoloration of the teeth, skin, lips, tongue and gums.  Patient instructed to avoid sunlight, if possible.  When exposed to sunlight, patients should wear protective clothing, sunglasses, and sunscreen.  The patient was instructed to call the office immediately if the following severe adverse effects occur:  hearing changes, easy bruising/bleeding, severe headache, or vision changes.  The patient verbalized understanding of the proper use and possible adverse effects of sarecycline.  All of the patient's questions and concerns were addressed.
Elidel Counseling: Patient may experience a mild burning sensation during topical application. Elidel is not approved in children less than 2 years of age. There have been case reports of hematologic and skin malignancies in patients using topical calcineurin inhibitors although causality is questionable.
Azithromycin Pregnancy And Lactation Text: This medication is considered safe during pregnancy and is also secreted in breast milk.
Enbrel Pregnancy And Lactation Text: This medication is Pregnancy Category B and is considered safe during pregnancy. It is unknown if this medication is excreted in breast milk.
Topical Ketoconazole Pregnancy And Lactation Text: This medication is Pregnancy Category B and is considered safe during pregnancy. It is unknown if it is excreted in breast milk.
Prednisone Pregnancy And Lactation Text: This medication is Pregnancy Category C and it isn't know if it is safe during pregnancy. This medication is excreted in breast milk.
Olanzapine Counseling- I discussed with the patient the common side effects of olanzapine including but are not limited to: lack of energy, dry mouth, increased appetite, sleepiness, tremor, constipation, dizziness, changes in behavior, or restlessness.  Explained that teenagers are more likely to experience headaches, abdominal pain, pain in the arms or legs, tiredness, and sleepiness.  Serious side effects include but are not limited: increased risk of death in elderly patients who are confused, have memory loss, or dementia-related psychosis; hyperglycemia; increased cholesterol and triglycerides; and weight gain.
Bactrim Counseling:  I discussed with the patient the risks of sulfa antibiotics including but not limited to GI upset, allergic reaction, drug rash, diarrhea, dizziness, photosensitivity, and yeast infections.  Rarely, more serious reactions can occur including but not limited to aplastic anemia, agranulocytosis, methemoglobinemia, blood dyscrasias, liver or kidney failure, lung infiltrates or desquamative/blistering drug rashes.
Arava Counseling:  Patient counseled regarding adverse effects of Arava including but not limited to nausea, vomiting, abnormalities in liver function tests. Patients may develop mouth sores, rash, diarrhea, and abnormalities in blood counts. The patient understands that monitoring is required including LFTs and blood counts.  There is a rare possibility of scarring of the liver and lung problems that can occur when taking methotrexate. Persistent nausea, loss of appetite, pale stools, dark urine, cough, and shortness of breath should be reported immediately. Patient advised to discontinue Arava treatment and consult with a physician prior to attempting conception. The patient will have to undergo a treatment to eliminate Arava from the body prior to conception.
Olanzapine Pregnancy And Lactation Text: This medication is pregnancy category C.   There are no adequate and well controlled trials with olanzapine in pregnant females.  Olanzapine should be used during pregnancy only if the potential benefit justifies the potential risk to the fetus.   In a study in lactating healthy women, olanzapine was excreted in breast milk.  It is recommended that women taking olanzapine should not breast feed.
Topical Metronidazole Counseling: Metronidazole is a topical antibiotic medication. You may experience burning, stinging, redness, or allergic reactions.  Please call our office if you develop any problems from using this medication.
SSKI Counseling:  I discussed with the patient the risks of SSKI including but not limited to thyroid abnormalities, metallic taste, GI upset, fever, headache, acne, arthralgias, paraesthesias, lymphadenopathy, easy bleeding, arrhythmias, and allergic reaction.
Griseofulvin Pregnancy And Lactation Text: This medication is Pregnancy Category X and is known to cause serious birth defects. It is unknown if this medication is excreted in breast milk but breast feeding should be avoided.
Gabapentin Pregnancy And Lactation Text: This medication is Pregnancy Category C and isn't considered safe during pregnancy. It is excreted in breast milk.
Doxepin Counseling:  Patient advised that the medication is sedating and not to drive a car after taking this medication. Patient informed of potential adverse effects including but not limited to dry mouth, urinary retention, and blurry vision.  The patient verbalized understanding of the proper use and possible adverse effects of doxepin.  All of the patient's questions and concerns were addressed.
Benzoyl Peroxide Pregnancy And Lactation Text: This medication is Pregnancy Category C. It is unknown if benzoyl peroxide is excreted in breast milk.
Cellcept Counseling:  I discussed with the patient the risks of mycophenolate mofetil including but not limited to infection/immunosuppression, GI upset, hypokalemia, hypercholesterolemia, bone marrow suppression, lymphoproliferative disorders, malignancy, GI ulceration/bleed/perforation, colitis, interstitial lung disease, kidney failure, progressive multifocal leukoencephalopathy, and birth defects.  The patient understands that monitoring is required including a baseline creatinine and regular CBC testing. In addition, patient must alert us immediately if symptoms of infection or other concerning signs are noted.
Picato Pregnancy And Lactation Text: This medication is Pregnancy Category C. It is unknown if this medication is excreted in breast milk.
Erythromycin Pregnancy And Lactation Text: This medication is Pregnancy Category B and is considered safe during pregnancy. It is also excreted in breast milk.
Winlevi Pregnancy And Lactation Text: This medication is considered safe during pregnancy and breastfeeding.
Adbry Counseling: I discussed with the patient the risks of tralokinumab including but not limited to eye infection and irritation, cold sores, injection site reactions, worsening of asthma, allergic reactions and increased risk of parasitic infection.  Live vaccines should be avoided while taking tralokinumab. The patient understands that monitoring is required and they must alert us or the primary physician if symptoms of infection or other concerning signs are noted.
Finasteride Counseling:  I discussed with the patient the risks of use of finasteride including but not limited to decreased libido, decreased ejaculate volume, gynecomastia, and depression. Women should not handle medication.  All of the patient's questions and concerns were addressed.
High Dose Vitamin A Counseling: Side effects reviewed, pt to contact office should one occur.
Itraconazole Counseling:  I discussed with the patient the risks of itraconazole including but not limited to liver damage, nausea/vomiting, neuropathy, and severe allergy.  The patient understands that this medication is best absorbed when taken with acidic beverages such as non-diet cola or ginger ale.  The patient understands that monitoring is required including baseline LFTs and repeat LFTs at intervals.  The patient understands that they are to contact us or the primary physician if concerning signs are noted.
Simponi Counseling:  I discussed with the patient the risks of golimumab including but not limited to myelosuppression, immunosuppression, autoimmune hepatitis, demyelinating diseases, lymphoma, and serious infections.  The patient understands that monitoring is required including a PPD at baseline and must alert us or the primary physician if symptoms of infection or other concerning signs are noted.
Sski Pregnancy And Lactation Text: This medication is Pregnancy Category D and isn't considered safe during pregnancy. It is excreted in breast milk.
Metronidazole Counseling:  I discussed with the patient the risks of metronidazole including but not limited to seizures, nausea/vomiting, a metallic taste in the mouth, nausea/vomiting and severe allergy.
Adbry Pregnancy And Lactation Text: It is unknown if this medication will adversely affect pregnancy or breast feeding.
Carac Counseling:  I discussed with the patient the risks of Carac including but not limited to erythema, scaling, itching, weeping, crusting, and pain.
VTAMA Counseling: I discussed with the patient that VTAMA is not for use in the eyes, mouth or mouth. They should call the office if they develop any signs of allergic reactions to VTAMA. The patient verbalized understanding of the proper use and possible adverse effects of VTAMA.  All of the patient's questions and concerns were addressed.
Sarecycline Pregnancy And Lactation Text: This medication is Pregnancy Category D and not consider safe during pregnancy. It is also excreted in breast milk.
Soolantra Pregnancy And Lactation Text: This medication is Pregnancy Category C. This medication is considered safe during breast feeding.
Sotyktu Pregnancy And Lactation Text: There is insufficient data to evaluate whether or not Sotyktu is safe to use during pregnancy.   It is not known if Sotyktu passes into breast milk and whether or not it is safe to use when breastfeeding.  
Humira Counseling:  I discussed with the patient the risks of adalimumab including but not limited to myelosuppression, immunosuppression, autoimmune hepatitis, demyelinating diseases, lymphoma, and serious infections.  The patient understands that monitoring is required including a PPD at baseline and must alert us or the primary physician if symptoms of infection or other concerning signs are noted.
Klisyri Pregnancy And Lactation Text: It is unknown if this medication can harm a developing fetus or if it is excreted in breast milk.
Oral Minoxidil Counseling- I discussed with the patient the risks of oral minoxidil including but not limited to shortness of breath, swelling of the feet or ankles, dizziness, lightheadedness, unwanted hair growth and allergic reaction.  The patient verbalized understanding of the proper use and possible adverse effects of oral minoxidil.  All of the patient's questions and concerns were addressed.
Xolair Counseling:  Patient informed of potential adverse effects including but not limited to fever, muscle aches, rash and allergic reactions.  The patient verbalized understanding of the proper use and possible adverse effects of Xolair.  All of the patient's questions and concerns were addressed.
Eucrisa Counseling: Patient may experience a mild burning sensation during topical application. Eucrisa is not approved in children less than 3 months of age.
Cibinqo Counseling: I discussed with the patient the risks of Cibinqo therapy including but not limited to common cold, nausea, headache, cold sores, increased blood CPK levels, dizziness, UTIs, fatigue, acne, and vomitting. Live vaccines should be avoided.  This medication has been linked to serious infections; higher rate of mortality; malignancy and lymphoproliferative disorders; major adverse cardiovascular events; thrombosis; thrombocytopenia and lymphopenia; lipid elevations; and retinal detachment.
Topical Metronidazole Pregnancy And Lactation Text: This medication is Pregnancy Category B and considered safe during pregnancy.  It is also considered safe to use while breastfeeding.
Glycopyrrolate Counseling:  I discussed with the patient the risks of glycopyrrolate including but not limited to skin rash, drowsiness, dry mouth, difficulty urinating, and blurred vision.
Doxepin Pregnancy And Lactation Text: This medication is Pregnancy Category C and it isn't known if it is safe during pregnancy. It is also excreted in breast milk and breast feeding isn't recommended.
Protopic Counseling: Patient may experience a mild burning sensation during topical application. Protopic is not approved in children less than 2 years of age. There have been case reports of hematologic and skin malignancies in patients using topical calcineurin inhibitors although causality is questionable.
Bactrim Pregnancy And Lactation Text: This medication is Pregnancy Category D and is known to cause fetal risk.  It is also excreted in breast milk.
Dutasteride Counseling: Dustasteride Counseling:  I discussed with the patient the risks of use of dutasteride including but not limited to decreased libido, decreased ejaculate volume, and gynecomastia. Women who can become pregnant should not handle medication.  All of the patient's questions and concerns were addressed.
Cyclophosphamide Counseling:  I discussed with the patient the risks of cyclophosphamide including but not limited to hair loss, hormonal abnormalities, decreased fertility, abdominal pain, diarrhea, nausea and vomiting, bone marrow suppression and infection. The patient understands that monitoring is required while taking this medication.
Protopic Pregnancy And Lactation Text: This medication is Pregnancy Category C. It is unknown if this medication is excreted in breast milk when applied topically.
Glycopyrrolate Pregnancy And Lactation Text: This medication is Pregnancy Category B and is considered safe during pregnancy. It is unknown if it is excreted breast milk.
Thalidomide Counseling: I discussed with the patient the risks of thalidomide including but not limited to birth defects, anxiety, weakness, chest pain, dizziness, cough and severe allergy.
Dutasteride Pregnancy And Lactation Text: This medication is absolutely contraindicated in women, especially during pregnancy and breast feeding. Feminization of male fetuses is possible if taking while pregnant.
Cibinqo Pregnancy And Lactation Text: It is unknown if this medication will adversely affect pregnancy or breast feeding.  You should not take this medication if you are currently pregnant or planning a pregnancy or while breastfeeding.
Metronidazole Pregnancy And Lactation Text: This medication is Pregnancy Category B and considered safe during pregnancy.  It is also excreted in breast milk.
Vtama Pregnancy And Lactation Text: It is unknown if this medication can cause problems during pregnancy and breastfeeding.
Finasteride Pregnancy And Lactation Text: This medication is absolutely contraindicated during pregnancy. It is unknown if it is excreted in breast milk.
Xeljanz Counseling: I discussed with the patient the risks of Xeljanz therapy including increased risk of infection, liver issues, headache, diarrhea, or cold symptoms. Live vaccines should be avoided. They were instructed to call if they have any problems.
Cimzia Counseling:  I discussed with the patient the risks of Cimzia including but not limited to immunosuppression, allergic reactions and infections.  The patient understands that monitoring is required including a PPD at baseline and must alert us or the primary physician if symptoms of infection or other concerning signs are noted.
Tetracycline Counseling: Patient counseled regarding possible photosensitivity and increased risk for sunburn.  Patient instructed to avoid sunlight, if possible.  When exposed to sunlight, patients should wear protective clothing, sunglasses, and sunscreen.  The patient was instructed to call the office immediately if the following severe adverse effects occur:  hearing changes, easy bruising/bleeding, severe headache, or vision changes.  The patient verbalized understanding of the proper use and possible adverse effects of tetracycline.  All of the patient's questions and concerns were addressed. Patient understands to avoid pregnancy while on therapy due to potential birth defects.
Topical Retinoid counseling:  Patient advised to apply a pea-sized amount only at bedtime and wait 30 minutes after washing their face before applying.  If too drying, patient may add a non-comedogenic moisturizer. The patient verbalized understanding of the proper use and possible adverse effects of retinoids.  All of the patient's questions and concerns were addressed.
High Dose Vitamin A Pregnancy And Lactation Text: High dose vitamin A therapy is contraindicated during pregnancy and breast feeding.
Xolair Pregnancy And Lactation Text: This medication is Pregnancy Category B and is considered safe during pregnancy. This medication is excreted in breast milk.
Clofazimine Counseling:  I discussed with the patient the risks of clofazimine including but not limited to skin and eye pigmentation, liver damage, nausea/vomiting, gastrointestinal bleeding and allergy.
Xeltimz Pregnancy And Lactation Text: This medication is Pregnancy Category D and is not considered safe during pregnancy.  The risk during breast feeding is also uncertain.
Oral Minoxidil Pregnancy And Lactation Text: This medication should only be used when clearly needed if you are pregnant, attempting to become pregnant or breast feeding.
Ilumya Counseling: I discussed with the patient the risks of tildrakizumab including but not limited to immunosuppression, malignancy, posterior leukoencephalopathy syndrome, and serious infections.  The patient understands that monitoring is required including a PPD at baseline and must alert us or the primary physician if symptoms of infection or other concerning signs are noted.
Eucrisa Pregnancy And Lactation Text: This medication has not been assigned a Pregnancy Risk Category but animal studies failed to show danger with the topical medication. It is unknown if the medication is excreted in breast milk.
Minoxidil Counseling: Minoxidil is a topical medication which can increase blood flow where it is applied. It is uncertain how this medication increases hair growth. Side effects are uncommon and include stinging and allergic reactions.
Cephalexin Counseling: I counseled the patient regarding use of cephalexin as an antibiotic for prophylactic and/or therapeutic purposes. Cephalexin (commonly prescribed under brand name Keflex) is a cephalosporin antibiotic which is active against numerous classes of bacteria, including most skin bacteria. Side effects may include nausea, diarrhea, gastrointestinal upset, rash, hives, yeast infections, and in rare cases, hepatitis, kidney disease, seizures, fever, confusion, neurologic symptoms, and others. Patients with severe allergies to penicillin medications are cautioned that there is about a 10% incidence of cross-reactivity with cephalosporins. When possible, patients with penicillin allergies should use alternatives to cephalosporins for antibiotic therapy.
Hydroxyzine Counseling: Patient advised that the medication is sedating and not to drive a car after taking this medication.  Patient informed of potential adverse effects including but not limited to dry mouth, urinary retention, and blurry vision.  The patient verbalized understanding of the proper use and possible adverse effects of hydroxyzine.  All of the patient's questions and concerns were addressed.
Topical Steroids Counseling: I discussed with the patient that prolonged use of topical steroids can result in the increased appearance of superficial blood vessels (telangiectasias), lightening (hypopigmentation) and thinning of the skin (atrophy).  Patient understands to avoid using high potency steroids in skin folds, the groin or the face.  The patient verbalized understanding of the proper use and possible adverse effects of topical steroids.  All of the patient's questions and concerns were addressed.
Hydroxyzine Pregnancy And Lactation Text: This medication is not safe during pregnancy and should not be taken. It is also excreted in breast milk and breast feeding isn't recommended.
Calcipotriene Pregnancy And Lactation Text: The use of this medication during pregnancy or lactation is not recommended as there is insufficient data.
Litfulo Counseling: I discussed with the patient the risks of Litfulo therapy including but not limited to upper respiratory tract infections, shingles, cold sores, and nausea. Live vaccines should be avoided.  This medication has been linked to serious infections; higher rate of mortality; malignancy and lymphoproliferative disorders; major adverse cardiovascular events; thrombosis; gastrointestinal perforations; neutropenia; lymphopenia; anemia; liver enzyme elevations; and lipid elevations.
Hydroxychloroquine Counseling:  I discussed with the patient that a baseline ophthalmologic exam is needed at the start of therapy and every year thereafter while on therapy. A CBC may also be warranted for monitoring.  The side effects of this medication were discussed with the patient, including but not limited to agranulocytosis, aplastic anemia, seizures, rashes, retinopathy, and liver toxicity. Patient instructed to call the office should any adverse effect occur.  The patient verbalized understanding of the proper use and possible adverse effects of Plaquenil.  All the patient's questions and concerns were addressed.
Otezla Counseling: The side effects of Otezla were discussed with the patient, including but not limited to worsening or new depression, weight loss, diarrhea, nausea, upper respiratory tract infection, and headache. Patient instructed to call the office should any adverse effect occur.  The patient verbalized understanding of the proper use and possible adverse effects of Otezla.  All the patient's questions and concerns were addressed.
Ketoconazole Counseling:   Patient counseled regarding improving absorption with orange juice.  Adverse effects include but are not limited to breast enlargement, headache, diarrhea, nausea, upset stomach, liver function test abnormalities, taste disturbance, and stomach pain.  There is a rare possibility of liver failure that can occur when taking ketoconazole. The patient understands that monitoring of LFTs may be required, especially at baseline. The patient verbalized understanding of the proper use and possible adverse effects of ketoconazole.  All of the patient's questions and concerns were addressed.
Zoryve Counseling:  I discussed with the patient that Zoryve is not for use in the eyes, mouth or vagina. The most commonly reported side effects include diarrhea, headache, insomnia, application site pain, upper respiratory tract infections, and urinary tract infections.  All of the patient's questions and concerns were addressed.
Skyrizi Counseling: I discussed with the patient the risks of risankizumab-rzaa including but not limited to immunosuppression, and serious infections.  The patient understands that monitoring is required including a PPD at baseline and must alert us or the primary physician if symptoms of infection or other concerning signs are noted.
Minocycline Counseling: Patient advised regarding possible photosensitivity and discoloration of the teeth, skin, lips, tongue and gums.  Patient instructed to avoid sunlight, if possible.  When exposed to sunlight, patients should wear protective clothing, sunglasses, and sunscreen.  The patient was instructed to call the office immediately if the following severe adverse effects occur:  hearing changes, easy bruising/bleeding, severe headache, or vision changes.  The patient verbalized understanding of the proper use and possible adverse effects of minocycline.  All of the patient's questions and concerns were addressed.
Qbrexza Counseling:  I discussed with the patient the risks of Qbrexza including but not limited to headache, mydriasis, blurred vision, dry eyes, nasal dryness, dry mouth, dry throat, dry skin, urinary hesitation, and constipation.  Local skin reactions including erythema, burning, stinging, and itching can also occur.
Calcipotriene Counseling:  I discussed with the patient the risks of calcipotriene including but not limited to erythema, scaling, itching, and irritation.
Cyclophosphamide Pregnancy And Lactation Text: This medication is Pregnancy Category D and it isn't considered safe during pregnancy. This medication is excreted in breast milk.
Cimzia Pregnancy And Lactation Text: This medication crosses the placenta but can be considered safe in certain situations. Cimzia may be excreted in breast milk.
Birth Control Pills Counseling: Birth Control Pill Counseling: I discussed with the patient the potential side effects of OCPs including but not limited to increased risk of stroke, heart attack, thrombophlebitis, deep venous thrombosis, hepatic adenomas, breast changes, GI upset, headaches, and depression.  The patient verbalized understanding of the proper use and possible adverse effects of OCPs. All of the patient's questions and concerns were addressed.
Birth Control Pills Pregnancy And Lactation Text: This medication should be avoided if pregnant and for the first 30 days post-partum.
Tranexamic Acid Counseling:  Patient advised of the small risk of bleeding problems with tranexamic acid. They were also instructed to call if they developed any nausea, vomiting or diarrhea. All of the patient's questions and concerns were addressed.
Cosentyx Counseling:  I discussed with the patient the risks of Cosentyx including but not limited to worsening of Crohn's disease, immunosuppression, allergic reactions and infections.  The patient understands that monitoring is required including a PPD at baseline and must alert us or the primary physician if symptoms of infection or other concerning signs are noted.
Cantharidin Counseling:  I discussed with the patient the risks of Cantharidin including but not limited to pain, redness, burning, itching, and blistering.
Hydroquinone Counseling:  Patient advised that medication may result in skin irritation, lightening (hypopigmentation), dryness, and burning.  In the event of skin irritation, the patient was advised to reduce the amount of the drug applied or use it less frequently.  Rarely, spots that are treated with hydroquinone can become darker (pseudoochronosis).  Should this occur, patient instructed to stop medication and call the office. The patient verbalized understanding of the proper use and possible adverse effects of hydroquinone.  All of the patient's questions and concerns were addressed.
Cephalexin Pregnancy And Lactation Text: This medication is Pregnancy Category B and considered safe during pregnancy.  It is also excreted in breast milk but can be used safely for shorter doses.
Tazorac Counseling:  Patient advised that medication is irritating and drying.  Patient may need to apply sparingly and wash off after an hour before eventually leaving it on overnight.  The patient verbalized understanding of the proper use and possible adverse effects of tazorac.  All of the patient's questions and concerns were addressed.
Topical Steroids Applications Pregnancy And Lactation Text: Most topical steroids are considered safe to use during pregnancy and lactation.  Any topical steroid applied to the breast or nipple should be washed off before breastfeeding.
Detail Level: Simple
Clindamycin Counseling: I counseled the patient regarding use of clindamycin as an antibiotic for prophylactic and/or therapeutic purposes. Clindamycin is active against numerous classes of bacteria, including skin bacteria. Side effects may include nausea, diarrhea, gastrointestinal upset, rash, hives, yeast infections, and in rare cases, colitis.
Mirvaso Counseling: Mirvaso is a topical medication which can decrease superficial blood flow where applied. Side effects are uncommon and include stinging, redness and allergic reactions.
Colchicine Counseling:  Patient counseled regarding adverse effects including but not limited to stomach upset (nausea, vomiting, stomach pain, or diarrhea).  Patient instructed to limit alcohol consumption while taking this medication.  Colchicine may reduce blood counts especially with prolonged use.  The patient understands that monitoring of kidney function and blood counts may be required, especially at baseline. The patient verbalized understanding of the proper use and possible adverse effects of colchicine.  All of the patient's questions and concerns were addressed.
Otezla Pregnancy And Lactation Text: This medication is Pregnancy Category C and it isn't known if it is safe during pregnancy. It is unknown if it is excreted in breast milk.
Cantharidin Pregnancy And Lactation Text: This medication has not been proven safe during pregnancy. It is unknown if this medication is excreted in breast milk.
Topical Sulfur Applications Counseling: Topical Sulfur Counseling: Patient counseled that this medication may cause skin irritation or allergic reactions.  In the event of skin irritation, the patient was advised to reduce the amount of the drug applied or use it less frequently.   The patient verbalized understanding of the proper use and possible adverse effects of topical sulfur application.  All of the patient's questions and concerns were addressed.
Infliximab Counseling:  I discussed with the patient the risks of infliximab including but not limited to myelosuppression, immunosuppression, autoimmune hepatitis, demyelinating diseases, lymphoma, and serious infections.  The patient understands that monitoring is required including a PPD at baseline and must alert us or the primary physician if symptoms of infection or other concerning signs are noted.
Ketoconazole Pregnancy And Lactation Text: This medication is Pregnancy Category C and it isn't know if it is safe during pregnancy. It is also excreted in breast milk and breast feeding isn't recommended.
Hydroxychloroquine Pregnancy And Lactation Text: This medication has been shown to cause fetal harm but it isn't assigned a Pregnancy Risk Category. There are small amounts excreted in breast milk.
Litfulo Pregnancy And Lactation Text: Based on animal studies, Lifulo may cause embryo-fetal harm when administered to pregnant women.  The medication should not be used in pregnancy.  Breastfeeding is not recommended during treatment.
Cyclosporine Counseling:  I discussed with the patient the risks of cyclosporine including but not limited to hypertension, gingival hyperplasia,myelosuppression, immunosuppression, liver damage, kidney damage, neurotoxicity, lymphoma, and serious infections. The patient understands that monitoring is required including baseline blood pressure, CBC, CMP, lipid panel and uric acid, and then 1-2 times monthly CMP and blood pressure.
Erivedge Counseling- I discussed with the patient the risks of Erivedge including but not limited to nausea, vomiting, diarrhea, constipation, weight loss, changes in the sense of taste, decreased appetite, muscle spasms, and hair loss.  The patient verbalized understanding of the proper use and possible adverse effects of Erivedge.  All of the patient's questions and concerns were addressed.
Aklief counseling:  Patient advised to apply a pea-sized amount only at bedtime and wait 30 minutes after washing their face before applying.  If too drying, patient may add a non-comedogenic moisturizer.  The most commonly reported side effects including irritation, redness, scaling, dryness, stinging, burning, itching, and increased risk of sunburn.  The patient verbalized understanding of the proper use and possible adverse effects of retinoids.  All of the patient's questions and concerns were addressed.
Qbrexza Pregnancy And Lactation Text: There is no available data on Qbrexza use in pregnant women.  There is no available data on Qbrexza use in lactation.
Acitretin Counseling:  I discussed with the patient the risks of acitretin including but not limited to hair loss, dry lips/skin/eyes, liver damage, hyperlipidemia, depression/suicidal ideation, photosensitivity.  Serious rare side effects can include but are not limited to pancreatitis, pseudotumor cerebri, bony changes, clot formation/stroke/heart attack.  Patient understands that alcohol is contraindicated since it can result in liver toxicity and significantly prolong the elimination of the drug by many years.
5-Fu Counseling: 5-Fluorouracil Counseling:  I discussed with the patient the risks of 5-fluorouracil including but not limited to erythema, scaling, itching, weeping, crusting, and pain.
Low Dose Naltrexone Counseling- I discussed with the patient the potential risks and side effects of low dose naltrexone including but not limited to: more vivid dreams, headaches, nausea, vomiting, abdominal pain, fatigue, dizziness, and anxiety.
Terbinafine Counseling: Patient counseling regarding adverse effects of terbinafine including but not limited to headache, diarrhea, rash, upset stomach, liver function test abnormalities, itching, taste/smell disturbance, nausea, abdominal pain, and flatulence.  There is a rare possibility of liver failure that can occur when taking terbinafine.  The patient understands that a baseline LFT and kidney function test may be required. The patient verbalized understanding of the proper use and possible adverse effects of terbinafine.  All of the patient's questions and concerns were addressed.
Spironolactone Counseling: Patient advised regarding risks of diarrhea, abdominal pain, hyperkalemia, birth defects (for female patients), liver toxicity and renal toxicity. The patient may need blood work to monitor liver and kidney function and potassium levels while on therapy. The patient verbalized understanding of the proper use and possible adverse effects of spironolactone.  All of the patient's questions and concerns were addressed.
Stelara Counseling:  I discussed with the patient the risks of ustekinumab including but not limited to immunosuppression, malignancy, posterior leukoencephalopathy syndrome, and serious infections.  The patient understands that monitoring is required including a PPD at baseline and must alert us or the primary physician if symptoms of infection or other concerning signs are noted.
Acitretin Pregnancy And Lactation Text: This medication is Pregnancy Category X and should not be given to women who are pregnant or may become pregnant in the future. This medication is excreted in breast milk.
Olumiant Counseling: I discussed with the patient the risks of Olumiant therapy including but not limited to upper respiratory tract infections, shingles, cold sores, and nausea. Live vaccines should be avoided.  This medication has been linked to serious infections; higher rate of mortality; malignancy and lymphoproliferative disorders; major adverse cardiovascular events; thrombosis; gastrointestinal perforations; neutropenia; lymphopenia; anemia; liver enzyme elevations; and lipid elevations.
Quinolones Counseling:  I discussed with the patient the risks of fluoroquinolones including but not limited to GI upset, allergic reaction, drug rash, diarrhea, dizziness, photosensitivity, yeast infections, liver function test abnormalities, tendonitis/tendon rupture.
Zyclara Counseling:  I discussed with the patient the risks of imiquimod including but not limited to erythema, scaling, itching, weeping, crusting, and pain.  Patient understands that the inflammatory response to imiquimod is variable from person to person and was educated regarded proper titration schedule.  If flu-like symptoms develop, patient knows to discontinue the medication and contact us.
Tazorac Pregnancy And Lactation Text: This medication is not safe during pregnancy. It is unknown if this medication is excreted in breast milk.
Tranexamic Acid Pregnancy And Lactation Text: It is unknown if this medication is safe during pregnancy or breast feeding.
Albendazole Counseling:  I discussed with the patient the risks of albendazole including but not limited to cytopenia, kidney damage, nausea/vomiting and severe allergy.  The patient understands that this medication is being used in an off-label manner.
Mirvaso Pregnancy And Lactation Text: This medication has not been assigned a Pregnancy Risk Category. It is unknown if the medication is excreted in breast milk.
Imiquimod Counseling:  I discussed with the patient the risks of imiquimod including but not limited to erythema, scaling, itching, weeping, crusting, and pain.  Patient understands that the inflammatory response to imiquimod is variable from person to person and was educated regarded proper titration schedule.  If flu-like symptoms develop, patient knows to discontinue the medication and contact us.
Oxybutynin Counseling:  I discussed with the patient the risks of oxybutynin including but not limited to skin rash, drowsiness, dry mouth, difficulty urinating, and blurred vision.
Aklief Pregnancy And Lactation Text: It is unknown if this medication is safe to use during pregnancy.  It is unknown if this medication is excreted in breast milk.  Breastfeeding women should use the topical cream on the smallest area of the skin for the shortest time needed while breastfeeding.  Do not apply to nipple and areola.
Topical Sulfur Applications Pregnancy And Lactation Text: This medication is considered safe during pregnancy and breast feeding secondary to limited systemic absorption.
Clindamycin Pregnancy And Lactation Text: This medication can be used in pregnancy if certain situations. Clindamycin is also present in breast milk.
Rhofade Counseling: Rhofade is a topical medication which can decrease superficial blood flow where applied. Side effects are uncommon and include stinging, redness and allergic reactions.
Libtayo Counseling- I discussed with the patient the risks of Libtayo including but not limited to nausea, vomiting, diarrhea, and bone or muscle pain.  The patient verbalized understanding of the proper use and possible adverse effects of Libtayo.  All of the patient's questions and concerns were addressed.
Bexarotene Counseling:  I discussed with the patient the risks of bexarotene including but not limited to hair loss, dry lips/skin/eyes, liver abnormalities, hyperlipidemia, pancreatitis, depression/suicidal ideation, photosensitivity, drug rash/allergic reactions, hypothyroidism, anemia, leukopenia, infection, cataracts, and teratogenicity.  Patient understands that they will need regular blood tests to check lipid profile, liver function tests, white blood cell count, thyroid function tests and pregnancy test if applicable.
Rituxan Counseling:  I discussed with the patient the risks of Rituxan infusions. Side effects can include infusion reactions, severe drug rashes including mucocutaneous reactions, reactivation of latent hepatitis and other infections and rarely progressive multifocal leukoencephalopathy.  All of the patient's questions and concerns were addressed.
Valtrex Counseling: I discussed with the patient the risks of valacyclovir including but not limited to kidney damage, nausea, vomiting and severe allergy.  The patient understands that if the infection seems to be worsening or is not improving, they are to call.
Low Dose Naltrexone Pregnancy And Lactation Text: Naltrexone is pregnancy category C.  There have been no adequate and well-controlled studies in pregnant women.  It should be used in pregnancy only if the potential benefit justifies the potential risk to the fetus.   Limited data indicates that naltrexone is minimally excreted into breastmilk.
Spironolactone Pregnancy And Lactation Text: This medication can cause feminization of the male fetus and should be avoided during pregnancy. The active metabolite is also found in breast milk.
Topical Clindamycin Counseling: Patient counseled that this medication may cause skin irritation or allergic reactions.  In the event of skin irritation, the patient was advised to reduce the amount of the drug applied or use it less frequently.   The patient verbalized understanding of the proper use and possible adverse effects of clindamycin.  All of the patient's questions and concerns were addressed.
Olumiant Pregnancy And Lactation Text: Based on animal studies, Olumiant may cause embryo-fetal harm when administered to pregnant women.  The medication should not be used in pregnancy.  Breastfeeding is not recommended during treatment.
Dupixent Counseling: I discussed with the patient the risks of dupilumab including but not limited to eye inflammation and irritation, cold sores, injection site reactions, allergic reactions and increased risk of parasitic infection. The patient understands that monitoring is required and they must alert us or the primary physician if symptoms of infection or other concerning signs are noted.
Methotrexate Counseling:  Patient counseled regarding adverse effects of methotrexate including but not limited to nausea, vomiting, abnormalities in liver function tests. Patients may develop mouth sores, rash, diarrhea, and abnormalities in blood counts. The patient understands that monitoring is required including LFT's and blood counts.  There is a rare possibility of scarring of the liver and lung problems that can occur when taking methotrexate. Persistent nausea, loss of appetite, pale stools, dark urine, cough, and shortness of breath should be reported immediately. Patient advised to discontinue methotrexate treatment at least three months before attempting to become pregnant.  I discussed the need for folate supplements while taking methotrexate.  These supplements can decrease side effects during methotrexate treatment. The patient verbalized understanding of the proper use and possible adverse effects of methotrexate.  All of the patient's questions and concerns were addressed.
Ivermectin Counseling:  Patient instructed to take medication on an empty stomach with a full glass of water.  Patient informed of potential adverse effects including but not limited to nausea, diarrhea, dizziness, itching, and swelling of the extremities or lymph nodes.  The patient verbalized understanding of the proper use and possible adverse effects of ivermectin.  All of the patient's questions and concerns were addressed.
Nsaids Counseling: NSAID Counseling: I discussed with the patient that NSAIDs should be taken with food. Prolonged use of NSAIDs can result in the development of stomach ulcers.  Patient advised to stop taking NSAIDs if abdominal pain occurs.  The patient verbalized understanding of the proper use and possible adverse effects of NSAIDs.  All of the patient's questions and concerns were addressed.
Dupixent Pregnancy And Lactation Text: This medication likely crosses the placenta but the risk for the fetus is uncertain. This medication is excreted in breast milk.
Fluconazole Counseling:  Patient counseled regarding adverse effects of fluconazole including but not limited to headache, diarrhea, nausea, upset stomach, liver function test abnormalities, taste disturbance, and stomach pain.  There is a rare possibility of liver failure that can occur when taking fluconazole.  The patient understands that monitoring of LFTs and kidney function test may be required, especially at baseline. The patient verbalized understanding of the proper use and possible adverse effects of fluconazole.  All of the patient's questions and concerns were addressed.
Dapsone Counseling: I discussed with the patient the risks of dapsone including but not limited to hemolytic anemia, agranulocytosis, rashes, methemoglobinemia, kidney failure, peripheral neuropathy, headaches, GI upset, and liver toxicity.  Patients who start dapsone require monitoring including baseline LFTs and weekly CBCs for the first month, then every month thereafter.  The patient verbalized understanding of the proper use and possible adverse effects of dapsone.  All of the patient's questions and concerns were addressed.
Azelaic Acid Counseling: Patient counseled that medicine may cause skin irritation and to avoid applying near the eyes.  In the event of skin irritation, the patient was advised to reduce the amount of the drug applied or use it less frequently.   The patient verbalized understanding of the proper use and possible adverse effects of azelaic acid.  All of the patient's questions and concerns were addressed.
Opzelura Counseling:  I discussed with the patient the risks of Opzelura including but not limited to nasopharngitis, bronchitis, ear infection, eosinophila, hives, diarrhea, folliculitis, tonsillitis, and rhinorrhea.  Taken orally, this medication has been linked to serious infections; higher rate of mortality; malignancy and lymphoproliferative disorders; major adverse cardiovascular events; thrombosis; thrombocytopenia, anemia, and neutropenia; and lipid elevations.
Doxycycline Counseling:  Patient counseled regarding possible photosensitivity and increased risk for sunburn.  Patient instructed to avoid sunlight, if possible.  When exposed to sunlight, patients should wear protective clothing, sunglasses, and sunscreen.  The patient was instructed to call the office immediately if the following severe adverse effects occur:  hearing changes, easy bruising/bleeding, severe headache, or vision changes.  The patient verbalized understanding of the proper use and possible adverse effects of doxycycline.  All of the patient's questions and concerns were addressed.
Wartpeel Counseling:  I discussed with the patient the risks of Wartpeel including but not limited to erythema, scaling, itching, weeping, crusting, and pain.

## 2023-12-27 ENCOUNTER — APPOINTMENT (RX ONLY)
Dept: URBAN - METROPOLITAN AREA CLINIC 155 | Facility: CLINIC | Age: 83
Setting detail: DERMATOLOGY
End: 2023-12-27

## 2023-12-27 DIAGNOSIS — Z41.9 ENCOUNTER FOR PROCEDURE FOR PURPOSES OTHER THAN REMEDYING HEALTH STATE, UNSPECIFIED: ICD-10-CM

## 2023-12-27 PROCEDURE — ? COSMETIC CONSULTATION: BBL

## 2023-12-27 PROCEDURE — ? ADDITIONAL NOTES

## 2023-12-27 PROCEDURE — ? COSMETIC CONSULTATION: GENERAL

## 2023-12-27 NOTE — HPI: DISCOLORATION
How Severe Is Your Skin Discoloration?: mild
Additional History: Patient has sun spots and telangiectasia throughout face.\\n\\nAlso has rosacea over nose and chin. Currently using CeraVe Foaming Cleanser and La Roche Posay moisturizer.

## 2024-04-17 ENCOUNTER — APPOINTMENT (RX ONLY)
Dept: URBAN - METROPOLITAN AREA CLINIC 155 | Facility: CLINIC | Age: 84
Setting detail: DERMATOLOGY
End: 2024-04-17

## 2024-04-17 DIAGNOSIS — L57.0 ACTINIC KERATOSIS: ICD-10-CM

## 2024-04-17 DIAGNOSIS — L72.0 EPIDERMAL CYST: ICD-10-CM | Status: INADEQUATELY CONTROLLED

## 2024-04-17 DIAGNOSIS — L71.8 OTHER ROSACEA: ICD-10-CM

## 2024-04-17 PROCEDURE — 99214 OFFICE O/P EST MOD 30 MIN: CPT | Mod: 25

## 2024-04-17 PROCEDURE — ? LIQUID NITROGEN

## 2024-04-17 PROCEDURE — ? ACNE SURGERY

## 2024-04-17 PROCEDURE — ? COUNSELING

## 2024-04-17 PROCEDURE — ? PRESCRIPTION SAMPLES PROVIDED

## 2024-04-17 PROCEDURE — 10040 EXTRACTION: CPT

## 2024-04-17 PROCEDURE — ? ADDITIONAL NOTES

## 2024-04-17 PROCEDURE — ? PRESCRIPTION MEDICATION MANAGEMENT

## 2024-04-17 PROCEDURE — 17000 DESTRUCT PREMALG LESION: CPT

## 2024-04-17 ASSESSMENT — LOCATION SIMPLE DESCRIPTION DERM
LOCATION SIMPLE: RIGHT CHEEK
LOCATION SIMPLE: LEFT CHEEK
LOCATION SIMPLE: LEFT EYEBROW
LOCATION SIMPLE: RIGHT LIP
LOCATION SIMPLE: NOSE

## 2024-04-17 ASSESSMENT — LOCATION DETAILED DESCRIPTION DERM
LOCATION DETAILED: LEFT CENTRAL EYEBROW
LOCATION DETAILED: LEFT INFERIOR MEDIAL MALAR CHEEK
LOCATION DETAILED: RIGHT CENTRAL MALAR CHEEK
LOCATION DETAILED: LEFT LATERAL EYEBROW
LOCATION DETAILED: NASAL DORSUM
LOCATION DETAILED: RIGHT MEDIAL BUCCAL CHEEK
LOCATION DETAILED: RIGHT LOWER CUTANEOUS LIP

## 2024-04-17 ASSESSMENT — LOCATION ZONE DERM
LOCATION ZONE: LIP
LOCATION ZONE: FACE
LOCATION ZONE: NOSE

## 2024-04-17 NOTE — PROCEDURE: LIQUID NITROGEN
Number Of Freeze-Thaw Cycles: 3 freeze-thaw cycles
Post-Care Instructions: I reviewed with the patient in detail post-care instructions. Patient is to wear sunprotection, and avoid picking at any of the treated lesions. Pt may apply Vaseline to crusted or scabbing areas.
Duration Of Freeze Thaw-Cycle (Seconds): 5
Show Applicator Variable?: Yes
Render Note In Bullet Format When Appropriate: No
Detail Level: Detailed
Consent: The patient's consent was obtained including but not limited to risks of crusting, scabbing, blistering, scarring, darker or lighter pigmentary change, recurrence, incomplete removal and infection.

## 2024-04-17 NOTE — PROCEDURE: ADDITIONAL NOTES
Render Risk Assessment In Note?: no
Detail Level: Simple
Additional Notes: Pt used compound chemo cream on face in December 2023. 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

## 2024-04-17 NOTE — PROCEDURE: PRESCRIPTION MEDICATION MANAGEMENT
Detail Level: Zone
Continue Regimen: RX: Rhofade 1 % topical cream QAM - Apply to face once daily in the morning.\\nRX: Compound metronidazole 1%, ivermectin 1%,apply to affected area QHS.
Render In Strict Bullet Format?: No

## 2025-05-11 NOTE — PROCEDURE: ADDITIONAL NOTES
Detail Level: Zone
Render Risk Assessment In Note?: no
Additional Notes: Discussed going slow and steady with BBL treatments due to patient’s RA.  Dr. Kwan is ok treated RA patients on methotrexate with BBL.
independent

## (undated) DEVICE — KENDALL RADIOLUCENT FOAM MONITORING ELECTRODE RECTANGULAR SHAPE: Brand: KENDALL

## (undated) DEVICE — Device

## (undated) DEVICE — TOWEL 4 PLY TISS 19X30 SUE WHT

## (undated) DEVICE — TRAP SUC MUCOUS 70ML -- MEDICHOICE MEDLINE

## (undated) DEVICE — SYR 3ML LL TIP 1/10ML GRAD --

## (undated) DEVICE — BASIN EMSIS 16OZ GRAPHITE PLAS KID SHP MOLD GRAD FOR ORAL

## (undated) DEVICE — SET ADMIN 16ML TBNG L100IN 2 Y INJ SITE IV PIGGY BK DISP

## (undated) DEVICE — NEEDLE HYPO 18GA L1.5IN PNK S STL HUB POLYPR SHLD REG BVL

## (undated) DEVICE — SNARE ENDOSCP M L240CM W27MM SHTH DIA2.4MM CHN 2.8MM OVL

## (undated) DEVICE — Z DISCONTINUED PER MEDLINE LINE GAS SAMPLING O2/CO2 LNG AD 13 FT NSL W/ TBNG FILTERLINE

## (undated) DEVICE — SOLIDIFIER MEDC 1200ML -- CONVERT TO 356117

## (undated) DEVICE — CATH IV AUTOGRD BC BLU 22GA 25 -- INSYTE

## (undated) DEVICE — CONTAINER SPEC 20 ML LID NEUT BUFF FORMALIN 10 % POLYPR STS

## (undated) DEVICE — STRAINER URIN CALC RNL MSH -- CONVERT TO ITEM 357634

## (undated) DEVICE — SYR 10ML LUER LOK 1/5ML GRAD --

## (undated) DEVICE — 1200 GUARD II KIT W/5MM TUBE W/O VAC TUBE: Brand: GUARDIAN